# Patient Record
Sex: FEMALE | Race: WHITE | NOT HISPANIC OR LATINO | Employment: OTHER | URBAN - NONMETROPOLITAN AREA
[De-identification: names, ages, dates, MRNs, and addresses within clinical notes are randomized per-mention and may not be internally consistent; named-entity substitution may affect disease eponyms.]

---

## 2018-04-01 ENCOUNTER — HISTORICAL (OUTPATIENT)
Dept: ADMINISTRATIVE | Facility: HOSPITAL | Age: 58
End: 2018-04-01

## 2018-04-02 ENCOUNTER — HISTORICAL (OUTPATIENT)
Dept: ADMINISTRATIVE | Facility: HOSPITAL | Age: 58
End: 2018-04-02

## 2018-04-03 ENCOUNTER — HISTORICAL (OUTPATIENT)
Dept: ADMINISTRATIVE | Facility: HOSPITAL | Age: 58
End: 2018-04-03

## 2018-06-26 ENCOUNTER — HISTORICAL (OUTPATIENT)
Dept: ADMINISTRATIVE | Facility: HOSPITAL | Age: 58
End: 2018-06-26

## 2018-08-01 ENCOUNTER — HISTORICAL (OUTPATIENT)
Dept: ADMINISTRATIVE | Facility: HOSPITAL | Age: 58
End: 2018-08-01

## 2018-12-26 ENCOUNTER — HISTORICAL (OUTPATIENT)
Dept: ADMINISTRATIVE | Facility: HOSPITAL | Age: 58
End: 2018-12-26

## 2019-08-14 ENCOUNTER — HISTORICAL (OUTPATIENT)
Dept: ADMINISTRATIVE | Facility: HOSPITAL | Age: 59
End: 2019-08-14

## 2019-09-04 ENCOUNTER — HISTORICAL (OUTPATIENT)
Dept: ADMINISTRATIVE | Facility: HOSPITAL | Age: 59
End: 2019-09-04

## 2021-02-11 LAB
CHOLEST SERPL-MCNC: 191 MG/DL (ref 0–200)
DEPRECATED CALCIDIOL+CALCIFEROL SERPL-MC: 26.3 NG/ML (ref 30–100)
HDLC SERPL-MCNC: 98 MG/DL (ref 40–60)
LDLC SERPL CALC-MCNC: 63.3 MG/DL (ref 30–100)
TRIGL SERPL-MCNC: 83 MG/DL (ref 30–200)
TSH SERPL-ACNC: 2.48 UIU/ML (ref 0.36–3.74)

## 2021-07-11 ENCOUNTER — HISTORICAL (OUTPATIENT)
Dept: ADMINISTRATIVE | Facility: HOSPITAL | Age: 61
End: 2021-07-11

## 2021-07-29 LAB
FLUAV AG UPPER RESP QL IA.RAPID: NORMAL
FLUBV AG UPPER RESP QL IA.RAPID: NORMAL

## 2021-08-19 LAB
ALBUMIN SERPL-MCNC: 3.7 G/DL (ref 3.4–5)
ALBUMIN/GLOB SERPL: 1.3 {RATIO}
ALP SERPL-CCNC: 110 U/L (ref 50–144)
ALT SERPL-CCNC: 16 U/L (ref 1–45)
ANION GAP SERPL CALC-SCNC: 4 MMOL/L (ref 7–16)
AST SERPL-CCNC: 31 U/L (ref 14–36)
BASOPHILS # BLD AUTO: 0.03 X10(3)/MCL (ref 0.01–0.08)
BASOPHILS NFR BLD AUTO: 0.7 % (ref 0.1–1.2)
BILIRUB SERPL-MCNC: 0.64 MG/DL (ref 0.1–1)
BUN SERPL-MCNC: 12 MG/DL (ref 7–20)
CALCIUM SERPL-MCNC: 9.7 MG/DL (ref 8.4–10.2)
CHLORIDE SERPL-SCNC: 110 MMOL/L (ref 94–110)
CHOLEST SERPL-MCNC: 200 MG/DL (ref 0–200)
CO2 SERPL-SCNC: 25 MMOL/L (ref 21–32)
CREAT SERPL-MCNC: 0.7 MG/DL (ref 0.52–1.04)
CREAT/UREA NIT SERPL: 17.1 (ref 12–20)
DEPRECATED CALCIDIOL+CALCIFEROL SERPL-MC: 28.8 NG/ML (ref 30–100)
EOSINOPHIL # BLD AUTO: 0.2 X10(3)/MCL (ref 0.04–0.36)
EOSINOPHIL NFR BLD AUTO: 4.7 % (ref 0.7–7)
ERYTHROCYTE [DISTWIDTH] IN BLOOD BY AUTOMATED COUNT: 14.3 % (ref 11–14.5)
EST. AVERAGE GLUCOSE BLD GHB EST-MCNC: 93 MG/DL (ref 70–115)
GLOBULIN SER-MCNC: 2.8 G/DL (ref 2–3.9)
GLUCOSE SERPL-MCNC: 96 MGM./DL (ref 70–115)
HBA1C MFR BLD: 5.1 % (ref 4–6)
HCT VFR BLD AUTO: 39.5 % (ref 36–48)
HDLC SERPL-MCNC: 98 MG/DL (ref 40–60)
HGB BLD-MCNC: 12.6 G/DL (ref 11.8–16)
IMM GRANULOCYTES # BLD AUTO: 0.03 X10E3/UL (ref 0–0.03)
IMM GRANULOCYTES NFR BLD AUTO: 0.7 % (ref 0–0.5)
LDLC SERPL CALC-MCNC: 73.8 MG/DL (ref 30–100)
LYMPHOCYTES # BLD AUTO: 1.62 X10(3)/MCL (ref 1.16–3.74)
LYMPHOCYTES NFR BLD AUTO: 38.4 % (ref 20–55)
MCH RBC QN AUTO: 29.3 PG (ref 27–34)
MCHC RBC AUTO-ENTMCNC: 31.9 G/DL (ref 31–37)
MCV RBC AUTO: 91.9 FL (ref 79–99)
MONOCYTES # BLD AUTO: 0.31 X10(3)/MCL (ref 0.24–0.36)
MONOCYTES NFR BLD AUTO: 7.3 % (ref 4.7–12.5)
NEUTROPHILS # BLD AUTO: 2.03 X10(3)/MCL (ref 1.56–6.13)
NEUTROPHILS NFR BLD AUTO: 48.2 % (ref 37–73)
PLATELET # BLD AUTO: 165 X10(3)/MCL (ref 140–371)
PMV BLD AUTO: 10.8 FL (ref 9.4–12.4)
POTASSIUM SERPL-SCNC: 3.9 MMOL/L (ref 3.5–5.1)
PROT SERPL-MCNC: 6.5 G/DL (ref 6.3–8.2)
RBC # BLD AUTO: 4.3 X10(6)/MCL (ref 4–5.1)
SODIUM SERPL-SCNC: 139 MMOL/L (ref 135–145)
TRIGL SERPL-MCNC: 97 MG/DL (ref 30–200)
TSH SERPL-ACNC: 2.84 UIU/ML (ref 0.36–3.74)
WBC # SPEC AUTO: 4.2 X10(3)/MCL (ref 4–11.5)

## 2021-09-05 ENCOUNTER — HISTORICAL (OUTPATIENT)
Dept: ADMINISTRATIVE | Facility: HOSPITAL | Age: 61
End: 2021-09-05

## 2021-09-05 LAB
ALBUMIN SERPL-MCNC: 4.1 G/DL (ref 3.4–5)
ALBUMIN/GLOB SERPL: 1.3 {RATIO}
ALP SERPL-CCNC: 118 U/L (ref 50–144)
ALT SERPL-CCNC: 21 U/L (ref 1–45)
ANION GAP SERPL CALC-SCNC: 8 MMOL/L (ref 7–16)
AST SERPL-CCNC: 34 U/L (ref 14–36)
BASOPHILS # BLD AUTO: 0.03 X10(3)/MCL (ref 0.01–0.08)
BASOPHILS NFR BLD AUTO: 0.4 % (ref 0.1–1.2)
BILIRUB SERPL-MCNC: 0.96 MG/DL (ref 0.1–1)
BUN SERPL-MCNC: 12 MG/DL (ref 7–20)
CALCIUM SERPL-MCNC: 9.8 MG/DL (ref 8.4–10.2)
CHLORIDE SERPL-SCNC: 106 MMOL/L (ref 94–110)
CO2 SERPL-SCNC: 21 MMOL/L (ref 21–32)
CREAT SERPL-MCNC: 1 MG/DL (ref 0.52–1.04)
CREAT/UREA NIT SERPL: 12 (ref 12–20)
EOSINOPHIL # BLD AUTO: 0.08 X10(3)/MCL (ref 0.04–0.36)
EOSINOPHIL NFR BLD AUTO: 1.1 % (ref 0.7–7)
ERYTHROCYTE [DISTWIDTH] IN BLOOD BY AUTOMATED COUNT: 13.9 % (ref 11–14.5)
GLOBULIN SER-MCNC: 3.1 G/DL (ref 2–3.9)
GLUCOSE SERPL-MCNC: 113 MGM./DL (ref 70–115)
HCT VFR BLD AUTO: 39.8 % (ref 36–48)
HGB BLD-MCNC: 13.2 G/DL (ref 11.8–16)
IMM GRANULOCYTES # BLD AUTO: 0.03 X10E3/UL (ref 0–0.03)
IMM GRANULOCYTES NFR BLD AUTO: 0.4 % (ref 0–0.5)
LYMPHOCYTES # BLD AUTO: 1.12 X10(3)/MCL (ref 1.16–3.74)
LYMPHOCYTES NFR BLD AUTO: 15.5 % (ref 20–55)
MCH RBC QN AUTO: 29.5 PG (ref 27–34)
MCHC RBC AUTO-ENTMCNC: 33.2 G/DL (ref 31–37)
MCV RBC AUTO: 89 FL (ref 79–99)
MONOCYTES # BLD AUTO: 0.73 X10(3)/MCL (ref 0.24–0.36)
MONOCYTES NFR BLD AUTO: 10.1 % (ref 4.7–12.5)
NEUTROPHILS # BLD AUTO: 5.23 X10(3)/MCL (ref 1.56–6.13)
NEUTROPHILS NFR BLD AUTO: 72.5 % (ref 37–73)
PLATELET # BLD AUTO: 111 X10(3)/MCL (ref 140–371)
PMV BLD AUTO: 10.2 FL (ref 9.4–12.4)
POTASSIUM SERPL-SCNC: 3.1 MMOL/L (ref 3.5–5.1)
PROT SERPL-MCNC: 7.2 G/DL (ref 6.3–8.2)
RBC # BLD AUTO: 4.47 X10(6)/MCL (ref 4–5.1)
SODIUM SERPL-SCNC: 135 MMOL/L (ref 135–145)
WBC # SPEC AUTO: 7.2 X10(3)/MCL (ref 4–11.5)

## 2022-03-29 ENCOUNTER — HISTORICAL (OUTPATIENT)
Dept: ADMINISTRATIVE | Facility: HOSPITAL | Age: 62
End: 2022-03-29

## 2022-04-10 ENCOUNTER — HISTORICAL (OUTPATIENT)
Dept: ADMINISTRATIVE | Facility: HOSPITAL | Age: 62
End: 2022-04-10

## 2022-04-26 VITALS
WEIGHT: 203.06 LBS | SYSTOLIC BLOOD PRESSURE: 138 MMHG | HEIGHT: 61 IN | BODY MASS INDEX: 38.34 KG/M2 | OXYGEN SATURATION: 97 % | DIASTOLIC BLOOD PRESSURE: 70 MMHG

## 2022-05-02 ENCOUNTER — HISTORICAL (OUTPATIENT)
Dept: ADMINISTRATIVE | Facility: HOSPITAL | Age: 62
End: 2022-05-02

## 2022-11-29 ENCOUNTER — HISTORICAL (OUTPATIENT)
Dept: ADMINISTRATIVE | Facility: HOSPITAL | Age: 62
End: 2022-11-29

## 2023-01-12 ENCOUNTER — OFFICE VISIT (OUTPATIENT)
Dept: HEMATOLOGY/ONCOLOGY | Facility: CLINIC | Age: 63
End: 2023-01-12
Payer: MEDICARE

## 2023-01-12 VITALS
OXYGEN SATURATION: 97 % | TEMPERATURE: 98 F | RESPIRATION RATE: 16 BRPM | BODY MASS INDEX: 41.54 KG/M2 | HEART RATE: 56 BPM | DIASTOLIC BLOOD PRESSURE: 74 MMHG | SYSTOLIC BLOOD PRESSURE: 134 MMHG | HEIGHT: 61 IN | WEIGHT: 220 LBS

## 2023-01-12 DIAGNOSIS — D70.9 NEUTROPENIA, UNSPECIFIED TYPE: Primary | ICD-10-CM

## 2023-01-12 DIAGNOSIS — D70.8 OTHER NEUTROPENIA: ICD-10-CM

## 2023-01-12 DIAGNOSIS — F31.9 BIPOLAR AFFECTIVE DISORDER, REMISSION STATUS UNSPECIFIED: ICD-10-CM

## 2023-01-12 DIAGNOSIS — E66.01 MORBID OBESITY: ICD-10-CM

## 2023-01-12 PROCEDURE — 3075F SYST BP GE 130 - 139MM HG: CPT | Mod: CPTII,S$GLB,, | Performed by: INTERNAL MEDICINE

## 2023-01-12 PROCEDURE — 3075F PR MOST RECENT SYSTOLIC BLOOD PRESS GE 130-139MM HG: ICD-10-PCS | Mod: CPTII,S$GLB,, | Performed by: INTERNAL MEDICINE

## 2023-01-12 PROCEDURE — 3008F BODY MASS INDEX DOCD: CPT | Mod: CPTII,S$GLB,, | Performed by: INTERNAL MEDICINE

## 2023-01-12 PROCEDURE — 99205 OFFICE O/P NEW HI 60 MIN: CPT | Mod: S$GLB,,, | Performed by: INTERNAL MEDICINE

## 2023-01-12 PROCEDURE — 1159F PR MEDICATION LIST DOCUMENTED IN MEDICAL RECORD: ICD-10-PCS | Mod: CPTII,S$GLB,, | Performed by: INTERNAL MEDICINE

## 2023-01-12 PROCEDURE — 99205 PR OFFICE/OUTPT VISIT, NEW, LEVL V, 60-74 MIN: ICD-10-PCS | Mod: S$GLB,,, | Performed by: INTERNAL MEDICINE

## 2023-01-12 PROCEDURE — 3078F DIAST BP <80 MM HG: CPT | Mod: CPTII,S$GLB,, | Performed by: INTERNAL MEDICINE

## 2023-01-12 PROCEDURE — 3078F PR MOST RECENT DIASTOLIC BLOOD PRESSURE < 80 MM HG: ICD-10-PCS | Mod: CPTII,S$GLB,, | Performed by: INTERNAL MEDICINE

## 2023-01-12 PROCEDURE — 1159F MED LIST DOCD IN RCRD: CPT | Mod: CPTII,S$GLB,, | Performed by: INTERNAL MEDICINE

## 2023-01-12 PROCEDURE — 3008F PR BODY MASS INDEX (BMI) DOCUMENTED: ICD-10-PCS | Mod: CPTII,S$GLB,, | Performed by: INTERNAL MEDICINE

## 2023-01-12 RX ORDER — ROPINIROLE 0.5 MG/1
0.5 TABLET, FILM COATED ORAL NIGHTLY
COMMUNITY
Start: 2023-01-05 | End: 2023-02-07 | Stop reason: SDUPTHER

## 2023-01-12 RX ORDER — RIZATRIPTAN BENZOATE 10 MG/1
TABLET ORAL
COMMUNITY
Start: 2018-01-07 | End: 2023-08-08

## 2023-01-12 RX ORDER — PAROXETINE 30 MG/1
30 TABLET, FILM COATED ORAL
COMMUNITY
Start: 2023-01-09 | End: 2023-02-07 | Stop reason: SDUPTHER

## 2023-01-12 RX ORDER — ATORVASTATIN CALCIUM 40 MG/1
40 TABLET, FILM COATED ORAL
COMMUNITY
Start: 2022-10-05 | End: 2023-11-09 | Stop reason: SDUPTHER

## 2023-01-12 RX ORDER — NAPROXEN 500 MG/1
TABLET ORAL
COMMUNITY
Start: 2022-08-30

## 2023-01-12 RX ORDER — FLUTICASONE PROPIONATE 50 MCG
SPRAY, SUSPENSION (ML) NASAL
COMMUNITY
Start: 2013-01-07 | End: 2023-09-15 | Stop reason: SDUPTHER

## 2023-01-12 RX ORDER — CARBAMAZEPINE 200 MG/1
TABLET ORAL
COMMUNITY
Start: 2006-08-07 | End: 2023-03-24

## 2023-01-12 RX ORDER — TRAZODONE HYDROCHLORIDE 150 MG/1
TABLET ORAL
COMMUNITY
Start: 2022-12-27 | End: 2023-01-25 | Stop reason: SDUPTHER

## 2023-01-12 RX ORDER — ALBUTEROL SULFATE 90 UG/1
AEROSOL, METERED RESPIRATORY (INHALATION)
COMMUNITY
Start: 2022-08-30 | End: 2023-05-23 | Stop reason: SDUPTHER

## 2023-01-12 RX ORDER — TOPIRAMATE 25 MG/1
25 TABLET ORAL
COMMUNITY
Start: 2022-07-18 | End: 2023-05-23 | Stop reason: SDUPTHER

## 2023-01-12 RX ORDER — ERGOCALCIFEROL 1.25 MG/1
CAPSULE ORAL
COMMUNITY
Start: 2022-06-07 | End: 2023-05-23 | Stop reason: SDUPTHER

## 2023-01-12 NOTE — PROGRESS NOTES
HEMATOLOGY INITIAL CONSULTATION NOTE    Patient ID: Sophy Iyer is a 62 y.o. female.    Chief Complaint:  Leukopenia    HPI:  Patient is 62-year-old female with past medical history of migraines, hyperlipidemia, bipolar disorder, anxiety, restless leg syndrome, chronic sinusitis, gastroesophageal reflux disease, obesity, irritable bowel syndrome who is referred by her PCP for evaluation of leukopenia which per PCP is trending down over the last 1 year.  She has been on carbamazepine with a trial to wean her off.  She denies any weight loss, night sweats and presents to our clinic today for further evaluation.                  Past Medical History:   Diagnosis Date    Anemia     Depression     Mixed hyperlipidemia        No family history on file.    Social History     Socioeconomic History    Marital status: Single   Tobacco Use    Smoking status: Never    Smokeless tobacco: Never   Substance and Sexual Activity    Alcohol use: Never    Drug use: Never         Past Surgical History:   Procedure Laterality Date    CARPAL TUNNEL RELEASE      CHOLECYSTECTOMY      COLON BIOPSY      HYSTERECTOMY               Review of systems:  Review of Systems   Constitutional:  Positive for activity change and fatigue. Negative for appetite change, chills, diaphoresis and unexpected weight change.   HENT:  Negative for congestion, facial swelling, hearing loss, mouth sores, trouble swallowing and voice change.    Eyes:  Negative for photophobia, pain, discharge and itching.   Respiratory:  Negative for apnea, cough, choking, chest tightness and shortness of breath.    Cardiovascular:  Negative for chest pain, palpitations and leg swelling.   Gastrointestinal:  Negative for abdominal distention, abdominal pain, anal bleeding and blood in stool.   Endocrine: Negative for cold intolerance, heat intolerance, polydipsia and polyphagia.   Genitourinary:  Negative for difficulty urinating, dysuria, flank pain and hematuria.    Musculoskeletal:  Negative for arthralgias, back pain, joint swelling, myalgias, neck pain and neck stiffness.   Skin:  Negative for color change, pallor and wound.   Allergic/Immunologic: Negative for environmental allergies, food allergies and immunocompromised state.   Neurological:  Negative for dizziness, seizures, facial asymmetry, speech difficulty, light-headedness, numbness and headaches.   Hematological:  Negative for adenopathy. Does not bruise/bleed easily.   Psychiatric/Behavioral:  Negative for agitation, behavioral problems, confusion, decreased concentration and sleep disturbance.                                      Physical Exam  Vitals and nursing note reviewed.   Constitutional:       General: She is not in acute distress.     Appearance: Normal appearance. She is not ill-appearing.   HENT:      Head: Normocephalic and atraumatic.      Nose: No congestion or rhinorrhea.   Eyes:      General: No scleral icterus.     Extraocular Movements: Extraocular movements intact.      Pupils: Pupils are equal, round, and reactive to light.   Cardiovascular:      Rate and Rhythm: Normal rate and regular rhythm.      Pulses: Normal pulses.      Heart sounds: Normal heart sounds. No murmur heard.    No gallop.   Pulmonary:      Effort: Pulmonary effort is normal. No respiratory distress.      Breath sounds: Normal breath sounds. No stridor. No wheezing or rhonchi.   Abdominal:      General: Bowel sounds are normal. There is no distension.      Palpations: There is no mass.      Tenderness: There is no abdominal tenderness. There is no guarding.   Musculoskeletal:         General: No swelling, tenderness, deformity or signs of injury. Normal range of motion.      Cervical back: Normal range of motion and neck supple. No rigidity. No muscular tenderness.      Right lower leg: No edema.      Left lower leg: No edema.   Skin:     General: Skin is warm.      Coloration: Skin is not jaundiced or pale.      Findings:  No bruising or lesion.   Neurological:      General: No focal deficit present.      Mental Status: She is alert and oriented to person, place, and time.      Cranial Nerves: No cranial nerve deficit.      Sensory: No sensory deficit.      Motor: No weakness.      Gait: Gait normal.   Psychiatric:         Mood and Affect: Mood normal.         Behavior: Behavior normal.         Thought Content: Thought content normal.     Vitals:    01/12/23 1030   BP: 134/74   Pulse: (!) 56   Resp: 16   Temp: 97.8 °F (36.6 °C)   Body surface area is 2.07 meters squared.    Assessment/Plan:      Leukopenia:    == I do not have previous labs over the last 1 year for comparison but based on primary care provider notes her leukopenia has been worsening over the last 1 year.  == Recent labs done few months back reviewed, where she was noted to have mild leukopenia.  Normal differential.  Mildly decreased platelet count.  Normal hemoglobin hematocrit.  She was also noted to have mildly increased TSH which could be causing mild leukopenia.  == Peripheral blood smear revealed leukopenia with an absolute neutropenia along with mild thrombocytopenia but no other significant abnormality.  I discussed with her several etiologies of leukopenia including both primary versus secondary bone marrow etiologies.  == I believe in her case carbamazepine could be likely contributing, also she has mildly increased TSH levels.  Nonetheless for the sake of completion I will obtain flow cytometry and if any abnormalities noted on this will obtain a bone marrow biopsy.  If no abnormalities noted on flow then I would continue with observation only.    Plan:  Flow cytometry  TSH    Return to clinic in 5 weeks for MD visit with CBC prior    Pt is instructed to RTC with labs for continued monitoring of treatment as instructed.     Total time spent in counseling and discussion about further management options including relevant lab work, treatment,  prognosis,  medications and intended side effects was more than 60 minutes. More than 50 % of the time was spent in counseling and coordination of care.  This includes face to face time and non-face to face time preparing to see the patient (eg, review of tests), Obtaining and/or reviewing separately obtained history, Documenting clinical information in the electronic or other health record, Independently interpreting resultsand communicating results to the patient/family/caregiver, or Care coordination.

## 2023-01-13 PROBLEM — F31.9 BIPOLAR AFFECTIVE DISORDER, REMISSION STATUS UNSPECIFIED: Status: ACTIVE | Noted: 2023-01-13

## 2023-01-13 PROBLEM — D70.8 OTHER NEUTROPENIA: Status: ACTIVE | Noted: 2023-01-13

## 2023-01-13 PROBLEM — E66.01 MORBID OBESITY: Status: ACTIVE | Noted: 2023-01-13

## 2023-01-13 NOTE — PROGRESS NOTES
Patient, Sophy Iyer (MRN #[id number]), presented with a recent Platelet count less than 150 K/uL consistent with the definition of thrombocytopenia (ICD10 - D69.6).    Platelet   Date Value Ref Range Status   09/05/2021 111 (L) 140 - 371 x10(3)/mcL Final     The patient's thrombocytopenia was monitored, evaluated, addressed and/or treated. This addendum to the medical record is made on 01/13/2023.

## 2023-01-16 LAB — SPECIMEN TO PATHOLOGY: NORMAL

## 2023-01-25 DIAGNOSIS — G47.00 INSOMNIA, UNSPECIFIED TYPE: Primary | ICD-10-CM

## 2023-01-25 RX ORDER — TRAZODONE HYDROCHLORIDE 150 MG/1
TABLET ORAL
Qty: 30 TABLET | Refills: 1 | Status: SHIPPED | OUTPATIENT
Start: 2023-01-25 | End: 2023-02-16 | Stop reason: SDUPTHER

## 2023-02-07 DIAGNOSIS — G25.81 RESTLESS LEG: Primary | ICD-10-CM

## 2023-02-07 DIAGNOSIS — F32.9 MAJOR DEPRESSIVE DISORDER WITH CURRENT ACTIVE EPISODE, UNSPECIFIED DEPRESSION EPISODE SEVERITY, UNSPECIFIED WHETHER RECURRENT: ICD-10-CM

## 2023-02-07 DIAGNOSIS — D70.9 NEUTROPENIA, UNSPECIFIED TYPE: Primary | ICD-10-CM

## 2023-02-07 RX ORDER — ROPINIROLE 0.5 MG/1
0.5 TABLET, FILM COATED ORAL NIGHTLY
Qty: 30 TABLET | Refills: 0 | Status: SHIPPED | OUTPATIENT
Start: 2023-02-07 | End: 2023-02-16 | Stop reason: SDUPTHER

## 2023-02-07 RX ORDER — PAROXETINE 30 MG/1
30 TABLET, FILM COATED ORAL DAILY
Qty: 30 TABLET | Refills: 0 | Status: SHIPPED | OUTPATIENT
Start: 2023-02-07 | End: 2023-02-16 | Stop reason: SDUPTHER

## 2023-02-07 NOTE — TELEPHONE ENCOUNTER
Pt telephoned office to check on refill. Pt in need of refill by 4pm today for delivery.     Appt was rescheduled for next week when transportation is available.     Pt out of Paxil today.

## 2023-02-07 NOTE — TELEPHONE ENCOUNTER
----- Message from Renetta Justin sent at 2/6/2023 10:44 AM CST -----    Paxil 30 mg oral tablet    ropinirole 0.5 mg oral tablet    Completely out of both     [facility]

## 2023-02-09 ENCOUNTER — OFFICE VISIT (OUTPATIENT)
Dept: HEMATOLOGY/ONCOLOGY | Facility: CLINIC | Age: 63
End: 2023-02-09
Payer: MEDICARE

## 2023-02-09 VITALS
TEMPERATURE: 98 F | DIASTOLIC BLOOD PRESSURE: 72 MMHG | BODY MASS INDEX: 40.02 KG/M2 | HEART RATE: 63 BPM | HEIGHT: 61 IN | WEIGHT: 212 LBS | SYSTOLIC BLOOD PRESSURE: 119 MMHG | OXYGEN SATURATION: 97 % | RESPIRATION RATE: 16 BRPM

## 2023-02-09 DIAGNOSIS — D70.9 NEUTROPENIA, UNSPECIFIED TYPE: Primary | ICD-10-CM

## 2023-02-09 LAB
CELLS COUNTED: 100
EOSINOPHIL NFR BLD: 6 % (ref 1–3)
ERYTHROCYTE [DISTWIDTH] IN BLOOD BY AUTOMATED COUNT: 13.1 % (ref 12.5–18)
HCT VFR BLD AUTO: 39.8 % (ref 37–47)
HGB BLD-MCNC: 13.2 G/DL (ref 12–16)
LYMPHOCYTES NFR BLD: 24 % (ref 25–40)
MCH RBC QN AUTO: 31.7 PG (ref 27–31.2)
MCHC RBC AUTO-ENTMCNC: 33.2 G/DL (ref 31.8–35.4)
MCV RBC AUTO: 95.4 FL (ref 80–97)
MONOCYTES NFR BLD: 9 % (ref 1–15)
NEUTROPHILS # BLD AUTO: 1.8 10*3/UL (ref 1.8–7.7)
NEUTROPHILS NFR BLD: 61 % (ref 37–80)
NUCLEATED RED BLOOD CELLS: 0 %
PLATELETS: 101 10*3/UL (ref 142–424)
RBC # BLD AUTO: 4.17 10*6/UL (ref 4.2–5.4)
SMALL PLATELETS BLD QL SMEAR: ABNORMAL
WBC # BLD: 2.9 10*3/UL (ref 4.6–10.2)

## 2023-02-09 PROCEDURE — 3078F DIAST BP <80 MM HG: CPT | Mod: CPTII,S$GLB,, | Performed by: INTERNAL MEDICINE

## 2023-02-09 PROCEDURE — 3074F PR MOST RECENT SYSTOLIC BLOOD PRESSURE < 130 MM HG: ICD-10-PCS | Mod: CPTII,S$GLB,, | Performed by: INTERNAL MEDICINE

## 2023-02-09 PROCEDURE — 1159F MED LIST DOCD IN RCRD: CPT | Mod: CPTII,S$GLB,, | Performed by: INTERNAL MEDICINE

## 2023-02-09 PROCEDURE — 3074F SYST BP LT 130 MM HG: CPT | Mod: CPTII,S$GLB,, | Performed by: INTERNAL MEDICINE

## 2023-02-09 PROCEDURE — 3008F BODY MASS INDEX DOCD: CPT | Mod: CPTII,S$GLB,, | Performed by: INTERNAL MEDICINE

## 2023-02-09 PROCEDURE — 3078F PR MOST RECENT DIASTOLIC BLOOD PRESSURE < 80 MM HG: ICD-10-PCS | Mod: CPTII,S$GLB,, | Performed by: INTERNAL MEDICINE

## 2023-02-09 PROCEDURE — 99214 PR OFFICE/OUTPT VISIT, EST, LEVL IV, 30-39 MIN: ICD-10-PCS | Mod: S$GLB,,, | Performed by: INTERNAL MEDICINE

## 2023-02-09 PROCEDURE — 1159F PR MEDICATION LIST DOCUMENTED IN MEDICAL RECORD: ICD-10-PCS | Mod: CPTII,S$GLB,, | Performed by: INTERNAL MEDICINE

## 2023-02-09 PROCEDURE — 3008F PR BODY MASS INDEX (BMI) DOCUMENTED: ICD-10-PCS | Mod: CPTII,S$GLB,, | Performed by: INTERNAL MEDICINE

## 2023-02-09 PROCEDURE — 99214 OFFICE O/P EST MOD 30 MIN: CPT | Mod: S$GLB,,, | Performed by: INTERNAL MEDICINE

## 2023-02-09 NOTE — PROGRESS NOTES
HEMATOLOGY FOLLOW UP CONSULTATION NOTE    Patient ID: Sophy Iyer is a 62 y.o. female.    Chief Complaint:  Leukopenia    HPI:  Patient is 62-year-old female with past medical history of migraines, hyperlipidemia, bipolar disorder, anxiety, restless leg syndrome, chronic sinusitis, gastroesophageal reflux disease, obesity, irritable bowel syndrome who is referred by her PCP for evaluation of leukopenia which per PCP is trending down over the last 1 year.  She has been on carbamazepine with a trial to wean her off.  She denies any weight loss, night sweats and presents to our clinic today for further evaluation.      1. PERIPHERAL BLOOD, FLOW CYTOMETRY:      - NO IMMUNOPHENOTYPICALLY ABNORMAL CELL POPULATIONS IDENTIFIED.   Comment:   There is no evidence of B-cell light chain restriction nor of an   abnormal T-cell immunophenotype.  No discrete blast population is   identified. Correlation with clinical information, CBC indices, and other   laboratory data is recommended.     2.  PERIPHERAL SMEAR, PERIPHERAL SMEAR REVIEW:      - LEUKOPENIA WITH ABSOLUTE NEUTROPENIA (MINIMAL).      - RED BLOOD CELLS DEMONSTRATE NORMOCHROMIC, NORMOCYTIC ANEMIA.      - THROMBOCYTOPENIA, SEE COMMENT.   Comment:  There is a pancytopenia with very minimal neutropenia, minimal   normochromic, normocytic anemia and thrombocytopenia.  Platelets are   decreased in number with normal morphology.  No clumping or satellitosis   seen.  No circulating blasts or atypical lymphocytes are seen.  No   significant anisopoikilocytosis noted; however, rare teardrop and   elliptocytes seen.  Correlate clinically and with flow cytometry study.            Past Medical History:   Diagnosis Date    Anemia     Depression     Mixed hyperlipidemia        No family history on file.    Social History     Socioeconomic History    Marital status: Single   Tobacco Use    Smoking status: Never    Smokeless tobacco: Never   Substance and Sexual Activity    Alcohol  use: Never    Drug use: Never         Past Surgical History:   Procedure Laterality Date    CARPAL TUNNEL RELEASE      CHOLECYSTECTOMY      COLON BIOPSY      HYSTERECTOMY               Review of systems:  Review of Systems   Constitutional:  Positive for activity change and fatigue. Negative for appetite change, chills, diaphoresis and unexpected weight change.   HENT:  Negative for congestion, facial swelling, hearing loss, mouth sores, trouble swallowing and voice change.    Eyes:  Negative for photophobia, pain, discharge and itching.   Respiratory:  Negative for apnea, cough, choking, chest tightness and shortness of breath.    Cardiovascular:  Negative for chest pain, palpitations and leg swelling.   Gastrointestinal:  Negative for abdominal distention, abdominal pain, anal bleeding and blood in stool.   Endocrine: Negative for cold intolerance, heat intolerance, polydipsia and polyphagia.   Genitourinary:  Negative for difficulty urinating, dysuria, flank pain and hematuria.   Musculoskeletal:  Negative for arthralgias, back pain, joint swelling, myalgias, neck pain and neck stiffness.   Skin:  Negative for color change, pallor and wound.   Allergic/Immunologic: Negative for environmental allergies, food allergies and immunocompromised state.   Neurological:  Negative for dizziness, seizures, facial asymmetry, speech difficulty, light-headedness, numbness and headaches.   Hematological:  Negative for adenopathy. Does not bruise/bleed easily.   Psychiatric/Behavioral:  Negative for agitation, behavioral problems, confusion, decreased concentration and sleep disturbance.                                      Physical Exam  Vitals and nursing note reviewed.   Constitutional:       General: She is not in acute distress.     Appearance: Normal appearance. She is not ill-appearing.   HENT:      Head: Normocephalic and atraumatic.      Nose: No congestion or rhinorrhea.   Eyes:      General: No scleral icterus.      Extraocular Movements: Extraocular movements intact.      Pupils: Pupils are equal, round, and reactive to light.   Cardiovascular:      Rate and Rhythm: Normal rate and regular rhythm.      Pulses: Normal pulses.      Heart sounds: Normal heart sounds. No murmur heard.    No gallop.   Pulmonary:      Effort: Pulmonary effort is normal. No respiratory distress.      Breath sounds: Normal breath sounds. No stridor. No wheezing or rhonchi.   Abdominal:      General: Bowel sounds are normal. There is no distension.      Palpations: There is no mass.      Tenderness: There is no abdominal tenderness. There is no guarding.   Musculoskeletal:         General: No swelling, tenderness, deformity or signs of injury. Normal range of motion.      Cervical back: Normal range of motion and neck supple. No rigidity. No muscular tenderness.      Right lower leg: No edema.      Left lower leg: No edema.   Skin:     General: Skin is warm.      Coloration: Skin is not jaundiced or pale.      Findings: No bruising or lesion.   Neurological:      General: No focal deficit present.      Mental Status: She is alert and oriented to person, place, and time.      Cranial Nerves: No cranial nerve deficit.      Sensory: No sensory deficit.      Motor: No weakness.      Gait: Gait normal.   Psychiatric:         Mood and Affect: Mood normal.         Behavior: Behavior normal.         Thought Content: Thought content normal.     Vitals:    02/09/23 1326   BP: 119/72   Pulse: 63   Resp: 16   Temp: 97.6 °F (36.4 °C)     Body surface area is 2.03 meters squared.    Assessment/Plan:      Leukopenia:    == I do not have previous labs over the last 1 year for comparison but based on primary care provider notes her leukopenia has been worsening over the last 1 year.  == Recent labs done few months back reviewed, where she was noted to have mild leukopenia.  Normal differential.  Mildly decreased platelet count.  Normal hemoglobin hematocrit.  She was  also noted to have mildly increased TSH which could be causing mild leukopenia.  == Peripheral blood smear revealed leukopenia with an absolute neutropenia along with mild thrombocytopenia but no other significant abnormality.  I discussed with her several etiologies of leukopenia including both primary versus secondary bone marrow etiologies. I believe in her case carbamazepine could be likely contributing, also she has mildly increased TSH levels.  Nonetheless for the sake of completion I will obtain flow cytometry and if any abnormalities noted on this will obtain a bone marrow biopsy.  If no abnormalities noted on flow then I would continue with observation only.  == 2/9/23:  Peripheral blood flow cytometry revealed no immunophenotypically abnormal cell population.  Will hence continue with observation only at this time. Cause of leukopenia likely reactive    Plan:  Return to clinic in 4 months  for MD visit with CBC prior    Pt is instructed to RTC with labs for continued monitoring of treatment as instructed.     Total time spent in counseling and discussion about further management options including relevant lab work, treatment,  prognosis, medications and intended side effects was more than 25 minutes. More than 50 % of the time was spent in counseling and coordination of care.  This includes face to face time and non-face to face time preparing to see the patient (eg, review of tests), Obtaining and/or reviewing separately obtained history, Documenting clinical information in the electronic or other health record, Independently interpreting resultsand communicating results to the patient/family/caregiver, or Care coordination.

## 2023-02-16 ENCOUNTER — OFFICE VISIT (OUTPATIENT)
Dept: BEHAVIORAL HEALTH | Facility: CLINIC | Age: 63
End: 2023-02-16
Payer: MEDICARE

## 2023-02-16 VITALS
HEIGHT: 61 IN | BODY MASS INDEX: 39.65 KG/M2 | WEIGHT: 210 LBS | HEART RATE: 66 BPM | TEMPERATURE: 99 F | DIASTOLIC BLOOD PRESSURE: 74 MMHG | SYSTOLIC BLOOD PRESSURE: 120 MMHG | OXYGEN SATURATION: 98 %

## 2023-02-16 DIAGNOSIS — F31.9 BIPOLAR AFFECTIVE DISORDER, REMISSION STATUS UNSPECIFIED: Primary | ICD-10-CM

## 2023-02-16 DIAGNOSIS — F43.21 GRIEF: ICD-10-CM

## 2023-02-16 DIAGNOSIS — G25.81 RESTLESS LEG: ICD-10-CM

## 2023-02-16 DIAGNOSIS — G25.81 RESTLESS LEGS SYNDROME (RLS): ICD-10-CM

## 2023-02-16 DIAGNOSIS — F32.9 MAJOR DEPRESSIVE DISORDER WITH CURRENT ACTIVE EPISODE, UNSPECIFIED DEPRESSION EPISODE SEVERITY, UNSPECIFIED WHETHER RECURRENT: ICD-10-CM

## 2023-02-16 DIAGNOSIS — G47.00 INSOMNIA, UNSPECIFIED TYPE: ICD-10-CM

## 2023-02-16 DIAGNOSIS — F41.1 GENERALIZED ANXIETY DISORDER: ICD-10-CM

## 2023-02-16 PROCEDURE — 99214 PR OFFICE/OUTPT VISIT, EST, LEVL IV, 30-39 MIN: ICD-10-PCS | Mod: ,,, | Performed by: NURSE PRACTITIONER

## 2023-02-16 PROCEDURE — 1159F MED LIST DOCD IN RCRD: CPT | Mod: ,,, | Performed by: NURSE PRACTITIONER

## 2023-02-16 PROCEDURE — 3078F DIAST BP <80 MM HG: CPT | Mod: ,,, | Performed by: NURSE PRACTITIONER

## 2023-02-16 PROCEDURE — 3074F SYST BP LT 130 MM HG: CPT | Mod: ,,, | Performed by: NURSE PRACTITIONER

## 2023-02-16 PROCEDURE — 3074F PR MOST RECENT SYSTOLIC BLOOD PRESSURE < 130 MM HG: ICD-10-PCS | Mod: ,,, | Performed by: NURSE PRACTITIONER

## 2023-02-16 PROCEDURE — 3008F BODY MASS INDEX DOCD: CPT | Mod: ,,, | Performed by: NURSE PRACTITIONER

## 2023-02-16 PROCEDURE — 3078F PR MOST RECENT DIASTOLIC BLOOD PRESSURE < 80 MM HG: ICD-10-PCS | Mod: ,,, | Performed by: NURSE PRACTITIONER

## 2023-02-16 PROCEDURE — 1159F PR MEDICATION LIST DOCUMENTED IN MEDICAL RECORD: ICD-10-PCS | Mod: ,,, | Performed by: NURSE PRACTITIONER

## 2023-02-16 PROCEDURE — 3008F PR BODY MASS INDEX (BMI) DOCUMENTED: ICD-10-PCS | Mod: ,,, | Performed by: NURSE PRACTITIONER

## 2023-02-16 PROCEDURE — 1160F PR REVIEW ALL MEDS BY PRESCRIBER/CLIN PHARMACIST DOCUMENTED: ICD-10-PCS | Mod: ,,, | Performed by: NURSE PRACTITIONER

## 2023-02-16 PROCEDURE — 99214 OFFICE O/P EST MOD 30 MIN: CPT | Mod: ,,, | Performed by: NURSE PRACTITIONER

## 2023-02-16 PROCEDURE — 1160F RVW MEDS BY RX/DR IN RCRD: CPT | Mod: ,,, | Performed by: NURSE PRACTITIONER

## 2023-02-16 RX ORDER — PAROXETINE 30 MG/1
30 TABLET, FILM COATED ORAL DAILY
Qty: 30 TABLET | Refills: 1 | Status: SHIPPED | OUTPATIENT
Start: 2023-02-16 | End: 2023-05-05

## 2023-02-16 RX ORDER — TRAZODONE HYDROCHLORIDE 150 MG/1
TABLET ORAL
Qty: 30 TABLET | Refills: 1 | Status: SHIPPED | OUTPATIENT
Start: 2023-02-16 | End: 2023-05-05 | Stop reason: SDUPTHER

## 2023-02-16 RX ORDER — ROPINIROLE 0.5 MG/1
0.5 TABLET, FILM COATED ORAL NIGHTLY
Qty: 30 TABLET | Refills: 1 | Status: SHIPPED | OUTPATIENT
Start: 2023-02-16 | End: 2023-05-23 | Stop reason: DRUGHIGH

## 2023-02-16 NOTE — PROGRESS NOTES
"  PSYCHIATRIC FOLLOW-UP VISIT NOTE    Chief Complaint   Patient presents with    Depression     "I have been feeling down r/t pain."         History of Present Illness  62 y.o. year old White female with hx of MDD, LILLY, insomnia, and RLS seen today for follow-up appointment and medication management.  She reports some mild depression due to chronic pain issues.  She also had a fall last week and is still experiencing some muscle soreness.  Denies any loss of consciousness from that fall.  She is also been dealing with some family discord as 1 of her family members is trying to cause trouble with the rest of us.  States she endorsed 2 per family or along the same page and handling this disruption the best they can.  Reports a decrease in anxiety and feels medications are effective for symptom control of her LILLY.  Denies any symptoms of restless legs syndrome recently.  No side effects from current medication regimen.  Sleeps well most nights and feels rested in the morning.  Also denies any mood lability or irritability. Patient denies SI/HI. Denies hallucinations and does not appear to be responding to internal stimuli or be internally preoccupied. No manic symptoms noted.       Objective:     Vitals:  Vitals:    02/16/23 1006   BP: 120/74   BP Location: Left arm   Patient Position: Sitting   Pulse: 66   Temp: 98.6 °F (37 °C)   TempSrc: Temporal   SpO2: 98%   Weight: 95.3 kg (210 lb)   Height: 5' 1" (1.549 m)       Wt Readings from Last 3 Encounters:   02/16/23 1006 95.3 kg (210 lb)   02/09/23 1326 96.2 kg (212 lb)   01/12/23 1030 99.8 kg (220 lb)         Medication:    Current Outpatient Medications:     albuterol (PROVENTIL/VENTOLIN HFA) 90 mcg/actuation inhaler, INHALE 2 PUFFS EVERY 4 HOURS FOR SHORTNESS OF BREATH OR WHEEZING -- SHAKE WELL BEFORE USE, Disp: , Rfl:     atorvastatin (LIPITOR) 40 MG tablet, Take 40 mg by mouth., Disp: , Rfl:     carBAMazepine (TEGRETOL) 200 mg tablet, , Disp: , Rfl:     " ergocalciferol (ERGOCALCIFEROL) 50,000 unit Cap, , Disp: , Rfl:     fluticasone propionate (FLONASE) 50 mcg/actuation nasal spray, , Disp: , Rfl:     naproxen (NAPROSYN) 500 MG tablet, TAKE 1 TABLET TWICE DAILY WITH FOOD FOR PAIN / INFLAMMATION, Disp: , Rfl:     pumpkin seed extract/soy germ (AZO BLADDER CONTROL ORAL), , Disp: , Rfl:     rizatriptan (MAXALT) 10 MG tablet, , Disp: , Rfl:     topiramate (TOPAMAX) 25 MG tablet, Take 25 mg by mouth., Disp: , Rfl:     paroxetine (PAXIL) 30 MG tablet, Take 1 tablet (30 mg total) by mouth once daily., Disp: 30 tablet, Rfl: 1    rOPINIRole (REQUIP) 0.5 MG tablet, Take 1 tablet (0.5 mg total) by mouth every evening., Disp: 30 tablet, Rfl: 1    traZODone (DESYREL) 150 MG tablet, TAKE 1 TABLET AT BEDTIME AS NEEDED FOR INSOMNIA, Disp: 30 tablet, Rfl: 1       Significant Labs: - none at this time    Significant Imaging: - none at this time    Physical Exam  Vitals and nursing note reviewed.   Constitutional:       General: She is awake.      Appearance: Normal appearance.   Musculoskeletal:      Comments: Full ROM   Neurological:      Mental Status: She is alert.   Psychiatric:         Attention and Perception: Attention and perception normal. She does not perceive auditory or visual hallucinations.         Mood and Affect: Affect normal.         Speech: Speech normal.         Behavior: Behavior is cooperative.         Thought Content: Thought content does not include homicidal or suicidal ideation.         Cognition and Memory: Cognition and memory normal.        Review of Systems     Mental Status Exam:  Presentation:  - Appearance: 62 y.o. year old White female, appears stated age, appears Casually dressed and Well groomed  - Motility: Erect when standing, Steady gait, No EPS or Tremors, No psychomotor agitation or retardation appreciated  - Behavior: calm, cooperative, good eye contact  Speech:  - Character/Organization: spontaneous, fluent, normal volume, normal rate,  "normal rhythm  Emotional State:  - Mood: "pleasant"   - Affect: congruent and appropriate  Thought:  - Process: logical, linear, organized , goal-directed  - Preoccupations: no ruminations, rituals, or phobias appreciated  - Delusions: no persecutory, paranoid, or grandiose delusions appreciated  - Perception: denies AVH, not actively responding to internal stimuli  - SI/HI: denies/denies  Sensorium & Intellect:  - Sensorium: AAOx4  - Memory: intact to recent and remote events  - Attention/Concentration: good/good  - Insight/Judgement: good/good    Gait: normal swing and stance  MSK:no rigidity appreciated    All other systems without acute issues unless noted in HPI      Assessment/Plan      ICD-10-CM ICD-9-CM    1. Bipolar affective disorder, remission status unspecified  F31.9 296.80       2. Generalized anxiety disorder  F41.1 300.02       3. Grief  F43.21 309.0       4. Restless legs syndrome (RLS)  G25.81 333.94       5. Insomnia, unspecified type  G47.00 780.52 traZODone (DESYREL) 150 MG tablet      6. Restless leg  G25.81 333.94 rOPINIRole (REQUIP) 0.5 MG tablet      7. Major depressive disorder with current active episode, unspecified depression episode severity, unspecified whether recurrent  F32.9 296.30 paroxetine (PAXIL) 30 MG tablet         Continue current medications without change    Reviewed non-pharmacologic coping skills to decrease anxiety, such as deep breathing, journaling, exercise, recognizing triggers, meditation, healthy diet and exercise, routine sleep schedule, negative thought recognition, time for hobbies/interests, relaxation techniques, avoidance of substance abuse, limiting caffeine, benefits of counseling, and biofeedback. Patient verbalized understanding.    Potential side effects and risks vs benefits of current treatment plan reviewed with patient. Applicable black box warnings reviewed. Encouraged patient not to alter dosages or abruptly discontinue medications without " contacting prescriber first, due to risk of worsening symptoms and decompensation of mental status. Warned of risks associated with herbal remedies and supplements while taking psychotropic medications and of the need to consult prescriber prior to adding any of these to current regimen. Patient should abstain from abuse of alcohol, prescription medications, and illicit drugs. Reviewed when to contact clinic and/or seek emergent care, such as but not limited to, onset/worsening SI/HI, hallucinations, delusions, manic symptoms. Pt verbalized understanding and agreement of these warnings/recommendations and verbally consented to treatment plan.      Follow up in about 2 months (around 4/16/2023) for Medication Management.    Matthew Ferreira, MAYTEP

## 2023-02-22 ENCOUNTER — OFFICE VISIT (OUTPATIENT)
Dept: FAMILY MEDICINE | Facility: CLINIC | Age: 63
End: 2023-02-22
Payer: MEDICARE

## 2023-02-22 VITALS
HEART RATE: 60 BPM | SYSTOLIC BLOOD PRESSURE: 120 MMHG | TEMPERATURE: 98 F | OXYGEN SATURATION: 96 % | HEIGHT: 61 IN | WEIGHT: 210.56 LBS | BODY MASS INDEX: 39.75 KG/M2 | DIASTOLIC BLOOD PRESSURE: 80 MMHG

## 2023-02-22 DIAGNOSIS — J02.9 SORE THROAT: ICD-10-CM

## 2023-02-22 DIAGNOSIS — J10.1 INFLUENZA A: Primary | ICD-10-CM

## 2023-02-22 DIAGNOSIS — R05.1 ACUTE COUGH: ICD-10-CM

## 2023-02-22 DIAGNOSIS — R52 BODY ACHES: ICD-10-CM

## 2023-02-22 PROBLEM — F43.21 GRIEF: Status: RESOLVED | Noted: 2023-02-16 | Resolved: 2023-02-22

## 2023-02-22 PROBLEM — K21.9 GASTROESOPHAGEAL REFLUX DISEASE: Status: ACTIVE | Noted: 2023-02-22

## 2023-02-22 PROBLEM — E78.5 HYPERLIPIDEMIA: Status: ACTIVE | Noted: 2023-02-22

## 2023-02-22 PROBLEM — N20.0 CALCULUS OF KIDNEY: Status: ACTIVE | Noted: 2023-02-22

## 2023-02-22 PROBLEM — J32.9 SINUSITIS: Status: ACTIVE | Noted: 2023-02-22

## 2023-02-22 PROBLEM — E66.9 OBESITY: Status: ACTIVE | Noted: 2023-02-22

## 2023-02-22 PROBLEM — G43.909 MIGRAINE HEADACHE: Status: ACTIVE | Noted: 2023-02-22

## 2023-02-22 PROBLEM — E55.9 VITAMIN D DEFICIENCY: Status: ACTIVE | Noted: 2023-02-22

## 2023-02-22 PROBLEM — F41.9 ANXIETY DISORDER: Status: ACTIVE | Noted: 2023-02-22

## 2023-02-22 PROBLEM — E66.01 MORBID OBESITY: Status: RESOLVED | Noted: 2023-01-13 | Resolved: 2023-02-22

## 2023-02-22 PROBLEM — G25.81 RESTLESS LEGS SYNDROME: Status: ACTIVE | Noted: 2023-02-22

## 2023-02-22 PROBLEM — N20.0 CALCULUS OF KIDNEY: Status: RESOLVED | Noted: 2023-02-22 | Resolved: 2023-02-22

## 2023-02-22 LAB
CTP QC/QA: YES
CTP QC/QA: YES
FLUAV AG NPH QL: POSITIVE
FLUBV AG NPH QL: NEGATIVE
SARS-COV-2 AG RESP QL IA.RAPID: NEGATIVE

## 2023-02-22 PROCEDURE — 3074F SYST BP LT 130 MM HG: CPT | Mod: ,,, | Performed by: NURSE PRACTITIONER

## 2023-02-22 PROCEDURE — 3079F PR MOST RECENT DIASTOLIC BLOOD PRESSURE 80-89 MM HG: ICD-10-PCS | Mod: ,,, | Performed by: NURSE PRACTITIONER

## 2023-02-22 PROCEDURE — 1159F MED LIST DOCD IN RCRD: CPT | Mod: ,,, | Performed by: NURSE PRACTITIONER

## 2023-02-22 PROCEDURE — 3008F PR BODY MASS INDEX (BMI) DOCUMENTED: ICD-10-PCS | Mod: ,,, | Performed by: NURSE PRACTITIONER

## 2023-02-22 PROCEDURE — 1159F PR MEDICATION LIST DOCUMENTED IN MEDICAL RECORD: ICD-10-PCS | Mod: ,,, | Performed by: NURSE PRACTITIONER

## 2023-02-22 PROCEDURE — 3008F BODY MASS INDEX DOCD: CPT | Mod: ,,, | Performed by: NURSE PRACTITIONER

## 2023-02-22 PROCEDURE — 87400 INFLUENZA A/B EACH AG IA: CPT | Mod: QW,RHCUB | Performed by: NURSE PRACTITIONER

## 2023-02-22 PROCEDURE — 1160F RVW MEDS BY RX/DR IN RCRD: CPT | Mod: ,,, | Performed by: NURSE PRACTITIONER

## 2023-02-22 PROCEDURE — 99214 PR OFFICE/OUTPT VISIT, EST, LEVL IV, 30-39 MIN: ICD-10-PCS | Mod: ,,, | Performed by: NURSE PRACTITIONER

## 2023-02-22 PROCEDURE — 3079F DIAST BP 80-89 MM HG: CPT | Mod: ,,, | Performed by: NURSE PRACTITIONER

## 2023-02-22 PROCEDURE — 87811 SARS-COV-2 COVID19 W/OPTIC: CPT | Mod: QW,RHCUB | Performed by: NURSE PRACTITIONER

## 2023-02-22 PROCEDURE — 1160F PR REVIEW ALL MEDS BY PRESCRIBER/CLIN PHARMACIST DOCUMENTED: ICD-10-PCS | Mod: ,,, | Performed by: NURSE PRACTITIONER

## 2023-02-22 PROCEDURE — 3074F PR MOST RECENT SYSTOLIC BLOOD PRESSURE < 130 MM HG: ICD-10-PCS | Mod: ,,, | Performed by: NURSE PRACTITIONER

## 2023-02-22 PROCEDURE — 99214 OFFICE O/P EST MOD 30 MIN: CPT | Mod: ,,, | Performed by: NURSE PRACTITIONER

## 2023-02-22 RX ORDER — BENZONATATE 100 MG/1
100 CAPSULE ORAL 3 TIMES DAILY PRN
Qty: 30 CAPSULE | Refills: 0 | Status: SHIPPED | OUTPATIENT
Start: 2023-02-22 | End: 2023-03-04

## 2023-02-22 RX ORDER — OSELTAMIVIR PHOSPHATE 75 MG/1
75 CAPSULE ORAL 2 TIMES DAILY
Qty: 10 CAPSULE | Refills: 0 | Status: SHIPPED | OUTPATIENT
Start: 2023-02-22 | End: 2023-02-27

## 2023-02-22 NOTE — PROGRESS NOTES
Patient ID: [id number]     Chief Complaint: Cough, Generalized Body Aches, Sore Throat, and Otalgia      HPI:     Sophy Iyer is a 62 y.o. female here today for a follow up on low white blood cell count which has been worked up by Hematology.  Today however she does complain of a cough that started 2 days ago as well as a headache, body aches, sore throat.  She denies any fever.  She denies any wheezing or shortness of breath.  No N/V/D.       Past Medical History:  has a past medical history of Anemia, Anxiety, Bipolar disorder, Calculus of kidney, Chronic sinusitis, unspecified, Depression, Fatigue, GERD (gastroesophageal reflux disease), Grief, Kidney stone, Left shoulder pain, Migraine, Mixed hyperlipidemia, Obesity, unspecified, Restless legs syndrome (RLS), Trapezius muscle spasm, and Vitamin D deficiency.    Surgical History:  has a past surgical history that includes Cholecystectomy; Hysterectomy; Colon biopsy; Carpal tunnel release; and Colon surgery.    Family History: family history includes Bipolar disorder in her father and paternal grandmother; Coronary artery disease in her father; Depression in her father and paternal grandmother; Hypertension in her father and sister; Hypothyroidism in her father, mother, and sister; Parkinsonism in her mother.    Social History:  reports that she has never smoked. She has never used smokeless tobacco. She reports that she does not drink alcohol and does not use drugs.    Current Outpatient Medications   Medication Instructions    albuterol (PROVENTIL/VENTOLIN HFA) 90 mcg/actuation inhaler INHALE 2 PUFFS EVERY 4 HOURS FOR SHORTNESS OF BREATH OR WHEEZING -- SHAKE WELL BEFORE USE    atorvastatin (LIPITOR) 40 mg, Oral    benzonatate (TESSALON) 100 mg, Oral, 3 times daily PRN    carBAMazepine (TEGRETOL) 200 mg tablet No dose, route, or frequency recorded.    ergocalciferol (ERGOCALCIFEROL) 50,000 unit Cap No dose, route, or frequency recorded.    fluticasone  "propionate (FLONASE) 50 mcg/actuation nasal spray No dose, route, or frequency recorded.    naproxen (NAPROSYN) 500 MG tablet TAKE 1 TABLET TWICE DAILY WITH FOOD FOR PAIN / INFLAMMATION    oseltamivir (TAMIFLU) 75 mg, Oral, 2 times daily    paroxetine (PAXIL) 30 mg, Oral, Daily    pumpkin seed extract/soy germ (AZO BLADDER CONTROL ORAL) No dose, route, or frequency recorded.    rizatriptan (MAXALT) 10 MG tablet No dose, route, or frequency recorded.    rOPINIRole (REQUIP) 0.5 mg, Oral, Nightly    topiramate (TOPAMAX) 25 mg, Oral    traZODone (DESYREL) 150 MG tablet TAKE 1 TABLET AT BEDTIME AS NEEDED FOR INSOMNIA       Patient is allergic to penicillin and codeine.     Patient Care Team:  SOPHIE Cadena as PCP - General (Family Medicine)  JULIO Gutierrez as Nurse Practitioner (Psychiatry)  Han Ramírez MD as Consulting Physician (Hematology and Oncology)       Subjective:     Review of Systems    See HPI     Objective:     Visit Vitals  /80 (BP Location: Left arm)   Pulse 60   Temp 97.9 °F (36.6 °C) (Temporal)   Ht 5' 1" (1.549 m)   Wt 95.5 kg (210 lb 8.6 oz)   SpO2 96%   BMI 39.78 kg/m²       Physical Exam    General: overweight in no acute distress  Eye: PERRLA, EOMI, clear conjunctiva, eyelids normal  HENT: TMs/ear canals clear, oropharynx without erythema/exudate, maxillary/frontal sinus tenderness to palpation  Neck: full range of motion, no thyromegaly or lymphadenopathy  Respiratory: clear to auscultation bilaterally  Cardiovascular: regular rate and rhythm without murmurs  Gastrointestinal: soft, non-tender, non-distended with normal bowel sounds, without masses to palpation  Musculoskeletal: BOURGEOIS  Integumentary: no rashes or skin lesions present  Neurologic: motor/sensory function intact    Assessment:       ICD-10-CM ICD-9-CM   1. Influenza A  J10.1 487.1   2. Body aches  R52 780.96   3. Acute cough  R05.1 786.2   4. Sore throat  J02.9 462        Plan:     Start Tamiflu, " Lauren Oneal   RTC/notify clinic if no improvement in 1 week or for any worsening of symptoms.    Encouraged to use over-the-counter medications for symptoms.    Medications Ordered This Encounter   Medications    benzonatate (TESSALON) 100 MG capsule     Sig: Take 1 capsule (100 mg total) by mouth 3 (three) times daily as needed for Cough.     Dispense:  30 capsule     Refill:  0    oseltamivir (TAMIFLU) 75 MG capsule     Sig: Take 1 capsule (75 mg total) by mouth 2 (two) times daily. for 5 days     Dispense:  10 capsule     Refill:  0        Follow up in about 6 months (around 8/22/2023). In addition to their scheduled follow up, the patient has also been instructed to follow up on as needed basis.     Future Appointments   Date Time Provider Department Center   5/9/2023  2:30 PM JULIO Gutierrez [facility]   5/16/2023  8:55 AM LAB, [facility] LABORATORY DRAW STATION [facility]   5/23/2023  8:30 AM SOPHIE Cadena [facility]   6/14/2023 10:40 AM Alie Patel NP [facility] [place]

## 2023-02-23 ENCOUNTER — PATIENT MESSAGE (OUTPATIENT)
Dept: ADMINISTRATIVE | Facility: HOSPITAL | Age: 63
End: 2023-02-23
Payer: MEDICARE

## 2023-03-23 DIAGNOSIS — F31.9 BIPOLAR AFFECTIVE DISORDER, REMISSION STATUS UNSPECIFIED: Primary | ICD-10-CM

## 2023-03-24 RX ORDER — CARBAMAZEPINE 200 MG/1
TABLET ORAL
Qty: 60 TABLET | Refills: 2 | Status: SHIPPED | OUTPATIENT
Start: 2023-03-24 | End: 2023-05-24 | Stop reason: SDUPTHER

## 2023-05-01 ENCOUNTER — TELEPHONE (OUTPATIENT)
Dept: FAMILY MEDICINE | Facility: CLINIC | Age: 63
End: 2023-05-01
Payer: MEDICARE

## 2023-05-01 DIAGNOSIS — G25.81 RESTLESS LEGS SYNDROME: Primary | ICD-10-CM

## 2023-05-01 RX ORDER — ROPINIROLE 1 MG/1
1 TABLET, FILM COATED ORAL NIGHTLY
Qty: 30 TABLET | Refills: 3 | Status: SHIPPED | OUTPATIENT
Start: 2023-05-01 | End: 2023-08-16 | Stop reason: SDUPTHER

## 2023-05-01 RX ORDER — ROPINIROLE 1 MG/1
1 TABLET, FILM COATED ORAL NIGHTLY
COMMUNITY
End: 2023-05-01 | Stop reason: SDUPTHER

## 2023-05-01 NOTE — TELEPHONE ENCOUNTER
----- Message from SOPHIE Cadena sent at 5/1/2023 12:13 PM CDT -----  Regarding: RE: call back  Ok to increase to 1 mg at bedtime.    ----- Message -----  From: Gabriela Moulton LPN  Sent: 5/1/2023  10:26 AM CDT  To: SOPHIE Cadena  Subject: FW: call back                                    Can her requip be increased. Please advise.  ----- Message -----  From: Lynn Todd  Sent: 5/1/2023  10:23 AM CDT  To: David LIRA Staff  Subject: call back                                        rOPINIRole (REQUIP) 0.5 MG tablet    Pt called wanting to know if the dosage on this med can be increased, she said she isn't finding to be working anymore and if so she will need a refill on it.       [facility]        [phone]

## 2023-05-05 DIAGNOSIS — G47.00 INSOMNIA, UNSPECIFIED TYPE: ICD-10-CM

## 2023-05-05 DIAGNOSIS — F32.9 MAJOR DEPRESSIVE DISORDER WITH CURRENT ACTIVE EPISODE, UNSPECIFIED DEPRESSION EPISODE SEVERITY, UNSPECIFIED WHETHER RECURRENT: ICD-10-CM

## 2023-05-05 RX ORDER — PAROXETINE 30 MG/1
TABLET, FILM COATED ORAL
Qty: 30 TABLET | Refills: 1 | Status: SHIPPED | OUTPATIENT
Start: 2023-05-05 | End: 2023-05-24 | Stop reason: SDUPTHER

## 2023-05-05 RX ORDER — TRAZODONE HYDROCHLORIDE 150 MG/1
TABLET ORAL
Qty: 30 TABLET | Refills: 1 | Status: SHIPPED | OUTPATIENT
Start: 2023-05-05 | End: 2023-05-24 | Stop reason: SDUPTHER

## 2023-05-05 NOTE — TELEPHONE ENCOUNTER
----- Message from Lynn Todd sent at 5/4/2023  8:13 AM CDT -----  Regarding: refills   traZODone (DESYREL) 150 MG tablet    paroxetine (PAXIL) 30 MG tablet        [facility]       [phone]

## 2023-05-05 NOTE — TELEPHONE ENCOUNTER
Telephoned pt and she informed she had to move her appt from the 9th as she is having dental surgery.

## 2023-05-16 PROCEDURE — 85027 COMPLETE CBC AUTOMATED: CPT | Performed by: NURSE PRACTITIONER

## 2023-05-16 PROCEDURE — 82306 VITAMIN D 25 HYDROXY: CPT | Performed by: NURSE PRACTITIONER

## 2023-05-16 PROCEDURE — 80061 LIPID PANEL: CPT | Performed by: NURSE PRACTITIONER

## 2023-05-16 PROCEDURE — 80053 COMPREHEN METABOLIC PANEL: CPT | Performed by: NURSE PRACTITIONER

## 2023-05-23 ENCOUNTER — APPOINTMENT (OUTPATIENT)
Dept: RADIOLOGY | Facility: CLINIC | Age: 63
End: 2023-05-23
Attending: NURSE PRACTITIONER
Payer: MEDICARE

## 2023-05-23 ENCOUNTER — OFFICE VISIT (OUTPATIENT)
Dept: FAMILY MEDICINE | Facility: CLINIC | Age: 63
End: 2023-05-23
Payer: MEDICARE

## 2023-05-23 VITALS
BODY MASS INDEX: 43.19 KG/M2 | HEART RATE: 68 BPM | HEIGHT: 60 IN | TEMPERATURE: 98 F | SYSTOLIC BLOOD PRESSURE: 124 MMHG | WEIGHT: 220 LBS | DIASTOLIC BLOOD PRESSURE: 72 MMHG | OXYGEN SATURATION: 98 %

## 2023-05-23 DIAGNOSIS — L82.1 SK (SEBORRHEIC KERATOSIS): ICD-10-CM

## 2023-05-23 DIAGNOSIS — R20.2 NUMBNESS AND TINGLING IN BOTH HANDS: ICD-10-CM

## 2023-05-23 DIAGNOSIS — R20.0 NUMBNESS AND TINGLING IN BOTH HANDS: ICD-10-CM

## 2023-05-23 DIAGNOSIS — M54.2 NECK PAIN: ICD-10-CM

## 2023-05-23 DIAGNOSIS — Z12.31 SCREENING MAMMOGRAM FOR BREAST CANCER: Primary | ICD-10-CM

## 2023-05-23 PROCEDURE — 1159F PR MEDICATION LIST DOCUMENTED IN MEDICAL RECORD: ICD-10-PCS | Mod: ,,, | Performed by: NURSE PRACTITIONER

## 2023-05-23 PROCEDURE — 3074F SYST BP LT 130 MM HG: CPT | Mod: ,,, | Performed by: NURSE PRACTITIONER

## 2023-05-23 PROCEDURE — 3008F PR BODY MASS INDEX (BMI) DOCUMENTED: ICD-10-PCS | Mod: ,,, | Performed by: NURSE PRACTITIONER

## 2023-05-23 PROCEDURE — 3008F BODY MASS INDEX DOCD: CPT | Mod: ,,, | Performed by: NURSE PRACTITIONER

## 2023-05-23 PROCEDURE — 1160F RVW MEDS BY RX/DR IN RCRD: CPT | Mod: ,,, | Performed by: NURSE PRACTITIONER

## 2023-05-23 PROCEDURE — 1159F MED LIST DOCD IN RCRD: CPT | Mod: ,,, | Performed by: NURSE PRACTITIONER

## 2023-05-23 PROCEDURE — 1160F PR REVIEW ALL MEDS BY PRESCRIBER/CLIN PHARMACIST DOCUMENTED: ICD-10-PCS | Mod: ,,, | Performed by: NURSE PRACTITIONER

## 2023-05-23 PROCEDURE — 99214 PR OFFICE/OUTPT VISIT, EST, LEVL IV, 30-39 MIN: ICD-10-PCS | Mod: ,,, | Performed by: NURSE PRACTITIONER

## 2023-05-23 PROCEDURE — 3074F PR MOST RECENT SYSTOLIC BLOOD PRESSURE < 130 MM HG: ICD-10-PCS | Mod: ,,, | Performed by: NURSE PRACTITIONER

## 2023-05-23 PROCEDURE — 3078F DIAST BP <80 MM HG: CPT | Mod: ,,, | Performed by: NURSE PRACTITIONER

## 2023-05-23 PROCEDURE — 72040 X-RAY EXAM NECK SPINE 2-3 VW: CPT | Mod: TC,RHCUB | Performed by: NURSE PRACTITIONER

## 2023-05-23 PROCEDURE — 99214 OFFICE O/P EST MOD 30 MIN: CPT | Mod: ,,, | Performed by: NURSE PRACTITIONER

## 2023-05-23 PROCEDURE — 3078F PR MOST RECENT DIASTOLIC BLOOD PRESSURE < 80 MM HG: ICD-10-PCS | Mod: ,,, | Performed by: NURSE PRACTITIONER

## 2023-05-23 RX ORDER — ALBUTEROL SULFATE 90 UG/1
AEROSOL, METERED RESPIRATORY (INHALATION)
Qty: 18 G | Refills: 2 | Status: SHIPPED | OUTPATIENT
Start: 2023-05-23

## 2023-05-23 RX ORDER — ERGOCALCIFEROL 1.25 MG/1
50000 CAPSULE ORAL
Qty: 12 CAPSULE | Refills: 3 | Status: SHIPPED | OUTPATIENT
Start: 2023-05-23

## 2023-05-23 RX ORDER — VIBEGRON 75 MG/1
1 TABLET, FILM COATED ORAL DAILY
COMMUNITY
Start: 2023-05-18

## 2023-05-23 RX ORDER — TOPIRAMATE 25 MG/1
25 TABLET ORAL 2 TIMES DAILY
Qty: 180 TABLET | Refills: 3 | Status: SHIPPED | OUTPATIENT
Start: 2023-05-23

## 2023-05-23 NOTE — PROGRESS NOTES
Patient ID: [id number]     Chief Complaint: Follow-up (3 month with labs)      HPI:     Sophy Iyer is a 62 y.o. female here today for routine visit.      Migraines increase over the last several montsh, off topamax for unknown reasons for about that same amount of time.     She was having increased RLS symptoms and call the office less than a month ago.  Increased requip and all of her symptoms have improved.    C/o neck and bilateral shoulder pain.  She's tried PT in the alst 4-5 years, prescribed by Dr. Esqueda.  She's questioning a breast reduction to see if that would help.  She was told she had arthritis in her neck.  No recent xrays.  Wakes up with arm numbness, mostly left but sometimes right too. Has been diagnosed with CTS bilateral 2007, right only repaired.      She needs referral to derm.  Has numerous skin lesions that she states were frozen off by previous PCP.  They have returned and have become larger than before.  She also has some large moles on her back which she states are growing.    Her labs were reviewed with her today.  She is been off vitamin-D for unknown amount of time.    She reports having a hematology follow up soon.       Past Medical History:  has a past medical history of Anemia, Anxiety, Bipolar disorder, Calculus of kidney, Chronic sinusitis, unspecified, Depression, Fatigue, GERD (gastroesophageal reflux disease), Grief, Kidney stone, Left shoulder pain, Migraine, Mixed hyperlipidemia, Obesity, unspecified, Restless legs syndrome (RLS), Trapezius muscle spasm, and Vitamin D deficiency.    Surgical History:  has a past surgical history that includes Cholecystectomy; Hysterectomy; Colon biopsy; Carpal tunnel release; and Colon surgery.    Family History: family history includes Bipolar disorder in her father and paternal grandmother; Coronary artery disease in her father; Depression in her father and paternal grandmother; Hypertension in her father and sister; Hypothyroidism in  "her father, mother, and sister; Parkinsonism in her mother.    Social History:  reports that she has never smoked. She has never used smokeless tobacco. She reports that she does not drink alcohol and does not use drugs.    Current Outpatient Medications   Medication Instructions    albuterol (PROVENTIL/VENTOLIN HFA) 90 mcg/actuation inhaler INHALE 2 PUFFS EVERY 4 HOURS FOR SHORTNESS OF BREATH OR WHEEZING -- SHAKE WELL BEFORE USE    atorvastatin (LIPITOR) 40 mg, Oral    carBAMazepine (TEGRETOL) 200 mg tablet TAKE 1 TABLET TWICE DAILY    ergocalciferol (ERGOCALCIFEROL) 50,000 Units, Oral, Every 7 days    fluticasone propionate (FLONASE) 50 mcg/actuation nasal spray No dose, route, or frequency recorded.    GEMTESA 75 mg Tab 1 tablet, Oral, Daily    naproxen (NAPROSYN) 500 MG tablet TAKE 1 TABLET TWICE DAILY WITH FOOD FOR PAIN / INFLAMMATION    paroxetine (PAXIL) 30 MG tablet TAKE 1 TABLET DAILY FOR DEPRESSION    rizatriptan (MAXALT) 10 MG tablet No dose, route, or frequency recorded.    rOPINIRole (REQUIP) 1 mg, Oral, Nightly    topiramate (TOPAMAX) 25 mg, Oral, 2 times daily    traZODone (DESYREL) 150 MG tablet TAKE 1 TABLET AT BEDTIME AS NEEDED FOR INSOMNIA       Patient is allergic to penicillin and codeine.     Patient Care Team:  SOPHIE Cadena as PCP - General (Family Medicine)  JULIO Gutierrez as Nurse Practitioner (Psychiatry)  Han Ramírez MD as Consulting Physician (Hematology and Oncology)       Subjective:     Review of Systems    See HPI     Objective:     Visit Vitals  /72 (BP Location: Left arm, Patient Position: Sitting, BP Method: Large (Manual))   Pulse 68   Temp 98.4 °F (36.9 °C) (Oral)   Ht 4' 11.84" (1.52 m)   Wt 99.8 kg (220 lb 0.3 oz)   SpO2 98%   BMI 43.20 kg/m²       Physical Exam  Vitals reviewed.   Constitutional:       Appearance: Normal appearance.   Eyes:      Pupils: Pupils are equal, round, and reactive to light.   Cardiovascular:      Rate and Rhythm: " Normal rate and regular rhythm.      Heart sounds: Normal heart sounds.   Pulmonary:      Effort: Pulmonary effort is normal.      Breath sounds: Normal breath sounds.   Musculoskeletal:      Right lower leg: No edema.      Left lower leg: No edema.   Skin:     General: Skin is warm and dry.      Findings: Lesion present.   Neurological:      Mental Status: She is alert and oriented to person, place, and time.   Psychiatric:         Mood and Affect: Mood normal.         Behavior: Behavior normal.         Thought Content: Thought content normal.         Judgment: Judgment normal.     Labs Reviewed:     Chemistry:  Lab Results   Component Value Date     05/16/2023    K 4.5 05/16/2023    CHLORIDE 110 05/16/2023    BUN 15.0 05/16/2023    CREATININE 0.68 05/16/2023    EGFRNORACEVR >90 05/16/2023    GLUCOSE 110 05/16/2023    CALCIUM 9.3 05/16/2023    ALKPHOS 95 05/16/2023    LABPROT 6.4 05/16/2023    ALBUMIN 3.9 05/16/2023    BILIDIR 0.30 11/25/2018    IBILI 0.30 11/25/2018    AST 37 (H) 05/16/2023    ALT 24 05/16/2023    PMYHUSJB03IL <20.0 (L) 05/16/2023    TSH 2.84 08/19/2021        Lab Results   Component Value Date    HGBA1C 5.1 08/19/2021        Hematology:  Lab Results   Component Value Date    WBC 3.35 (L) 05/16/2023    RBC 3.97 (L) 05/16/2023    HGB 12.3 05/16/2023    HCT 36.6 05/16/2023    MCV 92.2 05/16/2023    MCH 31.0 05/16/2023    MCHC 33.6 05/16/2023    RDW 13.4 05/16/2023     (L) 05/16/2023    MPV 10.6 05/16/2023       Lipid Panel:  Lab Results   Component Value Date    CHOL 212 (H) 05/16/2023     (H) 05/16/2023    DLDL 76.6 05/16/2023    TRIG 54 05/16/2023      Assessment:       ICD-10-CM ICD-9-CM   1. Screening mammogram for breast cancer  Z12.31 V76.12   2. Neck pain  M54.2 723.1   3. Numbness and tingling in both hands  R20.0 782.0    R20.2    4. SK (seborrheic keratosis)  L82.1 702.19        Plan:       Restart Topamax   Restart vitamin-D   Mammogram ordered today  Encouraged  over-the-counter MVI   X-ray cervical spine today, consider referral for breast reduction  Check EMG BUE  Encouraged to keep all psychiatry appointments      Follow up in about 6 months (around 11/23/2023) for Wellness, Labs Prior. In addition to their scheduled follow up, the patient has also been instructed to follow up on as needed basis.     Future Appointments   Date Time Provider Department Center   5/23/2023  9:50 AM [facility] XR1 [facility] XRAY [facility]   5/24/2023 11:00 AM Matthew Ferreira, PMHNP [facility]   6/14/2023 10:40 AM Alie Patel NP [facility] [place]   11/15/2023  9:00 AM LAB, [facility] LABORATORY DRAW STATION [facility]   11/22/2023 10:00 AM [facility]        SOPHIE [place]

## 2023-05-24 ENCOUNTER — OFFICE VISIT (OUTPATIENT)
Dept: BEHAVIORAL HEALTH | Facility: CLINIC | Age: 63
End: 2023-05-24
Payer: MEDICARE

## 2023-05-24 VITALS
HEIGHT: 59 IN | DIASTOLIC BLOOD PRESSURE: 70 MMHG | WEIGHT: 220 LBS | SYSTOLIC BLOOD PRESSURE: 112 MMHG | BODY MASS INDEX: 44.35 KG/M2 | OXYGEN SATURATION: 98 % | TEMPERATURE: 98 F | HEART RATE: 70 BPM

## 2023-05-24 DIAGNOSIS — G47.00 INSOMNIA, UNSPECIFIED TYPE: ICD-10-CM

## 2023-05-24 DIAGNOSIS — F41.1 GENERALIZED ANXIETY DISORDER: ICD-10-CM

## 2023-05-24 DIAGNOSIS — G25.81 RESTLESS LEGS SYNDROME: ICD-10-CM

## 2023-05-24 DIAGNOSIS — F32.9 MAJOR DEPRESSIVE DISORDER WITH CURRENT ACTIVE EPISODE, UNSPECIFIED DEPRESSION EPISODE SEVERITY, UNSPECIFIED WHETHER RECURRENT: ICD-10-CM

## 2023-05-24 DIAGNOSIS — F31.9 BIPOLAR AFFECTIVE DISORDER, REMISSION STATUS UNSPECIFIED: Primary | ICD-10-CM

## 2023-05-24 DIAGNOSIS — F43.21 GRIEF: ICD-10-CM

## 2023-05-24 PROCEDURE — 3078F PR MOST RECENT DIASTOLIC BLOOD PRESSURE < 80 MM HG: ICD-10-PCS | Mod: ,,, | Performed by: NURSE PRACTITIONER

## 2023-05-24 PROCEDURE — 3008F BODY MASS INDEX DOCD: CPT | Mod: ,,, | Performed by: NURSE PRACTITIONER

## 2023-05-24 PROCEDURE — 3074F SYST BP LT 130 MM HG: CPT | Mod: ,,, | Performed by: NURSE PRACTITIONER

## 2023-05-24 PROCEDURE — 3008F PR BODY MASS INDEX (BMI) DOCUMENTED: ICD-10-PCS | Mod: ,,, | Performed by: NURSE PRACTITIONER

## 2023-05-24 PROCEDURE — 3074F PR MOST RECENT SYSTOLIC BLOOD PRESSURE < 130 MM HG: ICD-10-PCS | Mod: ,,, | Performed by: NURSE PRACTITIONER

## 2023-05-24 PROCEDURE — 99214 PR OFFICE/OUTPT VISIT, EST, LEVL IV, 30-39 MIN: ICD-10-PCS | Mod: ,,, | Performed by: NURSE PRACTITIONER

## 2023-05-24 PROCEDURE — 99214 OFFICE O/P EST MOD 30 MIN: CPT | Mod: ,,, | Performed by: NURSE PRACTITIONER

## 2023-05-24 PROCEDURE — 3078F DIAST BP <80 MM HG: CPT | Mod: ,,, | Performed by: NURSE PRACTITIONER

## 2023-05-24 PROCEDURE — 1160F RVW MEDS BY RX/DR IN RCRD: CPT | Mod: ,,, | Performed by: NURSE PRACTITIONER

## 2023-05-24 PROCEDURE — 1159F PR MEDICATION LIST DOCUMENTED IN MEDICAL RECORD: ICD-10-PCS | Mod: ,,, | Performed by: NURSE PRACTITIONER

## 2023-05-24 PROCEDURE — 1160F PR REVIEW ALL MEDS BY PRESCRIBER/CLIN PHARMACIST DOCUMENTED: ICD-10-PCS | Mod: ,,, | Performed by: NURSE PRACTITIONER

## 2023-05-24 PROCEDURE — 1159F MED LIST DOCD IN RCRD: CPT | Mod: ,,, | Performed by: NURSE PRACTITIONER

## 2023-05-24 RX ORDER — CARBAMAZEPINE 200 MG/1
200 TABLET ORAL 2 TIMES DAILY
Qty: 60 TABLET | Refills: 2 | Status: SHIPPED | OUTPATIENT
Start: 2023-05-24 | End: 2023-08-16 | Stop reason: SDUPTHER

## 2023-05-24 RX ORDER — TRAZODONE HYDROCHLORIDE 150 MG/1
TABLET ORAL
Qty: 30 TABLET | Refills: 2 | Status: SHIPPED | OUTPATIENT
Start: 2023-05-24 | End: 2023-08-16 | Stop reason: SDUPTHER

## 2023-05-24 RX ORDER — PAROXETINE 30 MG/1
30 TABLET, FILM COATED ORAL DAILY
Qty: 30 TABLET | Refills: 2 | Status: SHIPPED | OUTPATIENT
Start: 2023-05-24 | End: 2023-08-16 | Stop reason: SDUPTHER

## 2023-05-24 NOTE — PROGRESS NOTES
"PSYCHIATRIC FOLLOW-UP VISIT NOTE    Chief Complaint   Patient presents with    Medication Refill     "I have no complaints, need refill."  Tegretol         History of Present Illness  62 y.o. year old White female with hx of bipolar, LILLY, grief, insomnia, and restless legs syndrome seen today for follow-up appointment and medication management.  Reports she is been doing very well on current medications.  Denies any recent depression.  Describes mood as happy and good most days of the week.  No appetite changes, anhedonia, isolative behaviors, feelings of guilt or shame, feelings of hopelessness and helplessness, irritability, mood lability, manic signs or symptoms, racing thoughts, or increased impulsivity reported.  Feels her anxiety has also been well-controlled.  No moderate to severe symptoms reported.  When minor symptoms do arise she is able to cope effectively and proceed with her daily life.  Has been having some health issues including having some teeth removed and complications from degenerative disc disease.  She is awaiting an appointment with an orthopedist and neurologist.  Reports he is not had a significant effect on her mood or ability to function daily from a psychiatric point of view but they do come with some pain issues.  She is been exercising most days of the week but the warmer weather has led to some decrease in his.  She denies any side effects from current medications.  Tolerating current medications without side effects. Has labs scheduled for November and we will obtain a carbamazepine level at that time. Patient denies SI/HI. Denies hallucinations and does not appear to be responding to internal stimuli or be internally preoccupied. No manic symptoms noted.       Objective:     Vitals:  Vitals:    05/24/23 1102   BP: 112/70   BP Location: Left arm   Patient Position: Sitting   Pulse: 70   Temp: 97.9 °F (36.6 °C)   TempSrc: Temporal   SpO2: 98%   Weight: 99.8 kg (220 lb)   Height: 4' " "11" (1.499 m)       Wt Readings from Last 3 Encounters:   05/24/23 1102 99.8 kg (220 lb)   05/23/23 0831 99.8 kg (220 lb 0.3 oz)   02/22/23 1119 95.5 kg (210 lb 8.6 oz)         Medication:    Current Outpatient Medications:     albuterol (PROVENTIL/VENTOLIN HFA) 90 mcg/actuation inhaler, INHALE 2 PUFFS EVERY 4 HOURS FOR SHORTNESS OF BREATH OR WHEEZING -- SHAKE WELL BEFORE USE, Disp: 18 g, Rfl: 2    atorvastatin (LIPITOR) 40 MG tablet, Take 40 mg by mouth., Disp: , Rfl:     ergocalciferol (ERGOCALCIFEROL) 50,000 unit Cap, Take 1 capsule (50,000 Units total) by mouth every 7 days., Disp: 12 capsule, Rfl: 3    fluticasone propionate (FLONASE) 50 mcg/actuation nasal spray, , Disp: , Rfl:     GEMTESA 75 mg Tab, Take 1 tablet by mouth Daily., Disp: , Rfl:     naproxen (NAPROSYN) 500 MG tablet, TAKE 1 TABLET TWICE DAILY WITH FOOD FOR PAIN / INFLAMMATION, Disp: , Rfl:     rizatriptan (MAXALT) 10 MG tablet, , Disp: , Rfl:     rOPINIRole (REQUIP) 1 MG tablet, Take 1 tablet (1 mg total) by mouth every evening., Disp: 30 tablet, Rfl: 3    topiramate (TOPAMAX) 25 MG tablet, Take 1 tablet (25 mg total) by mouth 2 (two) times daily., Disp: 180 tablet, Rfl: 3    carBAMazepine (TEGRETOL) 200 mg tablet, Take 1 tablet (200 mg total) by mouth 2 (two) times daily., Disp: 60 tablet, Rfl: 2    paroxetine (PAXIL) 30 MG tablet, Take 1 tablet (30 mg total) by mouth once daily., Disp: 30 tablet, Rfl: 2    traZODone (DESYREL) 150 MG tablet, TAKE 1 TABLET AT BEDTIME AS NEEDED FOR INSOMNIA, Disp: 30 tablet, Rfl: 2       Significant Labs: - reviewed labs from May 2023    Significant Imaging: - none at this time    Physical Exam  Vitals and nursing note reviewed.   Constitutional:       General: She is awake.      Appearance: Normal appearance.   Musculoskeletal:      Comments: Full ROM   Neurological:      Mental Status: She is alert.   Psychiatric:         Attention and Perception: Attention and perception normal. She does not perceive auditory " "or visual hallucinations.         Mood and Affect: Affect normal.         Speech: Speech normal.         Behavior: Behavior is cooperative.         Thought Content: Thought content does not include homicidal or suicidal ideation.         Cognition and Memory: Cognition and memory normal.        Review of Systems     Mental Status Exam:  Presentation:  - Appearance: 62 y.o. year old White female, appears stated age, appears Casually dressed and Well groomed  - Motility: Erect when standing, Steady gait, No EPS or Tremors, No psychomotor agitation or retardation appreciated  - Behavior: calm, cooperative, good eye contact  Speech:  - Character/Organization: spontaneous, fluent, normal volume, normal rate, normal rhythm  Emotional State:  - Mood: "happy"   - Affect: congruent and appropriate  Thought:  - Process: logical, linear, organized , goal-directed  - Preoccupations: no ruminations, rituals, or phobias appreciated  - Delusions: no persecutory, paranoid, or grandiose delusions appreciated  - Perception: denies AVH, not actively responding to internal stimuli  - SI/HI: denies/denies  Sensorium & Intellect:  - Sensorium: AAOx4  - Memory: intact to recent and remote events  - Attention/Concentration: good/good  - Insight/Judgement: good/good    Gait: normal swing and stance  MSK:no rigidity appreciated    All other systems without acute issues unless noted in HPI      Assessment/Plan      ICD-10-CM ICD-9-CM    1. Bipolar affective disorder, remission status unspecified  F31.9 296.80 paroxetine (PAXIL) 30 MG tablet      carBAMazepine (TEGRETOL) 200 mg tablet      2. Generalized anxiety disorder  F41.1 300.02 paroxetine (PAXIL) 30 MG tablet      3. Grief  F43.21 309.0       4. Restless legs syndrome  G25.81 333.94       5. Insomnia, unspecified type  G47.00 780.52 traZODone (DESYREL) 150 MG tablet      6. Major depressive disorder with current active episode, unspecified depression episode severity, unspecified " whether recurrent  F32.9 296.30          Continue current medications without change    Obtain carbamazepine level in November when she completes labs for her PCP    Potential side effects and risks vs benefits of current treatment plan reviewed with patient. Applicable black box warnings reviewed. Encouraged patient not to alter dosages or abruptly discontinue medications without contacting prescriber first, due to risk of worsening symptoms and decompensation of mental status. Warned of risks associated with herbal remedies and supplements while taking psychotropic medications and of the need to consult prescriber prior to adding any of these to current regimen. Patient should abstain from abuse of alcohol, prescription medications, and illicit drugs. Reviewed when to contact clinic and/or seek emergent care, such as but not limited to, onset/worsening SI/HI, hallucinations, delusions, manic symptoms. Pt verbalized understanding and agreement of these warnings/recommendations and verbally consented to treatment plan.      Follow up in about 3 months (around 8/24/2023) for Medication Management.    Matthew Ferreira, MAYTEP

## 2023-07-26 ENCOUNTER — PATIENT MESSAGE (OUTPATIENT)
Dept: FAMILY MEDICINE | Facility: CLINIC | Age: 63
End: 2023-07-26
Payer: MEDICARE

## 2023-08-03 ENCOUNTER — TELEPHONE (OUTPATIENT)
Dept: FAMILY MEDICINE | Facility: CLINIC | Age: 63
End: 2023-08-03
Payer: MEDICARE

## 2023-08-03 DIAGNOSIS — Z12.11 SCREENING FOR COLON CANCER: Primary | ICD-10-CM

## 2023-08-08 DIAGNOSIS — G43.919 INTRACTABLE MIGRAINE WITHOUT STATUS MIGRAINOSUS, UNSPECIFIED MIGRAINE TYPE: Primary | ICD-10-CM

## 2023-08-08 RX ORDER — RIZATRIPTAN BENZOATE 10 MG/1
TABLET ORAL
Qty: 30 TABLET | Refills: 5 | Status: SHIPPED | OUTPATIENT
Start: 2023-08-08

## 2023-08-14 ENCOUNTER — OFFICE VISIT (OUTPATIENT)
Dept: FAMILY MEDICINE | Facility: CLINIC | Age: 63
End: 2023-08-14
Payer: MEDICARE

## 2023-08-14 VITALS
HEIGHT: 59 IN | BODY MASS INDEX: 43.88 KG/M2 | DIASTOLIC BLOOD PRESSURE: 74 MMHG | SYSTOLIC BLOOD PRESSURE: 130 MMHG | TEMPERATURE: 97 F | OXYGEN SATURATION: 96 % | HEART RATE: 63 BPM | WEIGHT: 217.63 LBS

## 2023-08-14 DIAGNOSIS — R06.2 WHEEZING: ICD-10-CM

## 2023-08-14 DIAGNOSIS — J32.0 MAXILLARY SINUSITIS, UNSPECIFIED CHRONICITY: Primary | ICD-10-CM

## 2023-08-14 PROCEDURE — 3075F SYST BP GE 130 - 139MM HG: CPT | Mod: ,,, | Performed by: NURSE PRACTITIONER

## 2023-08-14 PROCEDURE — 3008F PR BODY MASS INDEX (BMI) DOCUMENTED: ICD-10-PCS | Mod: ,,, | Performed by: NURSE PRACTITIONER

## 2023-08-14 PROCEDURE — 3078F PR MOST RECENT DIASTOLIC BLOOD PRESSURE < 80 MM HG: ICD-10-PCS | Mod: ,,, | Performed by: NURSE PRACTITIONER

## 2023-08-14 PROCEDURE — 99213 OFFICE O/P EST LOW 20 MIN: CPT | Mod: 25,,, | Performed by: NURSE PRACTITIONER

## 2023-08-14 PROCEDURE — 3075F PR MOST RECENT SYSTOLIC BLOOD PRESS GE 130-139MM HG: ICD-10-PCS | Mod: ,,, | Performed by: NURSE PRACTITIONER

## 2023-08-14 PROCEDURE — 3008F BODY MASS INDEX DOCD: CPT | Mod: ,,, | Performed by: NURSE PRACTITIONER

## 2023-08-14 PROCEDURE — 96372 PR INJECTION,THERAP/PROPH/DIAG2ST, IM OR SUBCUT: ICD-10-PCS | Mod: ,,, | Performed by: NURSE PRACTITIONER

## 2023-08-14 PROCEDURE — 1159F PR MEDICATION LIST DOCUMENTED IN MEDICAL RECORD: ICD-10-PCS | Mod: ,,, | Performed by: NURSE PRACTITIONER

## 2023-08-14 PROCEDURE — 96372 THER/PROPH/DIAG INJ SC/IM: CPT | Mod: ,,, | Performed by: NURSE PRACTITIONER

## 2023-08-14 PROCEDURE — 99213 PR OFFICE/OUTPT VISIT, EST, LEVL III, 20-29 MIN: ICD-10-PCS | Mod: 25,,, | Performed by: NURSE PRACTITIONER

## 2023-08-14 PROCEDURE — 3078F DIAST BP <80 MM HG: CPT | Mod: ,,, | Performed by: NURSE PRACTITIONER

## 2023-08-14 PROCEDURE — 1159F MED LIST DOCD IN RCRD: CPT | Mod: ,,, | Performed by: NURSE PRACTITIONER

## 2023-08-14 RX ORDER — SULFAMETHOXAZOLE AND TRIMETHOPRIM 800; 160 MG/1; MG/1
1 TABLET ORAL 2 TIMES DAILY
Qty: 20 TABLET | Refills: 0 | Status: SHIPPED | OUTPATIENT
Start: 2023-08-14 | End: 2023-08-24

## 2023-08-14 RX ORDER — DEXTROMETHORPHAN POLISTIREX 30 MG/5ML
60 SUSPENSION ORAL 2 TIMES DAILY
Qty: 148 ML | Refills: 1 | Status: SHIPPED | OUTPATIENT
Start: 2023-08-14 | End: 2023-08-24

## 2023-08-14 RX ORDER — DEXAMETHASONE SODIUM PHOSPHATE 100 MG/10ML
10 INJECTION INTRAMUSCULAR; INTRAVENOUS
Status: COMPLETED | OUTPATIENT
Start: 2023-08-14 | End: 2023-08-14

## 2023-08-14 RX ADMIN — DEXAMETHASONE SODIUM PHOSPHATE 10 MG: 100 INJECTION INTRAMUSCULAR; INTRAVENOUS at 09:08

## 2023-08-14 NOTE — PROGRESS NOTES
SUBJECTIVE:  Sophy Iyer is a 62 y.o. female here for Cough (Productive cough x 1 week) and Sore Throat      HPI  ***  Sophy's allergies, medications, history, and problem list were updated as appropriate.    Review of Systems   A comprehensive review of symptoms was completed and negative except as noted above.    No results found for this or any previous visit (from the past 504 hour(s)).    OBJECTIVE:  Vital signs  There were no vitals filed for this visit.     Physical Exam     ASSESSMENT/PLAN:  {There are no diagnoses linked to this encounter. (Refresh or delete this SmartLink)}    Follow Up:  No follow-ups on file.    {Optional documentation below for documenting time spent for a visit to justify LOS. (This text will automatically delete.) :58247}{Time Based Documentation (Optional):65954}

## 2023-08-16 ENCOUNTER — OFFICE VISIT (OUTPATIENT)
Dept: BEHAVIORAL HEALTH | Facility: CLINIC | Age: 63
End: 2023-08-16
Payer: MEDICARE

## 2023-08-16 DIAGNOSIS — F31.9 BIPOLAR AFFECTIVE DISORDER, REMISSION STATUS UNSPECIFIED: Primary | ICD-10-CM

## 2023-08-16 DIAGNOSIS — F41.1 GENERALIZED ANXIETY DISORDER: ICD-10-CM

## 2023-08-16 DIAGNOSIS — G47.00 INSOMNIA, UNSPECIFIED TYPE: ICD-10-CM

## 2023-08-16 DIAGNOSIS — G25.81 RESTLESS LEGS SYNDROME: ICD-10-CM

## 2023-08-16 DIAGNOSIS — Z79.899 LONG TERM USE OF DRUG: ICD-10-CM

## 2023-08-16 PROCEDURE — 1160F RVW MEDS BY RX/DR IN RCRD: CPT | Mod: 95,,, | Performed by: NURSE PRACTITIONER

## 2023-08-16 PROCEDURE — 99214 PR OFFICE/OUTPT VISIT, EST, LEVL IV, 30-39 MIN: ICD-10-PCS | Mod: 95,,, | Performed by: NURSE PRACTITIONER

## 2023-08-16 PROCEDURE — 1160F PR REVIEW ALL MEDS BY PRESCRIBER/CLIN PHARMACIST DOCUMENTED: ICD-10-PCS | Mod: 95,,, | Performed by: NURSE PRACTITIONER

## 2023-08-16 PROCEDURE — 1159F PR MEDICATION LIST DOCUMENTED IN MEDICAL RECORD: ICD-10-PCS | Mod: 95,,, | Performed by: NURSE PRACTITIONER

## 2023-08-16 PROCEDURE — 1159F MED LIST DOCD IN RCRD: CPT | Mod: 95,,, | Performed by: NURSE PRACTITIONER

## 2023-08-16 PROCEDURE — 99214 OFFICE O/P EST MOD 30 MIN: CPT | Mod: 95,,, | Performed by: NURSE PRACTITIONER

## 2023-08-16 RX ORDER — CARBAMAZEPINE 200 MG/1
200 TABLET ORAL 2 TIMES DAILY
Qty: 60 TABLET | Refills: 2 | Status: SHIPPED | OUTPATIENT
Start: 2023-08-16 | End: 2023-11-27 | Stop reason: SDUPTHER

## 2023-08-16 RX ORDER — ROPINIROLE 1 MG/1
1 TABLET, FILM COATED ORAL NIGHTLY
Qty: 30 TABLET | Refills: 0 | Status: SHIPPED | OUTPATIENT
Start: 2023-08-16 | End: 2023-09-25

## 2023-08-16 RX ORDER — PAROXETINE 30 MG/1
30 TABLET, FILM COATED ORAL DAILY
Qty: 30 TABLET | Refills: 2 | Status: SHIPPED | OUTPATIENT
Start: 2023-08-16 | End: 2023-11-27 | Stop reason: SDUPTHER

## 2023-08-16 RX ORDER — TRAZODONE HYDROCHLORIDE 150 MG/1
TABLET ORAL
Qty: 30 TABLET | Refills: 2 | Status: SHIPPED | OUTPATIENT
Start: 2023-08-16 | End: 2023-10-20 | Stop reason: SDUPTHER

## 2023-08-16 NOTE — PROGRESS NOTES
"PSYCHIATRIC FOLLOW-UP VISIT NOTE    Chief Complaint   Patient presents with    3 month follow up     "I have no complaints"         History of Present Illness  62 y.o. year old White female with hx of bipolar disorder and LILLY seen today for follow-up appointment and medication management.  Reports she is been doing quite well since her last follow-up visit.  Currently guilt sinus infection which is why she requested a telehealth visit today.  Denies any recent depression.  Mood has been stable and she denies any irritability or mood lability.  Anxiety well-controlled with current medications.  She has had some worried due to a niece she is close to cutting off contact with her.  Patient was unsure why this has occurred but suspects it has to do with the niece's .  Reports having some sadness whenever this occurred but she is now coping effectively and is considering writing her niece a letter to try and reestablish contact.  She is sleeping well at night and feels rested in the morning.  Denies any side effects from current medication regimen. Patient denies SI/HI. Denies hallucinations and does not appear to be responding to internal stimuli or be internally preoccupied. No manic symptoms noted.       Objective:     Vitals:  There were no vitals filed for this visit.    Wt Readings from Last 3 Encounters:   08/14/23 0929 98.7 kg (217 lb 9.6 oz)   05/24/23 1102 99.8 kg (220 lb)   05/23/23 0831 99.8 kg (220 lb 0.3 oz)         Medication:    Current Outpatient Medications:     albuterol (PROVENTIL/VENTOLIN HFA) 90 mcg/actuation inhaler, INHALE 2 PUFFS EVERY 4 HOURS FOR SHORTNESS OF BREATH OR WHEEZING -- SHAKE WELL BEFORE USE, Disp: 18 g, Rfl: 2    atorvastatin (LIPITOR) 40 MG tablet, Take 40 mg by mouth., Disp: , Rfl:     dextromethorphan (DELSYM 12 HOUR) 30 mg/5 mL liquid, Take 10 mLs (60 mg total) by mouth 2 (two) times daily. for 10 days, Disp: 148 mL, Rfl: 1    ergocalciferol (ERGOCALCIFEROL) 50,000 unit " Cap, Take 1 capsule (50,000 Units total) by mouth every 7 days., Disp: 12 capsule, Rfl: 3    fluticasone propionate (FLONASE) 50 mcg/actuation nasal spray, , Disp: , Rfl:     GEMTESA 75 mg Tab, Take 1 tablet by mouth Daily., Disp: , Rfl:     naproxen (NAPROSYN) 500 MG tablet, TAKE 1 TABLET TWICE DAILY WITH FOOD FOR PAIN / INFLAMMATION, Disp: , Rfl:     rizatriptan (MAXALT) 10 MG tablet, TAKE 1 TABLET DAILY AS NEEDED FOR HEADACHE, Disp: 30 tablet, Rfl: 5    sulfamethoxazole-trimethoprim 800-160mg (BACTRIM DS) 800-160 mg Tab, Take 1 tablet by mouth 2 (two) times daily. for 10 days, Disp: 20 tablet, Rfl: 0    topiramate (TOPAMAX) 25 MG tablet, Take 1 tablet (25 mg total) by mouth 2 (two) times daily., Disp: 180 tablet, Rfl: 3    carBAMazepine (TEGRETOL) 200 mg tablet, Take 1 tablet (200 mg total) by mouth 2 (two) times daily., Disp: 60 tablet, Rfl: 2    paroxetine (PAXIL) 30 MG tablet, Take 1 tablet (30 mg total) by mouth once daily., Disp: 30 tablet, Rfl: 2    rOPINIRole (REQUIP) 1 MG tablet, Take 1 tablet (1 mg total) by mouth every evening., Disp: 30 tablet, Rfl: 0    traZODone (DESYREL) 150 MG tablet, TAKE 1 TABLET AT BEDTIME AS NEEDED FOR INSOMNIA, Disp: 30 tablet, Rfl: 2       Significant Labs: - none at this time    Significant Imaging: - none at this time    Physical Exam  Vitals and nursing note reviewed.   Constitutional:       General: She is awake.      Appearance: Normal appearance.   Musculoskeletal:      Comments: Deferred d/t telehealth   Neurological:      Mental Status: She is alert.   Psychiatric:         Attention and Perception: Attention and perception normal. She does not perceive auditory or visual hallucinations.         Mood and Affect: Affect normal.         Speech: Speech normal.         Behavior: Behavior is cooperative.         Thought Content: Thought content does not include homicidal or suicidal ideation.         Cognition and Memory: Cognition and memory normal.          Review of  "Systems     Mental Status Exam:  Presentation:  - Appearance: 62 y.o. year old White female, appears stated age, appears Casually dressed and Well groomed  - Motility: No EPS or TD s/s noted or reported  - Behavior: calm, cooperative, good eye contact  Speech:  - Character/Organization: spontaneous, fluent, normal volume, normal rate, normal rhythm  Emotional State:  - Mood: "pleasant"   - Affect: congruent and appropriate  Thought:  - Process: logical, linear, organized , goal-directed  - Preoccupations: no ruminations, rituals, or phobias appreciated  - Delusions: no persecutory, paranoid, or grandiose delusions appreciated  - Perception: denies AVH, not actively responding to internal stimuli  - SI/HI: denies/denies  Sensorium & Intellect:  - Sensorium: AAOx4  - Memory: intact to recent and remote events  - Attention/Concentration: good/good  - Insight/Judgement: good/good    Gait: deferred   MSK:deferred     All other systems without acute issues unless noted in HPI      Assessment/Plan      ICD-10-CM ICD-9-CM    1. Bipolar affective disorder, remission status unspecified  F31.9 296.80 paroxetine (PAXIL) 30 MG tablet      carBAMazepine (TEGRETOL) 200 mg tablet      2. Generalized anxiety disorder  F41.1 300.02 paroxetine (PAXIL) 30 MG tablet      3. Insomnia, unspecified type  G47.00 780.52 traZODone (DESYREL) 150 MG tablet      4. Restless legs syndrome  G25.81 333.94 rOPINIRole (REQUIP) 1 MG tablet         Continue current medications without change    Obtain carbamazepine level in November when she completes labs for PCP    Potential side effects and risks vs benefits of current treatment plan reviewed with patient. Applicable black box warnings reviewed. Encouraged patient not to alter dosages or abruptly discontinue medications without contacting prescriber first, due to risk of worsening symptoms and decompensation of mental status. Warned of risks associated with herbal remedies and supplements while " taking psychotropic medications and of the need to consult prescriber prior to adding any of these to current regimen. Patient should abstain from abuse of alcohol, prescription medications, and illicit drugs. Reviewed when to contact clinic and/or seek emergent care, such as but not limited to, onset/worsening SI/HI, hallucinations, delusions, manic symptoms. Pt verbalized understanding and agreement of these warnings/recommendations and verbally consented to treatment plan.    The patient was informed of the restrictions of face-to-face healthcare visits. The patient verbally consented to proceed with a telemedicine visit, following discussion of the options of face-to-face or telemedicine visits. The telemedicine visit was completed using Epic  Video call without complication. The visit was conducted with limited physical exam and was medically appropriate to meet the patient's needs. Duration of call: 8 minutes      Follow up in about 3 months (around 11/16/2023) for Medication Management.    Matthew Ferreira, MAYTEP

## 2023-08-22 ENCOUNTER — APPOINTMENT (OUTPATIENT)
Dept: RADIOLOGY | Facility: CLINIC | Age: 63
End: 2023-08-22
Attending: NURSE PRACTITIONER
Payer: MEDICARE

## 2023-08-22 ENCOUNTER — OFFICE VISIT (OUTPATIENT)
Dept: FAMILY MEDICINE | Facility: CLINIC | Age: 63
End: 2023-08-22
Payer: MEDICARE

## 2023-08-22 VITALS
DIASTOLIC BLOOD PRESSURE: 78 MMHG | HEART RATE: 72 BPM | HEIGHT: 59 IN | WEIGHT: 215.19 LBS | TEMPERATURE: 98 F | OXYGEN SATURATION: 94 % | BODY MASS INDEX: 43.38 KG/M2 | SYSTOLIC BLOOD PRESSURE: 130 MMHG

## 2023-08-22 DIAGNOSIS — R05.9 COUGH, UNSPECIFIED TYPE: ICD-10-CM

## 2023-08-22 DIAGNOSIS — J45.41 MODERATE PERSISTENT REACTIVE AIRWAY DISEASE WITH ACUTE EXACERBATION: Primary | ICD-10-CM

## 2023-08-22 DIAGNOSIS — R06.2 WHEEZING: ICD-10-CM

## 2023-08-22 LAB
ANION GAP SERPL CALC-SCNC: 6 MEQ/L (ref 2–13)
BASOPHILS # BLD AUTO: 0.04 X10(3)/MCL (ref 0.01–0.08)
BASOPHILS NFR BLD AUTO: 1 % (ref 0.1–1.2)
BUN SERPL-MCNC: 12 MG/DL (ref 7–20)
CALCIUM SERPL-MCNC: 9.1 MG/DL (ref 8.4–10.2)
CHLORIDE SERPL-SCNC: 108 MMOL/L (ref 98–110)
CO2 SERPL-SCNC: 24 MMOL/L (ref 21–32)
CREAT SERPL-MCNC: 0.85 MG/DL (ref 0.66–1.25)
CREAT/UREA NIT SERPL: 14 (ref 12–20)
EOSINOPHIL # BLD AUTO: 0.4 X10(3)/MCL (ref 0.04–0.36)
EOSINOPHIL NFR BLD AUTO: 9.7 % (ref 0.7–7)
ERYTHROCYTE [DISTWIDTH] IN BLOOD BY AUTOMATED COUNT: 14.6 % (ref 11–14.5)
GFR SERPLBLD CREATININE-BSD FMLA CKD-EPI: 77 MLS/MIN/1.73/M2
GLUCOSE SERPL-MCNC: 88 MG/DL (ref 70–115)
HCT VFR BLD AUTO: 38.7 % (ref 36–48)
HGB BLD-MCNC: 12.6 G/DL (ref 11.8–16)
IMM GRANULOCYTES # BLD AUTO: 0.01 X10(3)/MCL (ref 0–0.03)
IMM GRANULOCYTES NFR BLD AUTO: 0.2 % (ref 0–0.5)
LYMPHOCYTES # BLD AUTO: 1.13 X10(3)/MCL (ref 1.16–3.74)
LYMPHOCYTES NFR BLD AUTO: 27.4 % (ref 20–55)
MCH RBC QN AUTO: 30.2 PG (ref 27–34)
MCHC RBC AUTO-ENTMCNC: 32.6 G/DL (ref 31–37)
MCV RBC AUTO: 92.8 FL (ref 79–99)
MONOCYTES # BLD AUTO: 0.33 X10(3)/MCL (ref 0.24–0.36)
MONOCYTES NFR BLD AUTO: 8 % (ref 4.7–12.5)
NEUTROPHILS # BLD AUTO: 2.22 X10(3)/MCL (ref 1.56–6.13)
NEUTROPHILS NFR BLD AUTO: 53.7 % (ref 37–73)
NONINV COLON CA DNA+OCC BLD SCRN STL QL: NEGATIVE
NRBC BLD AUTO-RTO: 0 %
PLATELET # BLD AUTO: 133 X10(3)/MCL (ref 140–371)
PMV BLD AUTO: 10.7 FL (ref 9.4–12.4)
POTASSIUM SERPL-SCNC: 3.9 MMOL/L (ref 3.5–5.1)
RBC # BLD AUTO: 4.17 X10(6)/MCL (ref 4–5.1)
SODIUM SERPL-SCNC: 138 MMOL/L (ref 135–145)
WBC # SPEC AUTO: 4.13 X10(3)/MCL (ref 4–11.5)

## 2023-08-22 PROCEDURE — 1159F PR MEDICATION LIST DOCUMENTED IN MEDICAL RECORD: ICD-10-PCS | Mod: ,,, | Performed by: NURSE PRACTITIONER

## 2023-08-22 PROCEDURE — 3075F SYST BP GE 130 - 139MM HG: CPT | Mod: ,,, | Performed by: NURSE PRACTITIONER

## 2023-08-22 PROCEDURE — 3075F PR MOST RECENT SYSTOLIC BLOOD PRESS GE 130-139MM HG: ICD-10-PCS | Mod: ,,, | Performed by: NURSE PRACTITIONER

## 2023-08-22 PROCEDURE — 99214 PR OFFICE/OUTPT VISIT, EST, LEVL IV, 30-39 MIN: ICD-10-PCS | Mod: ,,, | Performed by: NURSE PRACTITIONER

## 2023-08-22 PROCEDURE — 3008F BODY MASS INDEX DOCD: CPT | Mod: ,,, | Performed by: NURSE PRACTITIONER

## 2023-08-22 PROCEDURE — 3078F DIAST BP <80 MM HG: CPT | Mod: ,,, | Performed by: NURSE PRACTITIONER

## 2023-08-22 PROCEDURE — 1160F RVW MEDS BY RX/DR IN RCRD: CPT | Mod: ,,, | Performed by: NURSE PRACTITIONER

## 2023-08-22 PROCEDURE — 71046 X-RAY EXAM CHEST 2 VIEWS: CPT | Mod: TC,RHCUB | Performed by: NURSE PRACTITIONER

## 2023-08-22 PROCEDURE — 3078F PR MOST RECENT DIASTOLIC BLOOD PRESSURE < 80 MM HG: ICD-10-PCS | Mod: ,,, | Performed by: NURSE PRACTITIONER

## 2023-08-22 PROCEDURE — 1160F PR REVIEW ALL MEDS BY PRESCRIBER/CLIN PHARMACIST DOCUMENTED: ICD-10-PCS | Mod: ,,, | Performed by: NURSE PRACTITIONER

## 2023-08-22 PROCEDURE — 3008F PR BODY MASS INDEX (BMI) DOCUMENTED: ICD-10-PCS | Mod: ,,, | Performed by: NURSE PRACTITIONER

## 2023-08-22 PROCEDURE — 99214 OFFICE O/P EST MOD 30 MIN: CPT | Mod: ,,, | Performed by: NURSE PRACTITIONER

## 2023-08-22 PROCEDURE — 1159F MED LIST DOCD IN RCRD: CPT | Mod: ,,, | Performed by: NURSE PRACTITIONER

## 2023-08-22 RX ORDER — BUDESONIDE AND FORMOTEROL FUMARATE DIHYDRATE 80; 4.5 UG/1; UG/1
2 AEROSOL RESPIRATORY (INHALATION) 2 TIMES DAILY
Qty: 10.2 G | Refills: 1 | Status: SHIPPED | OUTPATIENT
Start: 2023-08-22 | End: 2023-09-08 | Stop reason: SDUPTHER

## 2023-08-22 RX ORDER — ALBUTEROL SULFATE 0.83 MG/ML
2.5 SOLUTION RESPIRATORY (INHALATION) EVERY 6 HOURS PRN
Qty: 90 EACH | Refills: 0 | Status: SHIPPED | OUTPATIENT
Start: 2023-08-22 | End: 2024-01-04

## 2023-08-22 RX ORDER — PREDNISONE 20 MG/1
TABLET ORAL
Qty: 14 TABLET | Refills: 0 | Status: SHIPPED | OUTPATIENT
Start: 2023-08-22 | End: 2023-09-03

## 2023-08-22 NOTE — PROGRESS NOTES
"   Patient ID: [id number]     Chief Complaint: Nasal Congestion, Cough, Extremity Weakness (Symptoms started around 8/4; saw Flora Maxwell last week- abx given, "didn't help"), and concern for mold exposure      HPI:     Sophy Iyer is a 62 y.o. female in the office with c/o cough for over three weeks.  She states it started with   Sneezing, ST then cough.   Cough productive, clear or light yellow.  Has mild SOB after coughing spells.  She's had intermittent HA. No N/V/D. She reports living near a field of ragweed.  She also notes that she's had mold in her apartment twice that's been cleaned but she noticed it again 2 weeks ago.  Has not felt well enough to call and have maintenance come again and clean it.  On Bactrim, started on 8/14/23 and not yet complete but no improvement in symptoms.    Past Medical History:  has a past medical history of Anemia, Anxiety, Bipolar disorder, Calculus of kidney, Chronic sinusitis, unspecified, Depression, Fatigue, GERD (gastroesophageal reflux disease), Grief, Kidney stone, Left shoulder pain, Migraine, Mixed hyperlipidemia, Obesity, unspecified, Restless legs syndrome (RLS), Trapezius muscle spasm, and Vitamin D deficiency.    Social History:  reports that she has never smoked. She has never used smokeless tobacco. She reports that she does not drink alcohol and does not use drugs.    Current Outpatient Medications   Medication Instructions    albuterol (PROVENTIL) 2.5 mg, Nebulization, Every 6 hours PRN, Rescue    albuterol (PROVENTIL/VENTOLIN HFA) 90 mcg/actuation inhaler INHALE 2 PUFFS EVERY 4 HOURS FOR SHORTNESS OF BREATH OR WHEEZING -- SHAKE WELL BEFORE USE    atorvastatin (LIPITOR) 40 mg, Oral    budesonide-formoterol 80-4.5 mcg (SYMBICORT) 80-4.5 mcg/actuation HFAA 2 puffs, Inhalation, 2 times daily, Controller    carBAMazepine (TEGRETOL) 200 mg, Oral, 2 times daily    ergocalciferol (ERGOCALCIFEROL) 50,000 Units, Oral, Every 7 days    fluticasone propionate (FLONASE) " "50 mcg/actuation nasal spray No dose, route, or frequency recorded.    GEMTESA 75 mg Tab 1 tablet, Oral, Daily    naproxen (NAPROSYN) 500 MG tablet TAKE 1 TABLET TWICE DAILY WITH FOOD FOR PAIN / INFLAMMATION    paroxetine (PAXIL) 30 mg, Oral, Daily    predniSONE (DELTASONE) 20 MG tablet Take 2 tablets (40 mg total) by mouth once daily for 4 days, THEN 1 tablet (20 mg total) once daily for 4 days, THEN 0.5 tablets (10 mg total) once daily for 4 days.    rizatriptan (MAXALT) 10 MG tablet TAKE 1 TABLET DAILY AS NEEDED FOR HEADACHE    rOPINIRole (REQUIP) 1 mg, Oral, Nightly    topiramate (TOPAMAX) 25 mg, Oral, 2 times daily    traZODone (DESYREL) 150 MG tablet TAKE 1 TABLET AT BEDTIME AS NEEDED FOR INSOMNIA       Patient is allergic to penicillin and codeine.     Patient Care Team:  Brittany Hernandez FNP-C as PCP - General (Family Medicine)  Matthew Ferreira PMHNP as Nurse Practitioner (Psychiatry)  Han Ramírez MD as Consulting Physician (Hematology and Oncology)       Subjective     Review of Systems    See HPI     Objective     Visit Vitals  /78 (BP Location: Right arm, Patient Position: Sitting)   Pulse 72   Temp 98.2 °F (36.8 °C) (Temporal)   Ht 4' 11.02" (1.499 m)   Wt 97.6 kg (215 lb 3.2 oz)   SpO2 (!) 94%   BMI 43.44 kg/m²       Physical Exam  Vitals reviewed.   Constitutional:       Appearance: Normal appearance.   HENT:      Right Ear: Tympanic membrane normal.      Left Ear: Tympanic membrane normal.      Nose: Congestion present.      Mouth/Throat:      Mouth: Mucous membranes are moist.      Pharynx: Oropharyngeal exudate present.   Cardiovascular:      Rate and Rhythm: Normal rate and regular rhythm.   Pulmonary:      Effort: Pulmonary effort is normal.      Breath sounds: Wheezing present.   Lymphadenopathy:      Cervical: No cervical adenopathy.   Skin:     General: Skin is warm and dry.   Neurological:      Mental Status: She is alert and oriented to person, place, and time. "   Psychiatric:         Mood and Affect: Mood normal.         Behavior: Behavior normal.         Thought Content: Thought content normal.         Judgment: Judgment normal.       Assessment:       ICD-10-CM ICD-9-CM   1. Moderate persistent reactive airway disease with acute exacerbation  J45.41 493.92   2. Cough, unspecified type  R05.9 786.2   3. Wheezing  R06.2 786.07        Plan:     Chest x-ray today to rule out pneumonia   CBC, BMP   Start Symbicort and long steroid taper.   Albuterol nebulizer 3 times a day   Complete Bactrim   Notify office for any worsening of symptoms.    Follow up for Follow Up 2-3 weeks for cough. In addition to their next scheduled appointment, the patient has also been instructed to follow up on as needed basis.     Future Appointments   Date Time Provider Department Center   9/12/2023 11:30 AM Brittany Hernandez FNP-C [facility]   11/15/2023  9:00 AM LAB, [facility] LABORATORY DRAW STATION [facility] VIKTORIA [facility]   11/22/2023 10:00 AM Brittany Hernandez FNP-C [facility]   11/27/2023 10:00 AM Matthew Ferreira, PMJOSUEP [facility]

## 2023-08-23 ENCOUNTER — TELEPHONE (OUTPATIENT)
Dept: FAMILY MEDICINE | Facility: CLINIC | Age: 63
End: 2023-08-23
Payer: MEDICARE

## 2023-08-23 NOTE — TELEPHONE ENCOUNTER
----- Message from CHARLENE Cadena-AVRIL sent at 8/23/2023  2:26 PM CDT -----  Negative Cologuard. Repeat in 3 years.

## 2023-09-08 ENCOUNTER — OFFICE VISIT (OUTPATIENT)
Dept: FAMILY MEDICINE | Facility: CLINIC | Age: 63
End: 2023-09-08
Payer: MEDICARE

## 2023-09-08 VITALS
OXYGEN SATURATION: 96 % | TEMPERATURE: 98 F | WEIGHT: 219.38 LBS | HEART RATE: 62 BPM | DIASTOLIC BLOOD PRESSURE: 70 MMHG | HEIGHT: 59 IN | SYSTOLIC BLOOD PRESSURE: 122 MMHG | BODY MASS INDEX: 44.23 KG/M2

## 2023-09-08 DIAGNOSIS — R06.2 WHEEZING: ICD-10-CM

## 2023-09-08 DIAGNOSIS — J45.41 MODERATE PERSISTENT REACTIVE AIRWAY DISEASE WITH ACUTE EXACERBATION: ICD-10-CM

## 2023-09-08 DIAGNOSIS — R05.9 COUGH, UNSPECIFIED TYPE: ICD-10-CM

## 2023-09-08 PROCEDURE — 3008F BODY MASS INDEX DOCD: CPT | Mod: ,,, | Performed by: NURSE PRACTITIONER

## 2023-09-08 PROCEDURE — 3074F SYST BP LT 130 MM HG: CPT | Mod: ,,, | Performed by: NURSE PRACTITIONER

## 2023-09-08 PROCEDURE — 99214 OFFICE O/P EST MOD 30 MIN: CPT | Mod: ,,, | Performed by: NURSE PRACTITIONER

## 2023-09-08 PROCEDURE — 3078F DIAST BP <80 MM HG: CPT | Mod: ,,, | Performed by: NURSE PRACTITIONER

## 2023-09-08 RX ORDER — BUDESONIDE AND FORMOTEROL FUMARATE DIHYDRATE 80; 4.5 UG/1; UG/1
2 AEROSOL RESPIRATORY (INHALATION) 2 TIMES DAILY
Qty: 10.2 G | Refills: 1 | Status: SHIPPED | OUTPATIENT
Start: 2023-09-08 | End: 2023-10-20 | Stop reason: SDUPTHER

## 2023-09-08 NOTE — PROGRESS NOTES
Patient ID: [id number]     Chief Complaint: Cough, Back Pain, and Fatigue      HPI:     Sophy Iyer is a 63 y.o. female in the office following up on cough.  She was not able to tolerate the prednisone, made her too anxious and made her feel like she was going to have a manic episode so she stopped after just a couple days.  Albuterol made her feel the same way so she stopped.  Only taking the Symbicort.      Past Medical History:  has a past medical history of Anemia, Anxiety, Bipolar disorder, Calculus of kidney, Chronic sinusitis, unspecified, Depression, Fatigue, GERD (gastroesophageal reflux disease), Grief, Kidney stone, Left shoulder pain, Migraine, Mixed hyperlipidemia, Obesity, unspecified, Restless legs syndrome (RLS), Trapezius muscle spasm, and Vitamin D deficiency.    Social History:  reports that she has never smoked. She has never been exposed to tobacco smoke. She has never used smokeless tobacco. She reports that she does not drink alcohol and does not use drugs.    Current Outpatient Medications   Medication Instructions    albuterol (PROVENTIL) 2.5 mg, Nebulization, Every 6 hours PRN, Rescue    albuterol (PROVENTIL/VENTOLIN HFA) 90 mcg/actuation inhaler INHALE 2 PUFFS EVERY 4 HOURS FOR SHORTNESS OF BREATH OR WHEEZING -- SHAKE WELL BEFORE USE    atorvastatin (LIPITOR) 40 mg, Oral, Nightly    budesonide-formoterol 80-4.5 mcg (SYMBICORT) 80-4.5 mcg/actuation HFAA 2 puffs, Inhalation, 2 times daily, Controller    carBAMazepine (TEGRETOL) 200 mg, Oral, 2 times daily    ergocalciferol (ERGOCALCIFEROL) 50,000 Units, Oral, Every 7 days    fluticasone propionate (FLONASE) 50 mcg, Each Nostril, Daily    GEMTESA 75 mg Tab 1 tablet, Oral, Daily    naproxen (NAPROSYN) 500 MG tablet TAKE 1 TABLET TWICE DAILY WITH FOOD FOR PAIN / INFLAMMATION    paroxetine (PAXIL) 30 mg, Oral, Daily    rizatriptan (MAXALT) 10 MG tablet TAKE 1 TABLET DAILY AS NEEDED FOR HEADACHE    rOPINIRole (REQUIP) 1 mg, Oral, Nightly     "topiramate (TOPAMAX) 25 mg, Oral, 2 times daily    traZODone (DESYREL) 150 MG tablet TAKE 1 TABLET AT BEDTIME AS NEEDED FOR INSOMNIA       Patient is allergic to codeine and prednisone.     Patient Care Team:  Brittany Hernandez FNP-C as PCP - General (Family Medicine)  Matthew Ferreira PMHNP as Nurse Practitioner (Psychiatry)  Han Ramírez MD as Consulting Physician (Hematology and Oncology)  Justin Beltrán MD (Dermatology)       Subjective     Review of Systems    See HPI     Objective     Visit Vitals  /70 (BP Location: Right arm, Patient Position: Sitting, BP Method: Medium (Manual))   Pulse 62   Temp 98.2 °F (36.8 °C) (Temporal)   Ht 4' 11.02" (1.499 m)   Wt 99.5 kg (219 lb 5.7 oz)   SpO2 96%   BMI 44.27 kg/m²       Physical Exam  Vitals reviewed.   Constitutional:       Appearance: Normal appearance.   Cardiovascular:      Rate and Rhythm: Normal rate and regular rhythm.      Heart sounds: Normal heart sounds.   Pulmonary:      Effort: Pulmonary effort is normal.      Breath sounds: Wheezing present.   Skin:     General: Skin is warm and dry.      Capillary Refill: Capillary refill takes less than 2 seconds.   Neurological:      Mental Status: She is alert and oriented to person, place, and time.   Psychiatric:         Mood and Affect: Mood normal.         Assessment:       ICD-10-CM ICD-9-CM   1. Cough, unspecified type  R05.9 786.2   2. Wheezing  R06.2 786.07   3. Moderate persistent reactive airway disease with acute exacerbation  J45.41 493.92        Plan:     Start symbicort.  Continue albuterol prn  Educated on length of time it could take for cough to resolve after uri.    Follow up in about 1 month (around 10/8/2023) for Follow Up. In addition to their next scheduled appointment, the patient has also been instructed to follow up on as needed basis.     Future Appointments   Date Time Provider Department Center   1/19/2024  1:30 PM Matthew Ferreira PMHNP [facility] "   4/23/2024  1:00 PM Brittany Hernandez FNP-C [facility]   8/21/2024  8:20 AM LAB, [facility] LABORATORY DRAW STATION [facility] VIKTORIA [facility]   8/27/2024  1:30 PM Brittany Hernandez FNP-C [facility] I spent a total of 30 minutes on the day of the visit.This includes face to face time and non-face to face time preparing to see the patient (eg, review of tests), obtaining and/or reviewing separately obtained history, documenting clinical information in the electronic or other health record, independently interpreting results and communicating results to the patient/family/caregiver, or care coordinator.

## 2023-09-15 DIAGNOSIS — J32.0 MAXILLARY SINUSITIS, UNSPECIFIED CHRONICITY: Primary | ICD-10-CM

## 2023-09-15 RX ORDER — FLUTICASONE PROPIONATE 50 MCG
1 SPRAY, SUSPENSION (ML) NASAL DAILY
Qty: 16 G | Refills: 3 | Status: SHIPPED | OUTPATIENT
Start: 2023-09-15 | End: 2023-10-15

## 2023-09-25 DIAGNOSIS — G25.81 RESTLESS LEGS SYNDROME: ICD-10-CM

## 2023-09-25 RX ORDER — ROPINIROLE 1 MG/1
1 TABLET, FILM COATED ORAL NIGHTLY
Qty: 30 TABLET | Refills: 0 | Status: SHIPPED | OUTPATIENT
Start: 2023-09-25 | End: 2023-10-20 | Stop reason: SDUPTHER

## 2023-10-20 ENCOUNTER — OFFICE VISIT (OUTPATIENT)
Dept: FAMILY MEDICINE | Facility: CLINIC | Age: 63
End: 2023-10-20
Payer: MEDICARE

## 2023-10-20 ENCOUNTER — TELEPHONE (OUTPATIENT)
Dept: FAMILY MEDICINE | Facility: CLINIC | Age: 63
End: 2023-10-20

## 2023-10-20 VITALS
OXYGEN SATURATION: 97 % | HEIGHT: 59 IN | SYSTOLIC BLOOD PRESSURE: 118 MMHG | BODY MASS INDEX: 42.33 KG/M2 | HEART RATE: 64 BPM | TEMPERATURE: 97 F | WEIGHT: 210 LBS | DIASTOLIC BLOOD PRESSURE: 72 MMHG

## 2023-10-20 DIAGNOSIS — J45.41 MODERATE PERSISTENT REACTIVE AIRWAY DISEASE WITH ACUTE EXACERBATION: Primary | ICD-10-CM

## 2023-10-20 DIAGNOSIS — G25.81 RESTLESS LEGS SYNDROME: ICD-10-CM

## 2023-10-20 DIAGNOSIS — G47.00 INSOMNIA, UNSPECIFIED TYPE: ICD-10-CM

## 2023-10-20 DIAGNOSIS — R06.2 WHEEZING: ICD-10-CM

## 2023-10-20 DIAGNOSIS — R05.9 COUGH, UNSPECIFIED TYPE: Primary | ICD-10-CM

## 2023-10-20 PROCEDURE — 3078F DIAST BP <80 MM HG: CPT | Mod: ,,, | Performed by: NURSE PRACTITIONER

## 2023-10-20 PROCEDURE — 99213 OFFICE O/P EST LOW 20 MIN: CPT | Mod: ,,, | Performed by: NURSE PRACTITIONER

## 2023-10-20 PROCEDURE — 3008F BODY MASS INDEX DOCD: CPT | Mod: ,,, | Performed by: NURSE PRACTITIONER

## 2023-10-20 PROCEDURE — 3074F SYST BP LT 130 MM HG: CPT | Mod: ,,, | Performed by: NURSE PRACTITIONER

## 2023-10-20 RX ORDER — TRAZODONE HYDROCHLORIDE 150 MG/1
TABLET ORAL
Qty: 90 TABLET | Refills: 0 | Status: SHIPPED | OUTPATIENT
Start: 2023-10-20 | End: 2023-11-09 | Stop reason: SDUPTHER

## 2023-10-20 RX ORDER — ROPINIROLE 1 MG/1
1 TABLET, FILM COATED ORAL NIGHTLY
Qty: 90 TABLET | Refills: 3 | Status: SHIPPED | OUTPATIENT
Start: 2023-10-20 | End: 2023-11-27 | Stop reason: SDUPTHER

## 2023-10-20 RX ORDER — BUDESONIDE AND FORMOTEROL FUMARATE DIHYDRATE 80; 4.5 UG/1; UG/1
2 AEROSOL RESPIRATORY (INHALATION) 2 TIMES DAILY
Qty: 10.2 G | Refills: 1 | Status: SHIPPED | OUTPATIENT
Start: 2023-10-20 | End: 2024-02-01

## 2023-10-20 NOTE — PROGRESS NOTES
Patient ID: [id number]     Chief Complaint: Cough      HPI:     Sophy Iyer is a 63 y.o. female in the office following up on cough.  She's been having a cough for almost 3 months.  She's taken antibiotics and steroids. Feels SOB, wheezing at times.  No chills or fever.  No body aches.     Past Medical History:  has a past medical history of Anemia, Anxiety, Bipolar disorder, Calculus of kidney, Chronic sinusitis, unspecified, Depression, Fatigue, GERD (gastroesophageal reflux disease), Grief, Kidney stone, Left shoulder pain, Migraine, Mixed hyperlipidemia, Obesity, unspecified, Restless legs syndrome (RLS), Trapezius muscle spasm, and Vitamin D deficiency.    Social History:  reports that she has never smoked. She has never been exposed to tobacco smoke. She has never used smokeless tobacco. She reports that she does not drink alcohol and does not use drugs.    Current Outpatient Medications   Medication Instructions    albuterol (PROVENTIL) 2.5 mg, Nebulization, Every 6 hours PRN, Rescue    albuterol (PROVENTIL/VENTOLIN HFA) 90 mcg/actuation inhaler INHALE 2 PUFFS EVERY 4 HOURS FOR SHORTNESS OF BREATH OR WHEEZING -- SHAKE WELL BEFORE USE    atorvastatin (LIPITOR) 40 mg, Oral, Nightly    budesonide-formoterol 80-4.5 mcg (SYMBICORT) 80-4.5 mcg/actuation HFAA 2 puffs, Inhalation, 2 times daily, Controller    carBAMazepine (TEGRETOL) 200 mg, Oral, 2 times daily    ergocalciferol (ERGOCALCIFEROL) 50,000 Units, Oral, Every 7 days    fluticasone propionate (FLONASE) 50 mcg, Each Nostril, Daily    GEMTESA 75 mg Tab 1 tablet, Oral, Daily    naproxen (NAPROSYN) 500 MG tablet TAKE 1 TABLET TWICE DAILY WITH FOOD FOR PAIN / INFLAMMATION    paroxetine (PAXIL) 30 mg, Oral, Daily    rizatriptan (MAXALT) 10 MG tablet TAKE 1 TABLET DAILY AS NEEDED FOR HEADACHE    rOPINIRole (REQUIP) 1 mg, Oral, Nightly    topiramate (TOPAMAX) 25 mg, Oral, 2 times daily    traZODone (DESYREL) 150 MG tablet TAKE 1 TABLET AT BEDTIME AS NEEDED FOR  "INSOMNIA       Patient is allergic to codeine and prednisone.     Patient Care Team:  Brittany Hernandez FNP-C as PCP - General (Family Medicine)  Matthew Ferreira PMHNP as Nurse Practitioner (Psychiatry)  Han Ramírez MD as Consulting Physician (Hematology and Oncology)  Justin Beltrán MD (Dermatology)       Subjective     Review of Systems    See HPI     Objective     Visit Vitals  /72 (BP Location: Left arm)   Pulse 64   Temp 97.3 °F (36.3 °C) (Temporal)   Ht 4' 11.02" (1.499 m)   Wt 95.3 kg (210 lb)   SpO2 97%   BMI 42.39 kg/m²       Physical Exam  Constitutional:       Appearance: She is obese.   HENT:      Head: Normocephalic and atraumatic.      Nose: Nose normal.      Mouth/Throat:      Mouth: Mucous membranes are moist.      Pharynx: Oropharynx is clear.   Cardiovascular:      Rate and Rhythm: Normal rate and regular rhythm.   Pulmonary:      Effort: Pulmonary effort is normal.      Breath sounds: Wheezing present.   Skin:     General: Skin is warm and dry.      Capillary Refill: Capillary refill takes less than 2 seconds.   Neurological:      Mental Status: She is alert.   Psychiatric:         Mood and Affect: Mood normal.         Behavior: Behavior normal.           Assessment:       ICD-10-CM ICD-9-CM   1. Cough, unspecified type  R05.9 786.2   2. Restless legs syndrome  G25.81 333.94   3. Insomnia, unspecified type  G47.00 780.52   4. Wheezing  R06.2 786.07        Plan:     Needs PFT, CT chest   Start symbicort. Continue albuterol prn    Follow up in about 2 weeks (around 11/3/2023) for Results. In addition to their next scheduled appointment, the patient has also been instructed to follow up on as needed basis.     Future Appointments   Date Time Provider Department Center   1/19/2024  1:30 PM Matthew Ferreira PMHNP [facility]   4/23/2024  1:00 PM Brittany Hernandez FNP-C [facility] ADAM [facility]ar[facility]   8/21/2024  8:20 AM LAB, [facility] LABORATORY DRAW STATION [facility] VIKTORIA " [facility]   8/27/2024  1:30 PM [facility]

## 2023-10-23 ENCOUNTER — TELEPHONE (OUTPATIENT)
Dept: FAMILY MEDICINE | Facility: CLINIC | Age: 63
End: 2023-10-23
Payer: MEDICARE

## 2023-10-23 DIAGNOSIS — J45.41 MODERATE PERSISTENT REACTIVE AIRWAY DISEASE WITH ACUTE EXACERBATION: Primary | ICD-10-CM

## 2023-10-23 PROBLEM — R06.02 SHORTNESS OF BREATH: Status: ACTIVE | Noted: 2023-10-23

## 2023-10-23 NOTE — TELEPHONE ENCOUNTER
Pt stated her PFT was canceled via her tha.  Informed pt that it was indeed still scheduled for 10/25.  Pt verbalized understanding

## 2023-11-09 ENCOUNTER — OFFICE VISIT (OUTPATIENT)
Dept: FAMILY MEDICINE | Facility: CLINIC | Age: 63
End: 2023-11-09
Payer: MEDICARE

## 2023-11-09 ENCOUNTER — APPOINTMENT (OUTPATIENT)
Dept: RADIOLOGY | Facility: CLINIC | Age: 63
End: 2023-11-09
Attending: NURSE PRACTITIONER
Payer: MEDICARE

## 2023-11-09 VITALS
BODY MASS INDEX: 42.01 KG/M2 | HEIGHT: 59 IN | DIASTOLIC BLOOD PRESSURE: 68 MMHG | TEMPERATURE: 99 F | WEIGHT: 208.38 LBS | HEART RATE: 61 BPM | SYSTOLIC BLOOD PRESSURE: 122 MMHG | OXYGEN SATURATION: 98 %

## 2023-11-09 DIAGNOSIS — E78.5 HYPERLIPIDEMIA, UNSPECIFIED HYPERLIPIDEMIA TYPE: ICD-10-CM

## 2023-11-09 DIAGNOSIS — M25.511 ACUTE PAIN OF RIGHT SHOULDER: Primary | ICD-10-CM

## 2023-11-09 DIAGNOSIS — J32.0 MAXILLARY SINUSITIS, UNSPECIFIED CHRONICITY: ICD-10-CM

## 2023-11-09 DIAGNOSIS — R06.02 SHORTNESS OF BREATH: ICD-10-CM

## 2023-11-09 DIAGNOSIS — M62.838 MUSCLE SPASM: ICD-10-CM

## 2023-11-09 DIAGNOSIS — J45.41 MODERATE PERSISTENT REACTIVE AIRWAY DISEASE WITH ACUTE EXACERBATION: ICD-10-CM

## 2023-11-09 DIAGNOSIS — G47.00 INSOMNIA, UNSPECIFIED TYPE: ICD-10-CM

## 2023-11-09 DIAGNOSIS — M25.511 ACUTE PAIN OF RIGHT SHOULDER: ICD-10-CM

## 2023-11-09 PROCEDURE — 99214 OFFICE O/P EST MOD 30 MIN: CPT | Mod: 25,,, | Performed by: NURSE PRACTITIONER

## 2023-11-09 PROCEDURE — 3074F PR MOST RECENT SYSTOLIC BLOOD PRESSURE < 130 MM HG: ICD-10-PCS | Mod: ,,, | Performed by: NURSE PRACTITIONER

## 2023-11-09 PROCEDURE — 3078F PR MOST RECENT DIASTOLIC BLOOD PRESSURE < 80 MM HG: ICD-10-PCS | Mod: ,,, | Performed by: NURSE PRACTITIONER

## 2023-11-09 PROCEDURE — 3074F SYST BP LT 130 MM HG: CPT | Mod: ,,, | Performed by: NURSE PRACTITIONER

## 2023-11-09 PROCEDURE — 3078F DIAST BP <80 MM HG: CPT | Mod: ,,, | Performed by: NURSE PRACTITIONER

## 2023-11-09 PROCEDURE — 96372 PR INJECTION,THERAP/PROPH/DIAG2ST, IM OR SUBCUT: ICD-10-PCS | Mod: ,,, | Performed by: NURSE PRACTITIONER

## 2023-11-09 PROCEDURE — 3008F PR BODY MASS INDEX (BMI) DOCUMENTED: ICD-10-PCS | Mod: ,,, | Performed by: NURSE PRACTITIONER

## 2023-11-09 PROCEDURE — 99214 PR OFFICE/OUTPT VISIT, EST, LEVL IV, 30-39 MIN: ICD-10-PCS | Mod: 25,,, | Performed by: NURSE PRACTITIONER

## 2023-11-09 PROCEDURE — 3044F HG A1C LEVEL LT 7.0%: CPT | Mod: ,,, | Performed by: NURSE PRACTITIONER

## 2023-11-09 PROCEDURE — 3008F BODY MASS INDEX DOCD: CPT | Mod: ,,, | Performed by: NURSE PRACTITIONER

## 2023-11-09 PROCEDURE — 96372 THER/PROPH/DIAG INJ SC/IM: CPT | Mod: ,,, | Performed by: NURSE PRACTITIONER

## 2023-11-09 PROCEDURE — 3044F PR MOST RECENT HEMOGLOBIN A1C LEVEL <7.0%: ICD-10-PCS | Mod: ,,, | Performed by: NURSE PRACTITIONER

## 2023-11-09 PROCEDURE — 73030 X-RAY EXAM OF SHOULDER: CPT | Mod: TC,RHCUB,RT | Performed by: NURSE PRACTITIONER

## 2023-11-09 RX ORDER — TRAZODONE HYDROCHLORIDE 150 MG/1
TABLET ORAL
Qty: 90 TABLET | Refills: 3 | Status: SHIPPED | OUTPATIENT
Start: 2023-11-09 | End: 2023-11-27 | Stop reason: SDUPTHER

## 2023-11-09 RX ORDER — KETOROLAC TROMETHAMINE 30 MG/ML
60 INJECTION, SOLUTION INTRAMUSCULAR; INTRAVENOUS
Status: COMPLETED | OUTPATIENT
Start: 2023-11-09 | End: 2023-11-09

## 2023-11-09 RX ORDER — ATORVASTATIN CALCIUM 40 MG/1
40 TABLET, FILM COATED ORAL NIGHTLY
Qty: 90 TABLET | Refills: 3 | Status: SHIPPED | OUTPATIENT
Start: 2023-11-09

## 2023-11-09 RX ORDER — FLUTICASONE PROPIONATE 50 MCG
1 SPRAY, SUSPENSION (ML) NASAL DAILY
Qty: 16 G | Refills: 3 | Status: SHIPPED | OUTPATIENT
Start: 2023-11-09

## 2023-11-09 RX ADMIN — KETOROLAC TROMETHAMINE 60 MG: 30 INJECTION, SOLUTION INTRAMUSCULAR; INTRAVENOUS at 09:11

## 2023-11-09 NOTE — PROGRESS NOTES
Patient ID: [id number]     Chief Complaint: Follow-up (results) and Shoulder Pain (right)      HPI:     Sophy Iyer is a 63 y.o. female in the office for Follow-up (results) and Shoulder Pain (right)    Last week she moved her recliner and woke up two days later with right shoulder pain. Now has limited ROM.  Can't tolerate oral NSAIDs.      Cough resolved.  Breathing difficulty likely related to deconditioning and obesity.     Requesting refills on medications.     Past Medical History:  has a past medical history of Anemia, Anxiety, Bipolar disorder, Calculus of kidney, Chronic sinusitis, unspecified, Depression, Fatigue, GERD (gastroesophageal reflux disease), Grief, Kidney stone, Left shoulder pain, Migraine, Mixed hyperlipidemia, Obesity, unspecified, Restless legs syndrome (RLS), Trapezius muscle spasm, and Vitamin D deficiency.    Social History:  reports that she has never smoked. She has never been exposed to tobacco smoke. She has never used smokeless tobacco. She reports that she does not drink alcohol and does not use drugs.    Current Outpatient Medications   Medication Instructions    albuterol (PROVENTIL) 2.5 mg, Nebulization, Every 6 hours PRN, Rescue    albuterol (PROVENTIL/VENTOLIN HFA) 90 mcg/actuation inhaler INHALE 2 PUFFS EVERY 4 HOURS FOR SHORTNESS OF BREATH OR WHEEZING -- SHAKE WELL BEFORE USE    atorvastatin (LIPITOR) 40 mg, Oral, Nightly    budesonide-formoterol 80-4.5 mcg (SYMBICORT) 80-4.5 mcg/actuation HFAA 2 puffs, Inhalation, 2 times daily, Controller    carBAMazepine (TEGRETOL) 200 mg, Oral, 2 times daily    ergocalciferol (ERGOCALCIFEROL) 50,000 Units, Oral, Every 7 days    fluticasone propionate (FLONASE) 50 mcg, Each Nostril, Daily    GEMTESA 75 mg Tab 1 tablet, Oral, Daily    naproxen (NAPROSYN) 500 MG tablet TAKE 1 TABLET TWICE DAILY WITH FOOD FOR PAIN / INFLAMMATION    paroxetine (PAXIL) 30 mg, Oral, Daily    rizatriptan (MAXALT) 10 MG tablet TAKE 1 TABLET DAILY AS NEEDED  "FOR HEADACHE    rOPINIRole (REQUIP) 1 mg, Oral, Nightly    topiramate (TOPAMAX) 25 mg, Oral, 2 times daily    traZODone (DESYREL) 150 MG tablet TAKE 1 TABLET AT BEDTIME AS NEEDED FOR INSOMNIA       Patient is allergic to codeine and prednisone.     Patient Care Team:  Brittany Hernandez FNP-C as PCP - General (Family Medicine)  Matthew Ferreira PMHNP as Nurse Practitioner (Psychiatry)  Han Ramírez MD as Consulting Physician (Hematology and Oncology)  Justin Beltrán MD (Dermatology)       Subjective     Review of Systems    See HPI     Objective     Visit Vitals  /68 (BP Location: Left arm, Patient Position: Sitting, BP Method: Medium (Manual))   Pulse 61   Temp 98.7 °F (37.1 °C) (Oral)   Ht 4' 11.02" (1.499 m)   Wt 94.5 kg (208 lb 6.4 oz)   SpO2 98%   BMI 42.07 kg/m²       Physical Exam  Vitals reviewed.   Constitutional:       Appearance: Normal appearance.   Cardiovascular:      Rate and Rhythm: Normal rate and regular rhythm.      Heart sounds: Normal heart sounds.   Pulmonary:      Effort: Pulmonary effort is normal.      Breath sounds: Normal breath sounds.   Musculoskeletal:      Comments: + empty can, + painful arc at 90*   Skin:     General: Skin is warm and dry.   Neurological:      Mental Status: She is alert and oriented to person, place, and time.   Psychiatric:         Mood and Affect: Mood normal.       Assessment:       ICD-10-CM ICD-9-CM   1. Acute pain of right shoulder  M25.511 719.41   2. Muscle spasm  M62.838 728.85   3. Insomnia, unspecified type  G47.00 780.52   4. Shortness of breath  R06.02 786.05   5. Hyperlipidemia, unspecified hyperlipidemia type  E78.5 272.4   6. Moderate persistent reactive airway disease with acute exacerbation  J45.41 493.92   7. Maxillary sinusitis, unspecified chronicity  J32.0 473.0        Plan:     XR right shoulder, cervical spine. Will call with results   RICE, Toradol injection  Start PT if no improvement in 2 weeks.  Refills sent on "       Follow up in about 2 weeks (around 11/23/2023). In addition to their next scheduled appointment, the patient has also been instructed to follow up on as needed basis.     Future Appointments   Date Time Provider Department Center   1/19/2024  1:30 PM Matthew Ferreira [facility]OSUE[facility]   4/23/2024  1:00 PM Brittany Hernandez FNP-C [facility] AD[facility]   8/21/2024  8:20 AM LAB, [facility] LABORATORY DRAW STATION [facility] VIKTORIA [facility]   8/27/2024  1:30 PM Brittany Hernandez FNP-C [facility]

## 2023-11-27 ENCOUNTER — TELEPHONE (OUTPATIENT)
Dept: FAMILY MEDICINE | Facility: CLINIC | Age: 63
End: 2023-11-27
Payer: MEDICARE

## 2023-11-27 ENCOUNTER — OFFICE VISIT (OUTPATIENT)
Dept: BEHAVIORAL HEALTH | Facility: CLINIC | Age: 63
End: 2023-11-27
Payer: MEDICARE

## 2023-11-27 VITALS — HEIGHT: 59 IN | BODY MASS INDEX: 42 KG/M2 | WEIGHT: 208.31 LBS

## 2023-11-27 DIAGNOSIS — F31.9 BIPOLAR AFFECTIVE DISORDER, REMISSION STATUS UNSPECIFIED: Primary | ICD-10-CM

## 2023-11-27 DIAGNOSIS — G25.81 RESTLESS LEGS SYNDROME: ICD-10-CM

## 2023-11-27 DIAGNOSIS — F41.1 GENERALIZED ANXIETY DISORDER: ICD-10-CM

## 2023-11-27 DIAGNOSIS — G47.00 INSOMNIA, UNSPECIFIED TYPE: ICD-10-CM

## 2023-11-27 PROCEDURE — 3008F PR BODY MASS INDEX (BMI) DOCUMENTED: ICD-10-PCS | Mod: 95,,, | Performed by: NURSE PRACTITIONER

## 2023-11-27 PROCEDURE — 1159F PR MEDICATION LIST DOCUMENTED IN MEDICAL RECORD: ICD-10-PCS | Mod: 95,,, | Performed by: NURSE PRACTITIONER

## 2023-11-27 PROCEDURE — 3008F BODY MASS INDEX DOCD: CPT | Mod: 95,,, | Performed by: NURSE PRACTITIONER

## 2023-11-27 PROCEDURE — 1160F PR REVIEW ALL MEDS BY PRESCRIBER/CLIN PHARMACIST DOCUMENTED: ICD-10-PCS | Mod: 95,,, | Performed by: NURSE PRACTITIONER

## 2023-11-27 PROCEDURE — 1159F MED LIST DOCD IN RCRD: CPT | Mod: 95,,, | Performed by: NURSE PRACTITIONER

## 2023-11-27 PROCEDURE — 1160F RVW MEDS BY RX/DR IN RCRD: CPT | Mod: 95,,, | Performed by: NURSE PRACTITIONER

## 2023-11-27 PROCEDURE — 99214 PR OFFICE/OUTPT VISIT, EST, LEVL IV, 30-39 MIN: ICD-10-PCS | Mod: 95,,, | Performed by: NURSE PRACTITIONER

## 2023-11-27 PROCEDURE — 99214 OFFICE O/P EST MOD 30 MIN: CPT | Mod: 95,,, | Performed by: NURSE PRACTITIONER

## 2023-11-27 RX ORDER — PAROXETINE 30 MG/1
30 TABLET, FILM COATED ORAL DAILY
Qty: 30 TABLET | Refills: 2 | Status: SHIPPED | OUTPATIENT
Start: 2023-11-27 | End: 2024-01-19 | Stop reason: SDUPTHER

## 2023-11-27 RX ORDER — CARBAMAZEPINE 200 MG/1
200 TABLET ORAL 2 TIMES DAILY
Qty: 60 TABLET | Refills: 2 | Status: SHIPPED | OUTPATIENT
Start: 2023-11-27 | End: 2024-01-19 | Stop reason: SDUPTHER

## 2023-11-27 RX ORDER — TRAZODONE HYDROCHLORIDE 150 MG/1
TABLET ORAL
Qty: 90 TABLET | Refills: 3 | Status: SHIPPED | OUTPATIENT
Start: 2023-11-27 | End: 2024-01-19 | Stop reason: SDUPTHER

## 2023-11-27 RX ORDER — ROPINIROLE 1 MG/1
1 TABLET, FILM COATED ORAL NIGHTLY
Qty: 90 TABLET | Refills: 3 | Status: SHIPPED | OUTPATIENT
Start: 2023-11-27 | End: 2024-01-19 | Stop reason: SDUPTHER

## 2023-11-27 NOTE — TELEPHONE ENCOUNTER
----- Message from SOPHIE Cadena sent at 11/24/2023  8:45 AM CST -----  Please inform patient of results:    Chronic changes in the cervical spine.  Proceed with PT.  Follow after completion to eval need for MRI.

## 2023-11-27 NOTE — PROGRESS NOTES
"PSYCHIATRIC FOLLOW-UP VISIT NOTE    Chief Complaint   Patient presents with    Medication Refill     3 month F/U medication         History of Present Illness  63 y.o. year old White female with hx of bipolar, LILLY, insomnia, and RLS seen today for follow-up appointment and medication management.  Patient reports increase in depression and anxiety with the past 2 months related to the anniversary of her mother's death in October and other difficult anniversaries in the month of November.  Does feel that her mood has been improving in the past 2 weeks.  Still with some anxiety and daytime fatigue.  Reports sleep disturbance following recent time change.  She is averaging about 7 hours per night but falls asleep earlier than she would like and awakens early in the morning.  This leads to some fatigue later in the day.  Primary stressor at this time is that her niece is still not speaking to her and she was unsure why.  Has spoke with her sister but has not found any resolution.  She did have a good Thanksgiving with other family members and states her brother and sister will be visiting her for [place].  We agreed to focus on sleep hygiene and allow more time for her to just recent time change.  We will re-evaluate in 4 weeks and determine if further medication adjustments are necessary. Patient denies SI/HI. Denies hallucinations and does not appear to be responding to internal stimuli or be internally preoccupied. No manic symptoms noted. Tolerating SGA therapy without serious side effects. No NMS s/s. No EPS or TD symptoms noted. AIMS=0.        Objective:     Vitals:  Vitals:    11/27/23 0954   Weight: 94.5 kg (208 lb 5.4 oz)   Height: 4' 11.02" (1.499 m)       Wt Readings from Last 3 Encounters:   11/27/23 0954 94.5 kg (208 lb 5.4 oz)   11/09/23 0741 94.5 kg (208 lb 6.4 oz)   10/20/23 0845 95.3 kg (210 lb)         Medication:    Current Outpatient Medications:     albuterol (PROVENTIL/VENTOLIN HFA) 90 " mcg/actuation inhaler, INHALE 2 PUFFS EVERY 4 HOURS FOR SHORTNESS OF BREATH OR WHEEZING -- SHAKE WELL BEFORE USE, Disp: 18 g, Rfl: 2    atorvastatin (LIPITOR) 40 MG tablet, Take 1 tablet (40 mg total) by mouth every evening., Disp: 90 tablet, Rfl: 3    budesonide-formoterol 80-4.5 mcg (SYMBICORT) 80-4.5 mcg/actuation HFAA, Inhale 2 puffs into the lungs 2 (two) times a day. Controller, Disp: 10.2 g, Rfl: 1    carBAMazepine (TEGRETOL) 200 mg tablet, Take 1 tablet (200 mg total) by mouth 2 (two) times daily., Disp: 60 tablet, Rfl: 2    ergocalciferol (ERGOCALCIFEROL) 50,000 unit Cap, Take 1 capsule (50,000 Units total) by mouth every 7 days., Disp: 12 capsule, Rfl: 3    fluticasone propionate (FLONASE) 50 mcg/actuation nasal spray, 1 spray (50 mcg total) by Each Nostril route once daily., Disp: 16 g, Rfl: 3    GEMTESA 75 mg Tab, Take 1 tablet by mouth Daily., Disp: , Rfl:     naproxen (NAPROSYN) 500 MG tablet, TAKE 1 TABLET TWICE DAILY WITH FOOD FOR PAIN / INFLAMMATION, Disp: , Rfl:     paroxetine (PAXIL) 30 MG tablet, Take 1 tablet (30 mg total) by mouth once daily., Disp: 30 tablet, Rfl: 2    rizatriptan (MAXALT) 10 MG tablet, TAKE 1 TABLET DAILY AS NEEDED FOR HEADACHE, Disp: 30 tablet, Rfl: 5    rOPINIRole (REQUIP) 1 MG tablet, Take 1 tablet (1 mg total) by mouth every evening., Disp: 90 tablet, Rfl: 3    topiramate (TOPAMAX) 25 MG tablet, Take 1 tablet (25 mg total) by mouth 2 (two) times daily., Disp: 180 tablet, Rfl: 3    traZODone (DESYREL) 150 MG tablet, TAKE 1 TABLET AT BEDTIME AS NEEDED FOR INSOMNIA, Disp: 90 tablet, Rfl: 3    albuterol (PROVENTIL) 2.5 mg /3 mL (0.083 %) nebulizer solution, Take 3 mLs (2.5 mg total) by nebulization every 6 (six) hours as needed for Wheezing. Rescue (Patient not taking: Reported on 11/27/2023), Disp: 90 each, Rfl: 0       Significant Labs: - none at this time    Significant Imaging: - none at this time    Physical Exam  Vitals and nursing note reviewed.   Constitutional:        "General: She is awake.      Appearance: Normal appearance.   Musculoskeletal:      Comments: deferred   Neurological:      Mental Status: She is alert.   Psychiatric:         Attention and Perception: Attention and perception normal. She does not perceive auditory or visual hallucinations.         Mood and Affect: Affect normal. Mood is anxious and depressed.         Speech: Speech normal.         Behavior: Behavior is cooperative.         Thought Content: Thought content does not include homicidal or suicidal ideation.         Cognition and Memory: Cognition and memory normal.         Judgment: Judgment normal.          Review of Systems     Mental Status Exam:  Presentation:  - Appearance: 63 y.o. year old White female, appears stated age, appears Casually dressed and Well groomed  - Motility: No EPS or Tremors, No psychomotor agitation or retardation appreciated  - Behavior: calm, cooperative, good eye contact  Speech:  - Character/Organization: spontaneous, fluent, normal volume, normal rate, normal rhythm  Emotional State:  - Mood: "anxious"   - Affect: congruent and anxious  Thought:  - Process: logical, linear, organized , goal-directed  - Preoccupations: family  - Delusions: no persecutory, paranoid, or grandiose delusions appreciated  - Perception: denies AVH, not actively responding to internal stimuli  - SI/HI: denies/denies  Sensorium & Intellect:  - Sensorium: AAOx4  - Memory: intact to recent and remote events  - Attention/Concentration: good/good  - Insight/Judgement: good/good    Gait: deferred   MSK:deferred     All other systems without acute issues unless noted in HPI      Assessment/Plan      ICD-10-CM ICD-9-CM    1. Bipolar affective disorder, remission status unspecified  F31.9 296.80       2. Generalized anxiety disorder  F41.1 300.02       3. Insomnia, unspecified type  G47.00 780.52       4. Restless legs syndrome  G25.81 333.94          Continue current medications without " change    Potential side effects and risks vs benefits of current treatment plan reviewed with patient. Applicable black box warnings reviewed. Encouraged patient not to alter dosages or abruptly discontinue medications without contacting prescriber first, due to risk of worsening symptoms and decompensation of mental status. Warned of risks associated with herbal remedies and supplements while taking psychotropic medications and of the need to consult prescriber prior to adding any of these to current regimen. Patient should abstain from abuse of alcohol, prescription medications, and illicit drugs. Reviewed when to contact clinic and/or seek emergent care, such as but not limited to, onset/worsening SI/HI, hallucinations, delusions, manic symptoms. Pt verbalized understanding and agreement of these warnings/recommendations and verbally consented to treatment plan.    The patient was informed of the restrictions of face-to-face healthcare visits. The patient verbally consented to proceed with a telemedicine visit, following discussion of the options of face-to-face or telemedicine visits. The telemedicine visit was completed using EPIC Video call without complication. The visit was conducted with limited physical exam and was medically appropriate to meet the patient's needs. Duration of call: 11 minutes    Sleep hygiene should include: Regular sleep schedule, optimal sleep environment (dark room, lights out, no TV or Radio), relaxing pre-sleep ritual, avoid day time naps, no caffeine after noon, no excessive alcohol intake, no tobacco before bed time, and regular daily exercise.        Follow up in about 4 weeks (around 12/25/2023) for Medication Management.    Matthew Ferreira, JULIO

## 2023-11-29 ENCOUNTER — TELEPHONE (OUTPATIENT)
Dept: FAMILY MEDICINE | Facility: CLINIC | Age: 63
End: 2023-11-29
Payer: MEDICARE

## 2023-12-04 NOTE — TELEPHONE ENCOUNTER
Gave pt Marcia recommendation.  Pt states she understands, but will try chiropractor dt physical therapy always making pain worse.

## 2023-12-05 PROCEDURE — 85025 COMPLETE CBC W/AUTO DIFF WBC: CPT | Performed by: NURSE PRACTITIONER

## 2023-12-05 PROCEDURE — 83036 HEMOGLOBIN GLYCOSYLATED A1C: CPT | Performed by: NURSE PRACTITIONER

## 2023-12-05 PROCEDURE — 80061 LIPID PANEL: CPT | Performed by: NURSE PRACTITIONER

## 2023-12-05 PROCEDURE — 80053 COMPREHEN METABOLIC PANEL: CPT | Performed by: NURSE PRACTITIONER

## 2023-12-05 PROCEDURE — 84443 ASSAY THYROID STIM HORMONE: CPT | Performed by: NURSE PRACTITIONER

## 2023-12-20 ENCOUNTER — OFFICE VISIT (OUTPATIENT)
Dept: FAMILY MEDICINE | Facility: CLINIC | Age: 63
End: 2023-12-20
Payer: MEDICARE

## 2023-12-20 VITALS
TEMPERATURE: 97 F | BODY MASS INDEX: 41.66 KG/M2 | HEART RATE: 68 BPM | WEIGHT: 206.63 LBS | DIASTOLIC BLOOD PRESSURE: 72 MMHG | HEIGHT: 59 IN | SYSTOLIC BLOOD PRESSURE: 118 MMHG | OXYGEN SATURATION: 96 %

## 2023-12-20 DIAGNOSIS — J45.20 MILD INTERMITTENT ASTHMA WITHOUT COMPLICATION: ICD-10-CM

## 2023-12-20 DIAGNOSIS — E55.9 VITAMIN D DEFICIENCY: ICD-10-CM

## 2023-12-20 DIAGNOSIS — Z23 NEED FOR IMMUNIZATION AGAINST INFLUENZA: ICD-10-CM

## 2023-12-20 DIAGNOSIS — D69.6 THROMBOCYTOPENIA, UNSPECIFIED: Primary | ICD-10-CM

## 2023-12-20 DIAGNOSIS — E78.2 MIXED HYPERLIPIDEMIA: ICD-10-CM

## 2023-12-20 PROBLEM — J45.909 ASTHMA: Status: ACTIVE | Noted: 2023-12-20

## 2023-12-20 PROCEDURE — G0008 ADMIN INFLUENZA VIRUS VAC: HCPCS | Mod: ,,, | Performed by: NURSE PRACTITIONER

## 2023-12-20 PROCEDURE — 3078F DIAST BP <80 MM HG: CPT | Mod: ,,, | Performed by: NURSE PRACTITIONER

## 2023-12-20 PROCEDURE — 3074F SYST BP LT 130 MM HG: CPT | Mod: ,,, | Performed by: NURSE PRACTITIONER

## 2023-12-20 PROCEDURE — 3008F BODY MASS INDEX DOCD: CPT | Mod: ,,, | Performed by: NURSE PRACTITIONER

## 2023-12-20 PROCEDURE — 99213 OFFICE O/P EST LOW 20 MIN: CPT | Mod: ,,, | Performed by: NURSE PRACTITIONER

## 2023-12-20 PROCEDURE — 90686 IIV4 VACC NO PRSV 0.5 ML IM: CPT | Mod: ,,, | Performed by: NURSE PRACTITIONER

## 2023-12-20 NOTE — PROGRESS NOTES
Patient ID: [id number]     Chief Complaint: Follow-up and Results (Lab results)      HPI:     Sophy Iyer is a 63 y.o. female in the office for Follow-up and Results (Lab results)    Right shoulder pain improved w/chiropractor.  She is taking less naproxen.    Labs reviewed and discussed.      She denies any SOB, no cough.  Not taking symbicort, using albuterol.      Past Medical History:  has a past medical history of Anemia, Anxiety, Bipolar disorder, Calculus of kidney, Chronic sinusitis, unspecified, Depression, Fatigue, GERD (gastroesophageal reflux disease), Grief, Kidney stone, Left shoulder pain, Migraine, Mixed hyperlipidemia, Obesity, unspecified, Restless legs syndrome (RLS), Trapezius muscle spasm, and Vitamin D deficiency.    Social History:  reports that she has never smoked. She has never been exposed to tobacco smoke. She has never used smokeless tobacco. She reports that she does not drink alcohol and does not use drugs.    Current Outpatient Medications   Medication Instructions    albuterol (PROVENTIL) 2.5 mg /3 mL (0.083 %) nebulizer solution USE ONE AMPULE BY ORAL NEBULIZATION EVERY 6 HOURS AS NEEDED FOR wheezing    albuterol (PROVENTIL/VENTOLIN HFA) 90 mcg/actuation inhaler INHALE 2 PUFFS EVERY 4 HOURS FOR SHORTNESS OF BREATH OR WHEEZING -- SHAKE WELL BEFORE USE    atorvastatin (LIPITOR) 40 mg, Oral, Nightly    budesonide-formoterol 80-4.5 mcg (SYMBICORT) 80-4.5 mcg/actuation HFAA 2 puffs, Inhalation, 2 times daily, Controller    carBAMazepine (TEGRETOL) 200 mg, Oral, 2 times daily    ergocalciferol (ERGOCALCIFEROL) 50,000 Units, Oral, Every 7 days    fluticasone propionate (FLONASE) 50 mcg, Each Nostril, Daily    GEMTESA 75 mg Tab 1 tablet, Oral, Daily    naproxen (NAPROSYN) 500 MG tablet TAKE 1 TABLET TWICE DAILY WITH FOOD FOR PAIN / INFLAMMATION    paroxetine (PAXIL) 30 mg, Oral, Daily    rizatriptan (MAXALT) 10 MG tablet TAKE 1 TABLET DAILY AS NEEDED FOR HEADACHE    rOPINIRole (REQUIP) 1  "mg, Oral, Nightly    topiramate (TOPAMAX) 25 mg, Oral, 2 times daily    traZODone (DESYREL) 150 MG tablet TAKE 1 TABLET AT BEDTIME AS NEEDED FOR INSOMNIA       Patient is allergic to penicillins, codeine, and prednisone.     Patient Care Team:  Brittany Hernandez FNP-C as PCP - General (Family Medicine)  Matthew Ferreira PMHNP as Nurse Practitioner (Psychiatry)  Han Ramírez MD as Consulting Physician (Hematology and Oncology)  Justin Beltrán MD (Dermatology)     Subjective     Review of Systems    See HPI     Objective     Visit Vitals  /72 (BP Location: Right arm, Patient Position: Sitting, BP Method: Medium (Manual))   Pulse 68   Temp 96.8 °F (36 °C) (Temporal)   Ht 4' 11.02" (1.499 m)   Wt 93.7 kg (206 lb 9.6 oz)   SpO2 96%   BMI 41.71 kg/m²       Physical Exam  Vitals reviewed.   Constitutional:       Appearance: Normal appearance. She is obese.   Cardiovascular:      Rate and Rhythm: Normal rate and regular rhythm.   Pulmonary:      Effort: Pulmonary effort is normal.      Breath sounds: Normal breath sounds.   Lymphadenopathy:      Cervical: No cervical adenopathy.   Skin:     General: Skin is warm and dry.   Neurological:      Mental Status: She is alert and oriented to person, place, and time.   Psychiatric:         Mood and Affect: Mood normal.         Behavior: Behavior normal.             Assessment & Plan:     1. Thrombocytopenia, unspecified  Overview:  Worked up by heme/onc.  She notes that it is being monitored.     Assessment & Plan:  Plts 133, now 103.        2. Mixed hyperlipidemia  Overview:  On atorvastatin 40 mg    Assessment & Plan:  Continue atorvastatin 40mg       3. Vitamin D deficiency  Overview:  On vitamin D weekly    Assessment & Plan:  Level increased since last check, remains low.  Continue vit D weekly.      4. Mild intermittent asthma without complication  Overview:  Using albuterol about twice a week. Has Symbicort but not using.    Assessment & Plan:  Restart " Symbicort BID, continue albuterol hfa      5. Need for immunization against influenza  -     Influenza - Quadrivalent *Preferred* (6 months+) (PF)       Follow up for 1), 4 mo f/u, Routine, 2) 8 months wellness w/labs prior. In addition to their next scheduled appointment, the patient has also been instructed to follow up on as needed basis.     Future Appointments   Date Time Provider Department Center   4/19/2024  9:00 AM Matthew Ferreira PMHNP [facility]   4/23/2024  1:00 PM Brittany Hernandez FNP-C [facility]   8/21/2024  8:20 AM LAB [facility] LABORATORY DRAW STATION [facility] VIKTORIA [facility]   8/27/2024  1:30 PM Brittany Hernandez FNP-C [facility] AD[facility]

## 2024-01-04 DIAGNOSIS — R06.2 WHEEZING: ICD-10-CM

## 2024-01-04 DIAGNOSIS — J45.41 MODERATE PERSISTENT REACTIVE AIRWAY DISEASE WITH ACUTE EXACERBATION: ICD-10-CM

## 2024-01-04 DIAGNOSIS — R05.9 COUGH, UNSPECIFIED TYPE: ICD-10-CM

## 2024-01-04 RX ORDER — ALBUTEROL SULFATE 0.83 MG/ML
SOLUTION RESPIRATORY (INHALATION)
Qty: 3 ML | Refills: 3 | Status: SHIPPED | OUTPATIENT
Start: 2024-01-04

## 2024-01-18 ENCOUNTER — HOSPITAL ENCOUNTER (EMERGENCY)
Facility: HOSPITAL | Age: 64
Discharge: HOME | End: 2024-01-19
Attending: FAMILY MEDICINE
Payer: MEDICARE

## 2024-01-18 DIAGNOSIS — R05.9 COUGH: Primary | ICD-10-CM

## 2024-01-18 LAB
ALBUMIN SERPL-MCNC: 3.8 G/DL (ref 3.4–5)
ALBUMIN/GLOB SERPL: 1.4 RATIO
ALP SERPL-CCNC: 98 UNIT/L (ref 50–144)
ALT SERPL-CCNC: 33 UNIT/L (ref 1–45)
ANION GAP SERPL CALC-SCNC: 4 MEQ/L (ref 2–13)
AST SERPL-CCNC: 59 UNIT/L (ref 14–36)
BASOPHILS # BLD AUTO: 0.03 X10(3)/MCL (ref 0.01–0.08)
BASOPHILS NFR BLD AUTO: 0.9 % (ref 0.1–1.2)
BILIRUB SERPL-MCNC: 0.7 MG/DL (ref 0–1)
BUN SERPL-MCNC: 13 MG/DL (ref 7–20)
CALCIUM SERPL-MCNC: 9.1 MG/DL (ref 8.4–10.2)
CHLORIDE SERPL-SCNC: 109 MMOL/L (ref 98–110)
CO2 SERPL-SCNC: 27 MMOL/L (ref 21–32)
CREAT SERPL-MCNC: 0.65 MG/DL (ref 0.66–1.25)
CREAT/UREA NIT SERPL: 20 (ref 12–20)
EOSINOPHIL # BLD AUTO: 0.22 X10(3)/MCL (ref 0.04–0.36)
EOSINOPHIL NFR BLD AUTO: 6.3 % (ref 0.7–7)
ERYTHROCYTE [DISTWIDTH] IN BLOOD BY AUTOMATED COUNT: 13.8 % (ref 11–14.5)
GFR SERPLBLD CREATININE-BSD FMLA CKD-EPI: >90 MLS/MIN/1.73/M2
GLOBULIN SER-MCNC: 2.7 GM/DL (ref 2–3.9)
GLUCOSE SERPL-MCNC: 98 MG/DL (ref 70–115)
HCT VFR BLD AUTO: 36 % (ref 36–48)
HGB BLD-MCNC: 11.9 G/DL (ref 11.8–16)
IMM GRANULOCYTES # BLD AUTO: 0 X10(3)/MCL (ref 0–0.03)
IMM GRANULOCYTES NFR BLD AUTO: 0 % (ref 0–0.5)
LYMPHOCYTES # BLD AUTO: 1.35 X10(3)/MCL (ref 1.16–3.74)
LYMPHOCYTES NFR BLD AUTO: 38.4 % (ref 20–55)
MAGNESIUM SERPL-MCNC: 2 MG/DL (ref 1.8–2.4)
MCH RBC QN AUTO: 30.6 PG (ref 27–34)
MCHC RBC AUTO-ENTMCNC: 33.1 G/DL (ref 31–37)
MCV RBC AUTO: 92.5 FL (ref 79–99)
MONOCYTES # BLD AUTO: 0.31 X10(3)/MCL (ref 0.24–0.36)
MONOCYTES NFR BLD AUTO: 8.8 % (ref 4.7–12.5)
NEUTROPHILS # BLD AUTO: 1.61 X10(3)/MCL (ref 1.56–6.13)
NEUTROPHILS NFR BLD AUTO: 45.6 % (ref 37–73)
NRBC BLD AUTO-RTO: 0 %
PLATELET # BLD AUTO: 97 X10(3)/MCL (ref 140–371)
PMV BLD AUTO: 10.1 FL (ref 9.4–12.4)
POTASSIUM SERPL-SCNC: 3.6 MMOL/L (ref 3.5–5.1)
PROT SERPL-MCNC: 6.5 GM/DL (ref 6.3–8.2)
RBC # BLD AUTO: 3.89 X10(6)/MCL (ref 4–5.1)
SODIUM SERPL-SCNC: 140 MMOL/L (ref 135–145)
TROPONIN I SERPL-MCNC: <0.012 NG/ML (ref 0–0.03)
WBC # SPEC AUTO: 3.52 X10(3)/MCL (ref 4–11.5)

## 2024-01-18 PROCEDURE — 84484 ASSAY OF TROPONIN QUANT: CPT | Performed by: FAMILY MEDICINE

## 2024-01-18 PROCEDURE — 85025 COMPLETE CBC W/AUTO DIFF WBC: CPT | Performed by: FAMILY MEDICINE

## 2024-01-18 PROCEDURE — 80053 COMPREHEN METABOLIC PANEL: CPT | Performed by: FAMILY MEDICINE

## 2024-01-18 PROCEDURE — 99285 EMERGENCY DEPT VISIT HI MDM: CPT | Mod: 25

## 2024-01-18 PROCEDURE — 83735 ASSAY OF MAGNESIUM: CPT | Performed by: FAMILY MEDICINE

## 2024-01-18 PROCEDURE — 96360 HYDRATION IV INFUSION INIT: CPT

## 2024-01-18 PROCEDURE — 25000003 PHARM REV CODE 250: Performed by: FAMILY MEDICINE

## 2024-01-18 RX ADMIN — SODIUM CHLORIDE 1000 ML: 9 INJECTION, SOLUTION INTRAVENOUS at 11:01

## 2024-01-19 ENCOUNTER — OFFICE VISIT (OUTPATIENT)
Dept: BEHAVIORAL HEALTH | Facility: CLINIC | Age: 64
End: 2024-01-19
Payer: MEDICARE

## 2024-01-19 ENCOUNTER — TELEPHONE (OUTPATIENT)
Dept: FAMILY MEDICINE | Facility: CLINIC | Age: 64
End: 2024-01-19
Payer: MEDICARE

## 2024-01-19 VITALS — WEIGHT: 202.81 LBS | HEIGHT: 59 IN | BODY MASS INDEX: 40.88 KG/M2

## 2024-01-19 VITALS
TEMPERATURE: 99 F | HEIGHT: 59 IN | HEART RATE: 66 BPM | RESPIRATION RATE: 16 BRPM | OXYGEN SATURATION: 98 % | SYSTOLIC BLOOD PRESSURE: 165 MMHG | BODY MASS INDEX: 40.88 KG/M2 | WEIGHT: 202.81 LBS | DIASTOLIC BLOOD PRESSURE: 83 MMHG

## 2024-01-19 DIAGNOSIS — F31.9 BIPOLAR AFFECTIVE DISORDER, REMISSION STATUS UNSPECIFIED: Primary | ICD-10-CM

## 2024-01-19 DIAGNOSIS — G25.81 RESTLESS LEGS SYNDROME: ICD-10-CM

## 2024-01-19 DIAGNOSIS — F41.1 GENERALIZED ANXIETY DISORDER: ICD-10-CM

## 2024-01-19 DIAGNOSIS — G47.00 INSOMNIA, UNSPECIFIED TYPE: ICD-10-CM

## 2024-01-19 PROCEDURE — 90833 PSYTX W PT W E/M 30 MIN: CPT | Mod: 95,,, | Performed by: NURSE PRACTITIONER

## 2024-01-19 PROCEDURE — 1159F MED LIST DOCD IN RCRD: CPT | Mod: 95,,, | Performed by: NURSE PRACTITIONER

## 2024-01-19 PROCEDURE — 3008F BODY MASS INDEX DOCD: CPT | Mod: 95,,, | Performed by: NURSE PRACTITIONER

## 2024-01-19 PROCEDURE — 1160F RVW MEDS BY RX/DR IN RCRD: CPT | Mod: 95,,, | Performed by: NURSE PRACTITIONER

## 2024-01-19 PROCEDURE — 99214 OFFICE O/P EST MOD 30 MIN: CPT | Mod: 95,,, | Performed by: NURSE PRACTITIONER

## 2024-01-19 RX ORDER — CARBAMAZEPINE 200 MG/1
200 TABLET ORAL 2 TIMES DAILY
Qty: 60 TABLET | Refills: 2 | Status: SHIPPED | OUTPATIENT
Start: 2024-01-19 | End: 2024-03-07 | Stop reason: SDUPTHER

## 2024-01-19 RX ORDER — ROPINIROLE 1 MG/1
1 TABLET, FILM COATED ORAL NIGHTLY
Qty: 90 TABLET | Refills: 2 | Status: SHIPPED | OUTPATIENT
Start: 2024-01-19 | End: 2024-03-07 | Stop reason: SDUPTHER

## 2024-01-19 RX ORDER — TRAZODONE HYDROCHLORIDE 150 MG/1
TABLET ORAL
Qty: 90 TABLET | Refills: 2 | Status: SHIPPED | OUTPATIENT
Start: 2024-01-19 | End: 2024-03-07 | Stop reason: SDUPTHER

## 2024-01-19 RX ORDER — PAROXETINE 30 MG/1
30 TABLET, FILM COATED ORAL DAILY
Qty: 30 TABLET | Refills: 2 | Status: SHIPPED | OUTPATIENT
Start: 2024-01-19 | End: 2024-03-07 | Stop reason: SDUPTHER
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
## 2024-01-25 ENCOUNTER — TELEPHONE (OUTPATIENT)
Dept: FAMILY MEDICINE | Facility: CLINIC | Age: 64
End: 2024-01-25
Payer: MEDICARE

## 2024-02-01 ENCOUNTER — PATIENT MESSAGE (OUTPATIENT)
Dept: FAMILY MEDICINE | Facility: CLINIC | Age: 64
End: 2024-02-01

## 2024-02-01 ENCOUNTER — OFFICE VISIT (OUTPATIENT)
Dept: FAMILY MEDICINE | Facility: CLINIC | Age: 64
End: 2024-02-01
Payer: MEDICARE

## 2024-02-01 VITALS — HEIGHT: 59 IN | BODY MASS INDEX: 40.72 KG/M2 | WEIGHT: 202 LBS

## 2024-02-01 DIAGNOSIS — R53.83 FATIGUE, UNSPECIFIED TYPE: ICD-10-CM

## 2024-02-01 DIAGNOSIS — B35.4 TINEA CORPORIS: ICD-10-CM

## 2024-02-01 DIAGNOSIS — D69.6 THROMBOCYTOPENIA, UNSPECIFIED: Primary | ICD-10-CM

## 2024-02-01 DIAGNOSIS — R55 NEAR SYNCOPE: ICD-10-CM

## 2024-02-01 DIAGNOSIS — R03.0 ELEVATED BLOOD PRESSURE READING: ICD-10-CM

## 2024-02-01 PROCEDURE — 1159F MED LIST DOCD IN RCRD: CPT | Mod: 95,,, | Performed by: NURSE PRACTITIONER

## 2024-02-01 PROCEDURE — 3008F BODY MASS INDEX DOCD: CPT | Mod: 95,,, | Performed by: NURSE PRACTITIONER

## 2024-02-01 PROCEDURE — 1160F RVW MEDS BY RX/DR IN RCRD: CPT | Mod: 95,,, | Performed by: NURSE PRACTITIONER

## 2024-02-01 PROCEDURE — 99214 OFFICE O/P EST MOD 30 MIN: CPT | Mod: 95,,, | Performed by: NURSE PRACTITIONER

## 2024-02-01 RX ORDER — KETOCONAZOLE 20 MG/G
CREAM TOPICAL DAILY
Qty: 60 G | Refills: 3 | Status: SHIPPED | OUTPATIENT
Start: 2024-02-01
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
## 2024-02-01 NOTE — ASSESSMENT & PLAN NOTE
Instructed to check blood pressure everyday at the same time and keep a log for 1 week.  Submit the log for review via [employer]

## 2024-02-01 NOTE — ASSESSMENT & PLAN NOTE
Referring to new cardiologist for workup since previous cardiologist no longer accepts her insurance.

## 2024-02-01 NOTE — ASSESSMENT & PLAN NOTE
Worsening thrombocytopenia, instructed her to call and schedule an appointment with Hematology since she is already established.

## 2024-02-14 DIAGNOSIS — D70.9 NEUTROPENIA, UNSPECIFIED TYPE: Primary | ICD-10-CM

## 2024-02-15 ENCOUNTER — PATIENT MESSAGE (OUTPATIENT)
Dept: FAMILY MEDICINE | Facility: CLINIC | Age: 64
End: 2024-02-15
Payer: MEDICARE

## 2024-02-15 DIAGNOSIS — Z12.31 SCREENING MAMMOGRAM FOR BREAST CANCER: Primary | ICD-10-CM

## 2024-02-16 ENCOUNTER — PATIENT MESSAGE (OUTPATIENT)
Dept: FAMILY MEDICINE | Facility: CLINIC | Age: 64
End: 2024-02-16
Payer: MEDICARE

## 2024-02-16 ENCOUNTER — TELEPHONE (OUTPATIENT)
Dept: HEMATOLOGY/ONCOLOGY | Facility: CLINIC | Age: 64
End: 2024-02-16

## 2024-02-16 NOTE — TELEPHONE ENCOUNTER
Patient called and said she can come in a hour early to do labs due to transportation issues.      ----- Message from Digna Celis sent at 2/16/2024 10:37 AM CST -----  Contact: pt  Pt calling about appt to verify time.  Would like to know if she can come about an hour before appt because someone is bringing her.  Pt can be reached at [phone]    Thanks,

## 2024-02-19 ENCOUNTER — OFFICE VISIT (OUTPATIENT)
Dept: HEMATOLOGY/ONCOLOGY | Facility: CLINIC | Age: 64
End: 2024-02-19
Payer: MEDICARE

## 2024-02-19 VITALS
BODY MASS INDEX: 38.36 KG/M2 | WEIGHT: 190.31 LBS | HEART RATE: 62 BPM | DIASTOLIC BLOOD PRESSURE: 81 MMHG | SYSTOLIC BLOOD PRESSURE: 131 MMHG | RESPIRATION RATE: 18 BRPM | HEIGHT: 59 IN | OXYGEN SATURATION: 98 %

## 2024-02-19 DIAGNOSIS — E66.01 CLASS 3 OBESITY: ICD-10-CM

## 2024-02-19 DIAGNOSIS — D69.6 THROMBOCYTOPENIA, UNSPECIFIED: ICD-10-CM

## 2024-02-19 DIAGNOSIS — D70.9 NEUTROPENIA, UNSPECIFIED TYPE: Primary | ICD-10-CM

## 2024-02-19 LAB
BANDS: 2 % (ref 0–5)
CELLS COUNTED: 100
EOSINOPHIL NFR BLD: 4 % (ref 1–3)
ERYTHROCYTE [DISTWIDTH] IN BLOOD BY AUTOMATED COUNT: 13.5 % (ref 12.5–18)
HCT VFR BLD AUTO: 38.3 % (ref 37–47)
HGB BLD-MCNC: 12.3 G/DL (ref 12–16)
LYMPHOCYTES NFR BLD: 30 % (ref 25–40)
MCH RBC QN AUTO: 30.3 PG (ref 27–31.2)
MCHC RBC AUTO-ENTMCNC: 32.1 G/DL (ref 31.8–35.4)
MCV RBC AUTO: 94.3 FL (ref 80–97)
MONOCYTES NFR BLD: 6 % (ref 1–15)
NEUTROPHILS # BLD AUTO: 1.9 10*3/UL (ref 1.8–7.7)
NEUTROPHILS NFR BLD: 58 % (ref 37–80)
NUCLEATED RED BLOOD CELLS: 0 %
PLATELETS: 101 10*3/UL (ref 142–424)
RBC # BLD AUTO: 4.06 10*6/UL (ref 4.2–5.4)
RBC MORPH BLD: ABNORMAL
SMALL PLATELETS BLD QL SMEAR: ADEQUATE
WBC # BLD: 3.1 10*3/UL (ref 4.6–10.2)

## 2024-02-19 PROCEDURE — 99214 OFFICE O/P EST MOD 30 MIN: CPT | Mod: S$GLB,,, | Performed by: INTERNAL MEDICINE

## 2024-02-19 PROCEDURE — 1159F MED LIST DOCD IN RCRD: CPT | Mod: CPTII,S$GLB,, | Performed by: INTERNAL MEDICINE

## 2024-02-19 NOTE — PROGRESS NOTES
HEMATOLOGY FOLLOW UP CONSULTATION NOTE    Patient ID: Sophy Iyer is a 63 y.o. female.    Chief Complaint:  Leukopenia, chronic thrombocytopenia    HPI:  Patient is 62-year-old female with past medical history of migraines, hyperlipidemia, bipolar disorder, anxiety, restless leg syndrome, chronic sinusitis, gastroesophageal reflux disease, obesity, irritable bowel syndrome who is referred by her PCP for evaluation of leukopenia which per PCP is trending down over the last 1 year.  She has been on carbamazepine with a trial to wean her off.  She denies any weight loss, night sweats and presents to our clinic today for further evaluation.      1. PERIPHERAL BLOOD, FLOW CYTOMETRY:      - NO IMMUNOPHENOTYPICALLY ABNORMAL CELL POPULATIONS IDENTIFIED.   Comment:   There is no evidence of B-cell light chain restriction nor of an   abnormal T-cell immunophenotype.  No discrete blast population is   identified. Correlation with clinical information, CBC indices, and other   laboratory data is recommended.     2.  PERIPHERAL SMEAR, PERIPHERAL SMEAR REVIEW:      - LEUKOPENIA WITH ABSOLUTE NEUTROPENIA (MINIMAL).      - RED BLOOD CELLS DEMONSTRATE NORMOCHROMIC, NORMOCYTIC ANEMIA.      - THROMBOCYTOPENIA, SEE COMMENT.   Comment:  There is a pancytopenia with very minimal neutropenia, minimal   normochromic, normocytic anemia and thrombocytopenia.  Platelets are   decreased in number with normal morphology.  No clumping or satellitosis   seen.  No circulating blasts or atypical lymphocytes are seen.  No   significant anisopoikilocytosis noted; however, rare teardrop and   elliptocytes seen.  Correlate clinically and with flow cytometry study.            Past Medical History:   Diagnosis Date    Anemia     Anxiety     Bipolar disorder     Calculus of kidney 2/22/2023    Chronic sinusitis, unspecified     Depression     Fatigue     GERD (gastroesophageal reflux disease)     Grief     Kidney stone     Left shoulder pain      Migraine     Mixed hyperlipidemia     Obesity, unspecified     Restless legs syndrome (RLS)     Trapezius muscle spasm     Vitamin D deficiency        Family History   Problem Relation Age of Onset    Hypothyroidism Mother     Parkinsonism Mother     Bipolar disorder Father     Depression Father     Coronary artery disease Father     Hypertension Father     Hypothyroidism Father     Heart disease Father     Hypertension Sister     Hypothyroidism Sister     No Known Problems Brother     No Known Problems Maternal Aunt     No Known Problems Paternal Aunt     No Known Problems Maternal Uncle     No Known Problems Paternal Uncle     No Known Problems Maternal Grandfather     No Known Problems Maternal Grandmother     Diabetes Paternal Grandfather     Bipolar disorder Paternal Grandmother     Depression Paternal Grandmother     No Known Problems Cousin     No Known Problems Other        Social History     Socioeconomic History    Marital status: Single   Tobacco Use    Smoking status: Never     Passive exposure: Never    Smokeless tobacco: Never   Substance and Sexual Activity    Alcohol use: Never    Drug use: Never    Sexual activity: Not Currently     Partners: Male     Comment: Birth control pills     Social Determinants of Health     Financial Resource Strain: Low Risk  (12/13/2023)    Overall Financial Resource Strain (CARDIA)     Difficulty of Paying Living Expenses: Not very hard   Food Insecurity: No Food Insecurity (12/13/2023)    Hunger Vital Sign     Worried About Running Out of Food in the Last Year: Never true     Ran Out of Food in the Last Year: Never true   Transportation Needs: Unmet Transportation Needs (12/13/2023)    PRAPARE - Transportation     Lack of Transportation (Medical): Yes     Lack of Transportation (Non-Medical): Yes   Physical Activity: Insufficiently Active (12/13/2023)    Exercise Vital Sign     Days of Exercise per Week: 2 days     Minutes of Exercise per Session: 10 min   Stress: No  Stress Concern Present (12/13/2023)    [employer] - Occupational Stress Questionnaire     Feeling of Stress : Only a little   Social Connections: Unknown (12/13/2023)    Social Connection and Isolation Panel [NHANES]     Frequency of Communication with Friends and Family: Three times a week     Frequency of Social Gatherings with Friends and Family: Twice a week     Active Member of Clubs or Organizations: Yes     Attends Club or Organization Meetings: More than 4 times per year     Marital Status: Never    Housing Stability: High Risk (12/13/2023)    Housing Stability Vital Sign     Unable to Pay for Housing in the Last Year: Yes     Number of Places Lived in the Last Year: 1     Unstable Housing in the Last Year: No         Past Surgical History:   Procedure Laterality Date    CARPAL TUNNEL RELEASE      CHOLECYSTECTOMY      COLON BIOPSY      COLON SURGERY      HYSTERECTOMY               Review of systems:  Review of Systems   Constitutional:  Positive for activity change and fatigue. Negative for appetite change, chills, diaphoresis and unexpected weight change.   HENT:  Negative for congestion, facial swelling, hearing loss, mouth sores, trouble swallowing and voice change.    Eyes:  Negative for photophobia, pain, discharge and itching.   Respiratory:  Negative for apnea, cough, choking, chest tightness and shortness of breath.    Cardiovascular:  Negative for chest pain, palpitations and leg swelling.   Gastrointestinal:  Negative for abdominal distention, abdominal pain, anal bleeding and blood in stool.   Endocrine: Negative for cold intolerance, heat intolerance, polydipsia and polyphagia.   Genitourinary:  Negative for difficulty urinating, dysuria, flank pain and hematuria.   Musculoskeletal:  Negative for arthralgias, back pain, joint swelling, myalgias, neck pain and neck stiffness.   Skin:  Negative for color change, pallor and wound.   Allergic/Immunologic: Negative  for environmental allergies, food allergies and immunocompromised state.   Neurological:  Negative for dizziness, seizures, facial asymmetry, speech difficulty, light-headedness, numbness and headaches.   Hematological:  Negative for adenopathy. Does not bruise/bleed easily.   Psychiatric/Behavioral:  Negative for agitation, behavioral problems, confusion, decreased concentration and sleep disturbance.                                        Physical Exam  Vitals and nursing note reviewed.   Constitutional:       General: She is not in acute distress.     Appearance: Normal appearance. She is not ill-appearing.   HENT:      Head: Normocephalic and atraumatic.      Nose: No congestion or rhinorrhea.   Eyes:      General: No scleral icterus.     Extraocular Movements: Extraocular movements intact.      Pupils: Pupils are equal, round, and reactive to light.   Cardiovascular:      Rate and Rhythm: Normal rate and regular rhythm.      Pulses: Normal pulses.      Heart sounds: Normal heart sounds. No murmur heard.     No gallop.   Pulmonary:      Effort: Pulmonary effort is normal. No respiratory distress.      Breath sounds: Normal breath sounds. No stridor. No wheezing or rhonchi.   Abdominal:      General: Bowel sounds are normal. There is no distension.      Palpations: There is no mass.      Tenderness: There is no abdominal tenderness. There is no guarding.   Musculoskeletal:         General: No swelling, tenderness, deformity or signs of injury. Normal range of motion.      Cervical back: Normal range of motion and neck supple. No rigidity. No muscular tenderness.      Right lower leg: No edema.      Left lower leg: No edema.   Skin:     General: Skin is warm.      Coloration: Skin is not jaundiced or pale.      Findings: No bruising or lesion.   Neurological:      General: No focal deficit present.      Mental Status: She is alert and oriented to person, place, and time.      Cranial Nerves: No cranial nerve  deficit.      Sensory: No sensory deficit.      Motor: No weakness.      Gait: Gait normal.   Psychiatric:         Mood and Affect: Mood normal.         Behavior: Behavior normal.         Thought Content: Thought content normal.       There were no vitals filed for this visit.    There is no height or weight on file to calculate BSA.    Assessment/Plan:        Chronic Thrombocytopenia:    ==  2/19/24:  Patient returns to clinic after almost a year.  Previously evaluated for mild leukopenia which was deemed to be reactive.  She was now referred for thrombocytopenia.  I reviewed her platelet counts over the last 2 years and she is noted to have chronic thrombocytopenia noted even on her labs done in September of 2021.  More recently she is noted to have a slight decline with last platelet count 1 month back at 97 K. I discussed with her various possible etiologies for chronic thrombocytopenia and no indication for intervention at this time considering this is a chronic benign process.  No indication for bone marrow biopsy.  Will continue with observation only at this time.  If drop in platelet counts are noted in the future consideration of bone marrow biopsy can be made but not indicated at this time          2. Leukopenia:    == I do not have previous labs over the last 1 year for comparison but based on primary care provider notes her leukopenia has been worsening over the last 1 year.  == Recent labs done few months back reviewed, where she was noted to have mild leukopenia.  Normal differential.  Mildly decreased platelet count.  Normal hemoglobin hematocrit.  She was also noted to have mildly increased TSH which could be causing mild leukopenia.  == Peripheral blood smear revealed leukopenia with an absolute neutropenia along with mild thrombocytopenia but no other significant abnormality.  I discussed with her several etiologies of leukopenia including both primary versus secondary bone marrow etiologies. I  believe in her case carbamazepine could be likely contributing, also she has mildly increased TSH levels.  Nonetheless for the sake of completion I will obtain flow cytometry and if any abnormalities noted on this will obtain a bone marrow biopsy.  If no abnormalities noted on flow then I would continue with observation only.  == 2/9/23:  Peripheral blood flow cytometry revealed no immunophenotypically abnormal cell population.  Will hence continue with observation only at this time. Cause of leukopenia likely reactive.      Plan:  Return to clinic in 5 months  for MD visit with CBC prior    Pt is instructed to RTC with labs for continued monitoring of treatment as instructed.     Total time spent in counseling and discussion about further management options including relevant lab work, treatment,  prognosis, medications and intended side effects was more than 25 minutes. More than 50 % of the time was spent in counseling and coordination of care.  This includes face to face time and non-face to face time preparing to see the patient (eg, review of tests), Obtaining and/or reviewing separately obtained history, Documenting clinical information in the electronic or other health record, Independently interpreting resultsand communicating results to the patient/family/caregiver, or Care coordination.

## 2024-02-22 ENCOUNTER — TELEPHONE (OUTPATIENT)
Dept: FAMILY MEDICINE | Facility: CLINIC | Age: 64
End: 2024-02-22
Payer: MEDICARE

## 2024-02-24 ENCOUNTER — PATIENT MESSAGE (OUTPATIENT)
Dept: FAMILY MEDICINE | Facility: CLINIC | Age: 64
End: 2024-02-24
Payer: MEDICARE

## 2024-02-27 ENCOUNTER — PATIENT MESSAGE (OUTPATIENT)
Dept: BEHAVIORAL HEALTH | Facility: CLINIC | Age: 64
End: 2024-02-27
Payer: MEDICARE

## 2024-03-07 ENCOUNTER — OFFICE VISIT (OUTPATIENT)
Dept: BEHAVIORAL HEALTH | Facility: CLINIC | Age: 64
End: 2024-03-07
Payer: MEDICARE

## 2024-03-07 VITALS — BODY MASS INDEX: 38.36 KG/M2 | WEIGHT: 190.25 LBS | HEIGHT: 59 IN

## 2024-03-07 DIAGNOSIS — G25.81 RESTLESS LEGS SYNDROME: ICD-10-CM

## 2024-03-07 DIAGNOSIS — F31.9 BIPOLAR AFFECTIVE DISORDER, REMISSION STATUS UNSPECIFIED: Primary | ICD-10-CM

## 2024-03-07 DIAGNOSIS — G47.00 INSOMNIA, UNSPECIFIED TYPE: ICD-10-CM

## 2024-03-07 DIAGNOSIS — Z79.899 LONG TERM USE OF DRUG: ICD-10-CM

## 2024-03-07 DIAGNOSIS — F41.1 GENERALIZED ANXIETY DISORDER: ICD-10-CM

## 2024-03-07 PROCEDURE — 1159F MED LIST DOCD IN RCRD: CPT | Mod: 95,,, | Performed by: NURSE PRACTITIONER

## 2024-03-07 PROCEDURE — 99214 OFFICE O/P EST MOD 30 MIN: CPT | Mod: 95,,, | Performed by: NURSE PRACTITIONER

## 2024-03-07 PROCEDURE — 3008F BODY MASS INDEX DOCD: CPT | Mod: 95,,, | Performed by: NURSE PRACTITIONER

## 2024-03-07 PROCEDURE — 1160F RVW MEDS BY RX/DR IN RCRD: CPT | Mod: 95,,, | Performed by: NURSE PRACTITIONER

## 2024-03-07 RX ORDER — TRAZODONE HYDROCHLORIDE 150 MG/1
TABLET ORAL
Qty: 90 TABLET | Refills: 2 | Status: SHIPPED | OUTPATIENT
Start: 2024-03-07 | End: 2024-04-10 | Stop reason: SDUPTHER

## 2024-03-07 RX ORDER — CARBAMAZEPINE 200 MG/1
200 TABLET ORAL 2 TIMES DAILY
Qty: 60 TABLET | Refills: 2 | Status: SHIPPED | OUTPATIENT
Start: 2024-03-07 | End: 2024-04-10 | Stop reason: SDUPTHER

## 2024-03-07 RX ORDER — ROPINIROLE 1 MG/1
1 TABLET, FILM COATED ORAL NIGHTLY
Qty: 90 TABLET | Refills: 2 | Status: SHIPPED | OUTPATIENT
Start: 2024-03-07 | End: 2024-04-10 | Stop reason: SDUPTHER

## 2024-03-07 RX ORDER — PAROXETINE HYDROCHLORIDE 40 MG/1
40 TABLET, FILM COATED ORAL DAILY
Qty: 30 TABLET | Refills: 0 | Status: SHIPPED | OUTPATIENT
Start: 2024-03-07 | End: 2024-04-10 | Stop reason: SDUPTHER

## 2024-03-07 NOTE — PROGRESS NOTES
"PSYCHIATRIC FOLLOW-UP VISIT NOTE    Chief Complaint   Patient presents with    Medication Refill     Medication management         History of Present Illness  63 y.o. year old White female with hx of bipolar disorder, LILLY, insomnia, and RLS seen today for follow-up appointment and medication management.  Patient reports that she has been doing mostly well since our last visit.  Denies any recent depression.  Feels mood has been stable and she denies any increased impulsivity or irritability.  Has had some persistent anxiety and reports 1 panic attack last week.  Does have a good support system that has been assisting her to cope with her anxiety.  Still easily startled and with occasional racing thoughts.  Did recently see a hematologist in has been encouraged to make some dietary and lifestyle changes.  States she was working on these.  She is sleeping well, averages about 7 or 8 hours per night and feels rested in the morning. Patient denies SI/HI. Denies hallucinations and does not appear to be responding to internal stimuli or be internally preoccupied. No manic symptoms noted.       Objective:     Vitals:  Vitals:    03/07/24 1423   Weight: 86.3 kg (190 lb 4.1 oz)   Height: 4' 11.02" (1.499 m)       Wt Readings from Last 3 Encounters:   03/07/24 1423 86.3 kg (190 lb 4.1 oz)   02/19/24 1112 86.3 kg (190 lb 4.8 oz)   02/01/24 0918 91.6 kg (202 lb)         Medication:    Current Outpatient Medications:     albuterol (PROVENTIL) 2.5 mg /3 mL (0.083 %) nebulizer solution, USE ONE AMPULE BY ORAL NEBULIZATION EVERY 6 HOURS AS NEEDED FOR wheezing, Disp: 3 mL, Rfl: 3    albuterol (PROVENTIL/VENTOLIN HFA) 90 mcg/actuation inhaler, INHALE 2 PUFFS EVERY 4 HOURS FOR SHORTNESS OF BREATH OR WHEEZING -- SHAKE WELL BEFORE USE, Disp: 18 g, Rfl: 2    atorvastatin (LIPITOR) 40 MG tablet, Take 1 tablet (40 mg total) by mouth every evening., Disp: 90 tablet, Rfl: 3    ergocalciferol (ERGOCALCIFEROL) 50,000 unit Cap, Take 1 capsule " (50,000 Units total) by mouth every 7 days., Disp: 12 capsule, Rfl: 3    fluticasone propionate (FLONASE) 50 mcg/actuation nasal spray, 1 spray (50 mcg total) by Each Nostril route once daily., Disp: 16 g, Rfl: 3    GEMTESA 75 mg Tab, Take 1 tablet by mouth Daily., Disp: , Rfl:     ketoconazole (NIZORAL) 2 % cream, Apply topically once daily., Disp: 60 g, Rfl: 3    naproxen (NAPROSYN) 500 MG tablet, TAKE 1 TABLET TWICE DAILY WITH FOOD FOR PAIN / INFLAMMATION, Disp: , Rfl:     rizatriptan (MAXALT) 10 MG tablet, TAKE 1 TABLET DAILY AS NEEDED FOR HEADACHE, Disp: 30 tablet, Rfl: 5    topiramate (TOPAMAX) 25 MG tablet, Take 1 tablet (25 mg total) by mouth 2 (two) times daily., Disp: 180 tablet, Rfl: 3    carBAMazepine (TEGRETOL) 200 mg tablet, Take 1 tablet (200 mg total) by mouth 2 (two) times daily., Disp: 60 tablet, Rfl: 2    paroxetine (PAXIL) 40 MG tablet, Take 1 tablet (40 mg total) by mouth once daily., Disp: 30 tablet, Rfl: 0    rOPINIRole (REQUIP) 1 MG tablet, Take 1 tablet (1 mg total) by mouth every evening., Disp: 90 tablet, Rfl: 2    traZODone (DESYREL) 150 MG tablet, TAKE 1 TABLET AT BEDTIME AS NEEDED FOR INSOMNIA, Disp: 90 tablet, Rfl: 2       Significant Labs: - Reviewed recent labs    Significant Imaging: - none at this time    Physical Exam  Vitals and nursing note reviewed.   Constitutional:       General: She is awake.      Appearance: Normal appearance.   Musculoskeletal:      Comments: deferred   Neurological:      Mental Status: She is alert.   Psychiatric:         Attention and Perception: Attention and perception normal. She does not perceive auditory or visual hallucinations.         Mood and Affect: Affect normal. Mood is anxious.         Speech: Speech normal.         Behavior: Behavior is cooperative.         Thought Content: Thought content does not include homicidal or suicidal ideation.         Cognition and Memory: Cognition and memory normal.          Review of Systems     Mental Status  "Exam:  Presentation:  - Appearance: 63 y.o. year old White female, appears stated age, appears Casually dressed and Well groomed  - Motility: No EPS or Tremors, No psychomotor agitation or retardation appreciated  - Behavior: calm, cooperative, good eye contact  Speech:  - Character/Organization: spontaneous, fluent, normal volume, normal rate, normal rhythm  Emotional State:  - Mood: "anxious"   - Affect: congruent and appropriate  Thought:  - Process: logical, linear, organized , goal-directed  - Preoccupations: no ruminations, rituals, or phobias appreciated  - Delusions: no persecutory, paranoid, or grandiose delusions appreciated  - Perception: denies AVH, not actively responding to internal stimuli  - SI/HI: denies/denies  Sensorium & Intellect:  - Sensorium: AAOx4  - Memory: intact to recent and remote events  - Attention/Concentration: good/good  - Insight/Judgement: good/good    Gait: deferred   MSK:deferred     All other systems without acute issues unless noted in HPI      Assessment/Plan      ICD-10-CM ICD-9-CM    1. Bipolar affective disorder, remission status unspecified  F31.9 296.80 carBAMazepine (TEGRETOL) 200 mg tablet      paroxetine (PAXIL) 40 MG tablet      2. Generalized anxiety disorder  F41.1 300.02 paroxetine (PAXIL) 40 MG tablet      3. Insomnia, unspecified type  G47.00 780.52 traZODone (DESYREL) 150 MG tablet      4. Restless legs syndrome  G25.81 333.94 rOPINIRole (REQUIP) 1 MG tablet      5. Long term use of drug  Z79.899 V58.69 CBC Auto Differential         Increase Paxil to 40 mg daily to target anxiety    Continue other medications without change    CBC w/ diff in 3 weeks. May decrease or discontinue carbamazepine depending on results. Discussed plan with SOPHIE Nicole    Potential side effects and risks vs benefits of current treatment plan reviewed with patient. Applicable black box warnings reviewed. Encouraged patient not to alter dosages or abruptly discontinue medications " without contacting prescriber first, due to risk of worsening symptoms and decompensation of mental status. Warned of risks associated with herbal remedies and supplements while taking psychotropic medications and of the need to consult prescriber prior to adding any of these to current regimen. Patient should abstain from abuse of alcohol, prescription medications, and illicit drugs. Reviewed when to contact clinic and/or seek emergent care, such as but not limited to, onset/worsening SI/HI, hallucinations, delusions, manic symptoms. Pt verbalized understanding and agreement of these warnings/recommendations and verbally consented to treatment plan.    The patient was informed of the restrictions of face-to-face healthcare visits. The patient verbally consented to proceed with a telemedicine visit, following discussion of the options of face-to-face or telemedicine visits. The telemedicine visit was completed using EPIC Video call without complication. The visit was conducted with limited physical exam and was medically appropriate to meet the patient's needs. Duration of call: 16 minutes        Follow up in about 4 weeks (around 4/4/2024) for Medication Management.    MAYTE OrtizP

## 2024-03-15 ENCOUNTER — OFFICE VISIT (OUTPATIENT)
Dept: FAMILY MEDICINE | Facility: CLINIC | Age: 64
End: 2024-03-15
Payer: MEDICARE

## 2024-03-15 VITALS
HEIGHT: 59 IN | BODY MASS INDEX: 36.97 KG/M2 | HEART RATE: 65 BPM | TEMPERATURE: 98 F | OXYGEN SATURATION: 98 % | WEIGHT: 183.38 LBS | DIASTOLIC BLOOD PRESSURE: 70 MMHG | SYSTOLIC BLOOD PRESSURE: 130 MMHG

## 2024-03-15 DIAGNOSIS — R42 DIZZINESS: ICD-10-CM

## 2024-03-15 DIAGNOSIS — R41.3 OTHER AMNESIA: ICD-10-CM

## 2024-03-15 DIAGNOSIS — D69.6 THROMBOCYTOPENIA, UNSPECIFIED: ICD-10-CM

## 2024-03-15 DIAGNOSIS — R03.0 ELEVATED BLOOD PRESSURE READING: ICD-10-CM

## 2024-03-15 DIAGNOSIS — R53.83 FATIGUE, UNSPECIFIED TYPE: ICD-10-CM

## 2024-03-15 DIAGNOSIS — R55 NEAR SYNCOPE: Primary | ICD-10-CM

## 2024-03-15 LAB
ANION GAP SERPL CALC-SCNC: 5 MEQ/L (ref 2–13)
BASOPHILS # BLD AUTO: 0.03 X10(3)/MCL (ref 0.01–0.08)
BASOPHILS NFR BLD AUTO: 0.7 % (ref 0.1–1.2)
BUN SERPL-MCNC: 13 MG/DL (ref 7–20)
CALCIUM SERPL-MCNC: 9.5 MG/DL (ref 8.4–10.2)
CHLORIDE SERPL-SCNC: 109 MMOL/L (ref 98–110)
CO2 SERPL-SCNC: 25 MMOL/L (ref 21–32)
CREAT SERPL-MCNC: 0.75 MG/DL (ref 0.66–1.25)
CREAT/UREA NIT SERPL: 17 (ref 12–20)
EOSINOPHIL # BLD AUTO: 0.23 X10(3)/MCL (ref 0.04–0.36)
EOSINOPHIL NFR BLD AUTO: 5.2 % (ref 0.7–7)
ERYTHROCYTE [DISTWIDTH] IN BLOOD BY AUTOMATED COUNT: 13.3 % (ref 11–14.5)
FERRITIN SERPL-MCNC: 19.7 NG/ML (ref 6.24–264)
FOLATE SERPL-MCNC: 14.4 NG/ML (ref 2.8–20)
GFR SERPLBLD CREATININE-BSD FMLA CKD-EPI: 89 MLS/MIN/1.73/M2
GLUCOSE SERPL-MCNC: 87 MG/DL (ref 70–115)
HCT VFR BLD AUTO: 37.1 % (ref 36–48)
HGB BLD-MCNC: 12.7 G/DL (ref 11.8–16)
IMM GRANULOCYTES # BLD AUTO: 0.01 X10(3)/MCL (ref 0–0.03)
IMM GRANULOCYTES NFR BLD AUTO: 0.2 % (ref 0–0.5)
LYMPHOCYTES # BLD AUTO: 1.14 X10(3)/MCL (ref 1.16–3.74)
LYMPHOCYTES NFR BLD AUTO: 25.9 % (ref 20–55)
MCH RBC QN AUTO: 30.7 PG (ref 27–34)
MCHC RBC AUTO-ENTMCNC: 34.2 G/DL (ref 31–37)
MCV RBC AUTO: 89.6 FL (ref 79–99)
MONOCYTES # BLD AUTO: 0.32 X10(3)/MCL (ref 0.24–0.36)
MONOCYTES NFR BLD AUTO: 7.3 % (ref 4.7–12.5)
NEUTROPHILS # BLD AUTO: 2.68 X10(3)/MCL (ref 1.56–6.13)
NEUTROPHILS NFR BLD AUTO: 60.7 % (ref 37–73)
NRBC BLD AUTO-RTO: 0 %
PLATELET # BLD AUTO: 117 X10(3)/MCL (ref 140–371)
PLATELET # BLD EST: NORMAL 10*3/UL
PMV BLD AUTO: 11.5 FL (ref 9.4–12.4)
POTASSIUM SERPL-SCNC: 3.5 MMOL/L (ref 3.5–5.1)
RBC # BLD AUTO: 4.14 X10(6)/MCL (ref 4–5.1)
SODIUM SERPL-SCNC: 139 MMOL/L (ref 135–145)
VIT B12 SERPL-MCNC: 435 PG/ML (ref 211–946)
WBC # SPEC AUTO: 4.41 X10(3)/MCL (ref 4–11.5)

## 2024-03-15 PROCEDURE — 82728 ASSAY OF FERRITIN: CPT | Performed by: NURSE PRACTITIONER

## 2024-03-15 PROCEDURE — 83540 ASSAY OF IRON: CPT | Performed by: NURSE PRACTITIONER

## 2024-03-15 PROCEDURE — 3078F DIAST BP <80 MM HG: CPT | Mod: ,,, | Performed by: NURSE PRACTITIONER

## 2024-03-15 PROCEDURE — 80048 BASIC METABOLIC PNL TOTAL CA: CPT | Performed by: NURSE PRACTITIONER

## 2024-03-15 PROCEDURE — 3008F BODY MASS INDEX DOCD: CPT | Mod: ,,, | Performed by: NURSE PRACTITIONER

## 2024-03-15 PROCEDURE — 99215 OFFICE O/P EST HI 40 MIN: CPT | Mod: ,,, | Performed by: NURSE PRACTITIONER

## 2024-03-15 PROCEDURE — 3075F SYST BP GE 130 - 139MM HG: CPT | Mod: ,,, | Performed by: NURSE PRACTITIONER

## 2024-03-15 PROCEDURE — 82746 ASSAY OF FOLIC ACID SERUM: CPT | Performed by: NURSE PRACTITIONER

## 2024-03-15 PROCEDURE — 85025 COMPLETE CBC W/AUTO DIFF WBC: CPT | Performed by: NURSE PRACTITIONER

## 2024-03-15 PROCEDURE — 82607 VITAMIN B-12: CPT | Performed by: NURSE PRACTITIONER

## 2024-03-15 NOTE — PROGRESS NOTES
Patient ID: [id number]     Chief Complaint: Follow-up (4mt f/u hemotologist)      HPI:     Sophy Iyer is a 63 y.o. female in the office for Follow-up (4mt f/u hemotologist)    She doesn't feel like she got any significant answers from hematology.  She's lost 23 lbs in the last 3 months, unintentionally. She's also c/o loss of appetie that started after ER visit on 1/18/24.     She's scheduled to see cardiology next month for syncope workup.  Her previous cardiologist doesn't accept her new insurance.    Hx: 2018 syncopal episode on the toilet, had about 10 minute loss of consciousness.   Was referred to cardiologist and has a non-serious leaky valve.  Remembers having bradycardia.     Past Medical History:  has a past medical history of Anemia, Anxiety, Bipolar disorder, Calculus of kidney, Chronic sinusitis, unspecified, Depression, Fatigue, GERD (gastroesophageal reflux disease), Grief, Kidney stone, Left shoulder pain, Migraine, Mixed hyperlipidemia, Obesity, unspecified, Restless legs syndrome (RLS), Trapezius muscle spasm, and Vitamin D deficiency.    Social History:  reports that she has never smoked. She has never been exposed to tobacco smoke. She has never used smokeless tobacco. She reports that she does not drink alcohol and does not use drugs.    Current Outpatient Medications   Medication Instructions    albuterol (PROVENTIL) 2.5 mg /3 mL (0.083 %) nebulizer solution USE ONE AMPULE BY ORAL NEBULIZATION EVERY 6 HOURS AS NEEDED FOR wheezing    albuterol (PROVENTIL/VENTOLIN HFA) 90 mcg/actuation inhaler INHALE 2 PUFFS EVERY 4 HOURS FOR SHORTNESS OF BREATH OR WHEEZING -- SHAKE WELL BEFORE USE    atorvastatin (LIPITOR) 40 mg, Oral, Nightly    carBAMazepine (TEGRETOL) 200 mg, Oral, 2 times daily    ergocalciferol (ERGOCALCIFEROL) 50,000 Units, Oral, Every 7 days    GEMTESA 75 mg Tab 1 tablet, Oral, Daily    ketoconazole (NIZORAL) 2 % cream Topical (Top), Daily    meclizine (ANTIVERT) 12.5 mg, Oral, 3  "times daily PRN    naproxen (NAPROSYN) 500 MG tablet TAKE 1 TABLET TWICE DAILY WITH FOOD FOR PAIN / INFLAMMATION    paroxetine (PAXIL) 60 mg, Oral, Daily    rizatriptan (MAXALT) 10 MG tablet TAKE 1 TABLET DAILY AS NEEDED FOR HEADACHE    rOPINIRole (REQUIP) 1 mg, Oral, Nightly    topiramate (TOPAMAX) 25 mg, Oral, 2 times daily    traZODone (DESYREL) 150 MG tablet TAKE 1 TABLET AT BEDTIME AS NEEDED FOR INSOMNIA       Patient is allergic to penicillins, codeine, and prednisone.     Patient Care Team:  Brittany Hernandez FNP-C as PCP - General (Family Medicine)  Matthew Ferreira PMHNP as Nurse Practitioner (Psychiatry)  Han Ramírez MD as Consulting Physician (Hematology and Oncology)  Justin Beltrán MD (Dermatology)  Robert Gonzalez MD as Consulting Physician (Urology)     Subjective     Review of Systems    See HPI     Objective     Visit Vitals  /70 (BP Location: Left arm)   Pulse 65   Temp 98.2 °F (36.8 °C) (Temporal)   Ht 4' 11" (1.499 m)   Wt 83.2 kg (183 lb 6.4 oz)   SpO2 98%   BMI 37.04 kg/m²       Physical Exam  Vitals reviewed.   Constitutional:       Appearance: Normal appearance. She is obese.   Cardiovascular:      Rate and Rhythm: Normal rate and regular rhythm.   Pulmonary:      Effort: Pulmonary effort is normal.      Breath sounds: Normal breath sounds.   Skin:     General: Skin is warm and dry.   Neurological:      Mental Status: She is alert and oriented to person, place, and time.   Psychiatric:         Mood and Affect: Mood is anxious.         Speech: Speech normal.         Behavior: Behavior normal. Behavior is cooperative.         Assessment & Plan:     1. Near syncope  Assessment & Plan:  Scheduled to see Cardiology on 4/3/24.    Orders:  -     US Carotid Bilateral; Future; Expected date: 03/15/2024  -     MRI Brain W WO Contrast; Future; Expected date: 03/15/2024  -     Echo; Future  -     CBC Auto Differential; Future; Expected date: 03/15/2024  -     Iron and TIBC; Future; " Expected date: 03/15/2024  -     Ferritin; Future; Expected date: 03/15/2024  -     Vitamin B12; Future; Expected date: 03/15/2024  -     Folate; Future; Expected date: 03/15/2024  -     Blood Smear Microscopic Exam    2. Elevated blood pressure reading  Overview:  Noted in ER on 01/18/2024.  Denies prior history of any blood pressure problems.    Assessment & Plan:  Blood pressure log reviewed.  There are some elevated readings though today is only mild systolic elevation.  Asymptomatic, will defer to cardiology.    Orders:  -     Basic Metabolic Panel; Future; Expected date: 03/15/2024    3. Dizziness  -     US Carotid Bilateral; Future; Expected date: 03/15/2024  -     MRI Brain W WO Contrast; Future; Expected date: 03/15/2024  -     Echo; Future  -     CBC Auto Differential; Future; Expected date: 03/15/2024  -     Iron and TIBC; Future; Expected date: 03/15/2024  -     Ferritin; Future; Expected date: 03/15/2024  -     Vitamin B12; Future; Expected date: 03/15/2024  -     Folate; Future; Expected date: 03/15/2024  -     Basic Metabolic Panel; Future; Expected date: 03/15/2024  -     Blood Smear Microscopic Exam    4. Other amnesia  -     US Carotid Bilateral; Future; Expected date: 03/15/2024  -     MRI Brain W WO Contrast; Future; Expected date: 03/15/2024  -     Echo; Future  -     CBC Auto Differential; Future; Expected date: 03/15/2024  -     Iron and TIBC; Future; Expected date: 03/15/2024  -     Ferritin; Future; Expected date: 03/15/2024  -     Vitamin B12; Future; Expected date: 03/15/2024  -     Folate; Future; Expected date: 03/15/2024  -     Blood Smear Microscopic Exam    5. Fatigue, unspecified type  -     Iron and TIBC; Future; Expected date: 03/15/2024  -     Ferritin; Future; Expected date: 03/15/2024    6. Thrombocytopenia, unspecified  Overview:  Noted by hematology to be mild at last visit approximately 1 year ago.     Orders:  -     Iron and TIBC; Future; Expected date: 03/15/2024  -      Ferritin; Future; Expected date: 03/15/2024         No follow-ups on file. In addition to their next scheduled appointment, the patient has also been instructed to follow up on as needed basis.     Future Appointments   Date Time Provider Department Center   5/7/2024 10:30 AM Matthew Ferreira PMHNP [facility]   8/21/2024  8:20 AM LAB, [facility] LABORATORY DRAW STATION [facility]   8/27/2024  1:30 PM Brittany Hernandez, [facility]AVRIL [facility]      JOLLY spent a total of 40 minutes on the day of the visit.  This includes face to face time and non-face to face time preparing to see the patient (eg, review of tests), obtaining and/or reviewing separately obtained history, documenting clinical information in the electronic or other health record, independently interpreting results and communicating results to the patient/family/caregiver, or care coordinator.

## 2024-03-15 NOTE — ASSESSMENT & PLAN NOTE
Blood pressure log reviewed.  There are some elevated readings though today is only mild systolic elevation.  Asymptomatic, will defer to cardiology.

## 2024-03-16 LAB
IRON SATN MFR SERPL: 31 % (ref 20–50)
IRON SERPL-MCNC: 87 UG/DL (ref 50–170)
TIBC SERPL-MCNC: 198 UG/DL (ref 70–310)
TIBC SERPL-MCNC: 285 UG/DL (ref 250–450)
TRANSFERRIN SERPL-MCNC: 236 MG/DL (ref 173–360)

## 2024-03-18 ENCOUNTER — HOSPITAL ENCOUNTER (OUTPATIENT)
Dept: RADIOLOGY | Facility: HOSPITAL | Age: 64
Discharge: HOME | End: 2024-03-18
Attending: NURSE PRACTITIONER
Payer: MEDICARE

## 2024-03-18 DIAGNOSIS — R42 DIZZINESS: ICD-10-CM

## 2024-03-18 DIAGNOSIS — Z12.31 SCREENING MAMMOGRAM FOR BREAST CANCER: ICD-10-CM

## 2024-03-18 DIAGNOSIS — R55 NEAR SYNCOPE: ICD-10-CM

## 2024-03-18 DIAGNOSIS — R41.3 OTHER AMNESIA: ICD-10-CM

## 2024-03-18 PROCEDURE — 93880 EXTRACRANIAL BILAT STUDY: CPT | Mod: TC

## 2024-03-18 PROCEDURE — 77067 SCR MAMMO BI INCL CAD: CPT | Mod: TC

## 2024-03-20 ENCOUNTER — PATIENT MESSAGE (OUTPATIENT)
Dept: FAMILY MEDICINE | Facility: CLINIC | Age: 64
End: 2024-03-20
Payer: MEDICARE

## 2024-03-20 ENCOUNTER — TELEPHONE (OUTPATIENT)
Dept: FAMILY MEDICINE | Facility: CLINIC | Age: 64
End: 2024-03-20
Payer: MEDICARE

## 2024-03-20 NOTE — TELEPHONE ENCOUNTER
----- Message from SOPHIE Cadena sent at 3/19/2024  6:04 PM CDT -----  Normal MMG. Repeat in 1 year.

## 2024-03-20 NOTE — TELEPHONE ENCOUNTER
Spoke with patient and she voiced understanding.    Topical Ketoconazole Counseling: Patient counseled that this medication may cause skin irritation or allergic reactions.  In the event of skin irritation, the patient was advised to reduce the amount of the drug applied or use it less frequently.   The patient verbalized understanding of the proper use and possible adverse effects of ketoconazole.  All of the patient's questions and concerns were addressed.

## 2024-03-20 NOTE — TELEPHONE ENCOUNTER
----- Message from SOPHIE Cadena sent at 3/19/2024  6:07 PM CDT -----  Results are within acceptable ranges for age and conditions. No change in treatment needed at this time. Follow up as scheduled.

## 2024-03-20 NOTE — TELEPHONE ENCOUNTER
----- Message from SOPHIE Cadena sent at 3/19/2024  6:05 PM CDT -----  Results are within acceptable ranges for age and conditions. No change in treatment needed at this time. Follow up as scheduled.

## 2024-03-21 ENCOUNTER — TELEPHONE (OUTPATIENT)
Dept: FAMILY MEDICINE | Facility: CLINIC | Age: 64
End: 2024-03-21
Payer: MEDICARE

## 2024-03-22 ENCOUNTER — PATIENT MESSAGE (OUTPATIENT)
Dept: FAMILY MEDICINE | Facility: CLINIC | Age: 64
End: 2024-03-22
Payer: MEDICARE

## 2024-03-27 ENCOUNTER — PATIENT MESSAGE (OUTPATIENT)
Dept: FAMILY MEDICINE | Facility: CLINIC | Age: 64
End: 2024-03-27

## 2024-03-27 ENCOUNTER — OFFICE VISIT (OUTPATIENT)
Dept: FAMILY MEDICINE | Facility: CLINIC | Age: 64
End: 2024-03-27
Payer: MEDICARE

## 2024-03-27 VITALS — WEIGHT: 183.44 LBS | HEIGHT: 59 IN | BODY MASS INDEX: 36.98 KG/M2

## 2024-03-27 DIAGNOSIS — F41.1 GENERALIZED ANXIETY DISORDER: ICD-10-CM

## 2024-03-27 DIAGNOSIS — R55 NEAR SYNCOPE: ICD-10-CM

## 2024-03-27 DIAGNOSIS — R42 DIZZINESS: Primary | ICD-10-CM

## 2024-03-27 DIAGNOSIS — R53.83 FATIGUE, UNSPECIFIED TYPE: ICD-10-CM

## 2024-03-27 PROCEDURE — 99213 OFFICE O/P EST LOW 20 MIN: CPT | Mod: 95,,, | Performed by: NURSE PRACTITIONER

## 2024-03-27 PROCEDURE — 3008F BODY MASS INDEX DOCD: CPT | Mod: 95,,, | Performed by: NURSE PRACTITIONER

## 2024-03-27 PROCEDURE — 1160F RVW MEDS BY RX/DR IN RCRD: CPT | Mod: 95,,, | Performed by: NURSE PRACTITIONER

## 2024-03-27 PROCEDURE — 1159F MED LIST DOCD IN RCRD: CPT | Mod: 95,,, | Performed by: NURSE PRACTITIONER

## 2024-03-27 RX ORDER — MECLIZINE HCL 12.5 MG 12.5 MG/1
12.5 TABLET ORAL 3 TIMES DAILY PRN
Qty: 30 TABLET | Refills: 1 | Status: SHIPPED | OUTPATIENT
Start: 2024-03-27

## 2024-03-27 NOTE — PROGRESS NOTES
Patient ID: Sophy Iyer  : 1960     Chief Complaint: Follow-up (Low platelets )    Allergies: Patient is allergic to penicillins, codeine, and prednisone.     This is a telemedicine note. Patient was treated using telemedicine, real time audio and video, according to [facility] protocols. IBrittany, conducted the visit from the [facility]. The patient participated in the visit at a non-[facility] location selected by the patient. I am licensed in the state where the patient stated they are located. The patient stated that they understood and accepted the privacy and security risks to their information at their location. The patient verbally consented to proceed with a telemedicine visit. This visit is not recorded. Patient was located at the patient's home.     Video Time Documentation:  Time of call:   Spent  27 minutes with patient face to face discussed health concerns.     History of Present Illness:  The patient is a 63 y.o. White female who scheduled telemedicine visit for chief complaint of Follow-up (Low platelets )     She's following up on her visit from 3/15/24.  She had a few dizzy spells since her last visit.  She c/o fatigue, could sleep most of the day and feeling weak.  Dizzy spell lasted hours. Believes it always occurs with position change.  Took meclizine, helped.  Dizziness faded a bit after 2 hours.     She's decided to delay her cardiology appointment.  She's concerned about cost and reports that she simply can't afford this right now due to her very fixed income.      Her mother had parkinson's disease. Confusion was her first symptom followed by frequent falls.  She's concerned she may have this too.     Reviewed mammogram results - no evidence of malignancy.     Past Medical History:  has a past medical history of Anemia, Anxiety, Bipolar disorder, Calculus of kidney, Chronic sinusitis, unspecified, Depression, Fatigue, GERD (gastroesophageal reflux  disease), Grief, Kidney stone, Left shoulder pain, Migraine, Mixed hyperlipidemia, Obesity, unspecified, Restless legs syndrome (RLS), Trapezius muscle spasm, and Vitamin D deficiency.    Social History:  reports that she has never smoked. She has never been exposed to tobacco smoke. She has never used smokeless tobacco. She reports that she does not drink alcohol and does not use drugs.    Family History: family history includes Bipolar disorder in her father and paternal grandmother; Coronary artery disease in her father; Depression in her father and paternal grandmother; Diabetes in her paternal grandfather; Heart disease in her father; Hypertension in her father and sister; Hypothyroidism in her father, mother, and sister; No Known Problems in her brother, cousin, maternal aunt, maternal grandfather, maternal grandmother, maternal uncle, paternal aunt, paternal uncle, and another family member; Parkinsonism in her mother.    Care Team: Patient Care Team:  Brittany Hernandez FNP-C as PCP - General (Family Medicine)  Matthew Ferreira PMHNP as Nurse Practitioner (Psychiatry)  Han Ramírez MD as Consulting Physician (Hematology and Oncology)  Justin Beltrán MD (Dermatology)  Robert Gonzalez MD as Consulting Physician (Urology)     Current Medications:  Current Outpatient Medications   Medication Instructions    albuterol (PROVENTIL) 2.5 mg /3 mL (0.083 %) nebulizer solution USE ONE AMPULE BY ORAL NEBULIZATION EVERY 6 HOURS AS NEEDED FOR wheezing    albuterol (PROVENTIL/VENTOLIN HFA) 90 mcg/actuation inhaler INHALE 2 PUFFS EVERY 4 HOURS FOR SHORTNESS OF BREATH OR WHEEZING -- SHAKE WELL BEFORE USE    atorvastatin (LIPITOR) 40 mg, Oral, Nightly    carBAMazepine (TEGRETOL) 200 mg, Oral, 2 times daily    ergocalciferol (ERGOCALCIFEROL) 50,000 Units, Oral, Every 7 days    GEMTESA 75 mg Tab 1 tablet, Oral, Daily    ketoconazole (NIZORAL) 2 % cream Topical (Top), Daily    meclizine (ANTIVERT) 12.5 mg, Oral, 3  "times daily PRN    naproxen (NAPROSYN) 500 MG tablet TAKE 1 TABLET TWICE DAILY WITH FOOD FOR PAIN / INFLAMMATION    paroxetine (PAXIL) 60 mg, Oral, Daily    rizatriptan (MAXALT) 10 MG tablet TAKE 1 TABLET DAILY AS NEEDED FOR HEADACHE    rOPINIRole (REQUIP) 1 mg, Oral, Nightly    topiramate (TOPAMAX) 25 mg, Oral, 2 times daily    traZODone (DESYREL) 150 MG tablet TAKE 1 TABLET AT BEDTIME AS NEEDED FOR INSOMNIA       Patient is allergic to penicillins, codeine, and prednisone.     Review of Systems   Constitutional:  Positive for activity change. Negative for unexpected weight change.   HENT:  Negative for hearing loss, rhinorrhea and trouble swallowing.    Eyes:  Positive for visual disturbance. Negative for discharge.   Respiratory:  Negative for chest tightness and wheezing.    Cardiovascular:  Negative for chest pain and palpitations.   Gastrointestinal:  Positive for constipation. Negative for blood in stool, diarrhea and vomiting.   Endocrine: Negative for polydipsia and polyuria.   Genitourinary:  Negative for difficulty urinating, dysuria, hematuria and menstrual problem.   Musculoskeletal:  Positive for arthralgias and neck pain. Negative for joint swelling.   Neurological:  Positive for weakness and headaches.   Psychiatric/Behavioral:  Positive for confusion and dysphoric mood.         Visit Vitals  Ht 4' 11.02" (1.499 m)   Wt 83.2 kg (183 lb 6.8 oz)   BMI 37.03 kg/m²       Physical Exam: LIMITED DUE TO TELEMEDICINE RESTRICTIONS.  General: Well nourished, Alert and oriented, No acute distress.  ENMT: Sclera non-icteric, conversational hearing normal.  Respiratory: Non-labored respirations, no dyspnea with conversation.  Psychiatric: Normal interaction, Coherent speech, anxious.    Assessment & Plan:  1. Dizziness  Assessment & Plan:  Continue meclizine as needed. Discussed ENT referral vs physical therapy.  She declines at this time.  Will notify office if she changes her mind.     Orders:  -     meclizine " (ANTIVERT) 12.5 mg tablet; Take 1 tablet (12.5 mg total) by mouth 3 (three) times daily as needed for Dizziness or Nausea.  Dispense: 30 tablet; Refill: 1    2. Near syncope    3. Fatigue, unspecified type    4. Generalized anxiety disorder  Assessment & Plan:  Encouraged her to follow up with Misael as scheduled.  Notify office for any worsening symptoms.          Future Appointments   Date Time Provider Department Center   4/3/2024  9:50 AM LAB, [facility]   4/10/2024  2:00 PM Matthew Ferreira, PMHNP [facility]   7/19/2024  9:20 AM Han Ramírez MD [facility] [address]   8/21/2024  8:20 AM LAB, [facility]   8/27/2024  1:30 PM Brittany Hernandez, FNP-C [facility]       Follow up in about 4 months (around 7/27/2024). Call sooner if needed.    I spent a total of 27 minutes on the day of the visit.  This includes face to face time and non-face to face time preparing to see the patient (eg, review of tests), obtaining and/or reviewing separately obtained history, documenting clinical information in the electronic or other health record, independently interpreting results and communicating results to the patient/family/caregiver, or care coordinator.

## 2024-04-03 PROCEDURE — 85025 COMPLETE CBC W/AUTO DIFF WBC: CPT | Performed by: NURSE PRACTITIONER

## 2024-04-10 ENCOUNTER — OFFICE VISIT (OUTPATIENT)
Dept: BEHAVIORAL HEALTH | Facility: CLINIC | Age: 64
End: 2024-04-10
Payer: MEDICARE

## 2024-04-10 VITALS — WEIGHT: 183.44 LBS | BODY MASS INDEX: 36.98 KG/M2 | HEIGHT: 59 IN

## 2024-04-10 DIAGNOSIS — G47.00 INSOMNIA, UNSPECIFIED TYPE: ICD-10-CM

## 2024-04-10 DIAGNOSIS — F31.9 BIPOLAR AFFECTIVE DISORDER, REMISSION STATUS UNSPECIFIED: Primary | ICD-10-CM

## 2024-04-10 DIAGNOSIS — G25.81 RESTLESS LEGS SYNDROME: ICD-10-CM

## 2024-04-10 DIAGNOSIS — F41.1 GENERALIZED ANXIETY DISORDER: ICD-10-CM

## 2024-04-10 PROCEDURE — 3008F BODY MASS INDEX DOCD: CPT | Mod: NDTC,,, | Performed by: NURSE PRACTITIONER

## 2024-04-10 PROCEDURE — 1160F RVW MEDS BY RX/DR IN RCRD: CPT | Mod: NDTC,,, | Performed by: NURSE PRACTITIONER

## 2024-04-10 PROCEDURE — 1159F MED LIST DOCD IN RCRD: CPT | Mod: NDTC,,, | Performed by: NURSE PRACTITIONER

## 2024-04-10 PROCEDURE — 99214 OFFICE O/P EST MOD 30 MIN: CPT | Mod: S$PBB,NDTC,, | Performed by: NURSE PRACTITIONER

## 2024-04-10 RX ORDER — PAROXETINE 30 MG/1
60 TABLET, FILM COATED ORAL DAILY
Qty: 60 TABLET | Refills: 1 | Status: SHIPPED | OUTPATIENT
Start: 2024-04-10 | End: 2024-05-15 | Stop reason: SDUPTHER

## 2024-04-10 RX ORDER — ROPINIROLE 1 MG/1
1 TABLET, FILM COATED ORAL NIGHTLY
Qty: 90 TABLET | Refills: 1 | Status: SHIPPED | OUTPATIENT
Start: 2024-04-10 | End: 2024-05-16 | Stop reason: SDUPTHER

## 2024-04-10 RX ORDER — TRAZODONE HYDROCHLORIDE 150 MG/1
TABLET ORAL
Qty: 90 TABLET | Refills: 1 | Status: SHIPPED | OUTPATIENT
Start: 2024-04-10 | End: 2024-05-15 | Stop reason: SDUPTHER

## 2024-04-10 RX ORDER — CARBAMAZEPINE 200 MG/1
200 TABLET ORAL 2 TIMES DAILY
Qty: 60 TABLET | Refills: 1 | Status: SHIPPED | OUTPATIENT
Start: 2024-04-10 | End: 2024-05-15 | Stop reason: SDUPTHER

## 2024-04-10 NOTE — PROGRESS NOTES
"PSYCHIATRIC FOLLOW-UP VISIT NOTE    Chief Complaint   Patient presents with    Medication Management     Medication management         History of Present Illness  63 y.o. year old White female with hx of bipolar disorder, LILLY, insomnia, and RLS seen today for follow-up appointment and medication management.  Patient reports that for the past week or so she has been taking 70 mg of Paxil per day.  Taking 40 mg in the morning and began taking old 30 mg tablets in the evening.  Her against adjusting medication dosages independently.  She does report that she was responded positively to this.  Mood has been better and she has been less anxious.  Also with decreased paranoia and feels better overall.  She was grieving the loss of her cat, who she would to put down in late March.  She was also currently spending the week with her sister in [place], [place], and reports this has been beneficial for her as well.  She is sleeping well at night and feels rested in the mornings.  Denies any side effects or adverse effects from current medication regimen. Patient denies SI/HI. Denies hallucinations and does not appear to be responding to internal stimuli or be internally preoccupied. No manic symptoms noted.       Objective:     Vitals:  Vitals:    04/10/24 1359   Weight: 83.2 kg (183 lb 6.8 oz)   Height: 4' 11.02" (1.499 m)       Wt Readings from Last 3 Encounters:   04/10/24 1359 83.2 kg (183 lb 6.8 oz)   03/27/24 0721 83.2 kg (183 lb 6.8 oz)   03/15/24 1253 83.2 kg (183 lb 6.4 oz)         Medication:    Current Outpatient Medications:     albuterol (PROVENTIL) 2.5 mg /3 mL (0.083 %) nebulizer solution, USE ONE AMPULE BY ORAL NEBULIZATION EVERY 6 HOURS AS NEEDED FOR wheezing, Disp: 3 mL, Rfl: 3    albuterol (PROVENTIL/VENTOLIN HFA) 90 mcg/actuation inhaler, INHALE 2 PUFFS EVERY 4 HOURS FOR SHORTNESS OF BREATH OR WHEEZING -- SHAKE WELL BEFORE USE, Disp: 18 g, Rfl: 2    atorvastatin (LIPITOR) 40 MG tablet, Take 1 tablet (40 mg total) " by mouth every evening., Disp: 90 tablet, Rfl: 3    ergocalciferol (ERGOCALCIFEROL) 50,000 unit Cap, Take 1 capsule (50,000 Units total) by mouth every 7 days., Disp: 12 capsule, Rfl: 3    GEMTESA 75 mg Tab, Take 1 tablet by mouth Daily., Disp: , Rfl:     ketoconazole (NIZORAL) 2 % cream, Apply topically once daily., Disp: 60 g, Rfl: 3    meclizine (ANTIVERT) 12.5 mg tablet, Take 1 tablet (12.5 mg total) by mouth 3 (three) times daily as needed for Dizziness or Nausea., Disp: 30 tablet, Rfl: 1    naproxen (NAPROSYN) 500 MG tablet, TAKE 1 TABLET TWICE DAILY WITH FOOD FOR PAIN / INFLAMMATION, Disp: , Rfl:     rizatriptan (MAXALT) 10 MG tablet, TAKE 1 TABLET DAILY AS NEEDED FOR HEADACHE, Disp: 30 tablet, Rfl: 5    topiramate (TOPAMAX) 25 MG tablet, Take 1 tablet (25 mg total) by mouth 2 (two) times daily., Disp: 180 tablet, Rfl: 3    carBAMazepine (TEGRETOL) 200 mg tablet, Take 1 tablet (200 mg total) by mouth 2 (two) times daily., Disp: 60 tablet, Rfl: 1    paroxetine (PAXIL) 30 MG tablet, Take 2 tablets (60 mg total) by mouth once daily., Disp: 60 tablet, Rfl: 1    rOPINIRole (REQUIP) 1 MG tablet, Take 1 tablet (1 mg total) by mouth every evening., Disp: 90 tablet, Rfl: 1    traZODone (DESYREL) 150 MG tablet, TAKE 1 TABLET AT BEDTIME AS NEEDED FOR INSOMNIA, Disp: 90 tablet, Rfl: 1       Significant Labs: - Reviewed labs from 04/03/24    Significant Imaging: - none at this time    Physical Exam  Vitals and nursing note reviewed.   Constitutional:       General: She is awake.      Appearance: Normal appearance.   Musculoskeletal:      Comments: deferred   Neurological:      Mental Status: She is alert.   Psychiatric:         Attention and Perception: Attention and perception normal. She does not perceive auditory or visual hallucinations.         Mood and Affect: Affect normal.         Speech: Speech normal.         Behavior: Behavior is cooperative.         Thought Content: Thought content does not include homicidal  "or suicidal ideation.         Cognition and Memory: Cognition and memory normal.          Review of Systems     Mental Status Exam:  Presentation:  - Appearance: 63 y.o. year old White female, appears stated age, appears Casually dressed and Well groomed  - Motility: Nurys EPS or Tremors, No psychomotor agitation or retardation appreciated  - Behavior: calm, cooperative, good eye contact  Speech:  - Character/Organization: spontaneous, fluent, normal volume, normal rate, normal rhythm  Emotional State:  - Mood: "happier, less anxious"   - Affect: congruent and appropriate  Thought:  - Process: logical, linear, organized , goal-directed  - Preoccupations: no ruminations, rituals, or phobias appreciated  - Delusions: no persecutory, paranoid, or grandiose delusions appreciated  - Perception: denies AVH, not actively responding to internal stimuli  - SI/HI: denies/denies  Sensorium & Intellect:  - Sensorium: AAOx4  - Memory: intact to recent and remote events  - Attention/Concentration: good/good  - Insight/Judgement: good/good    Gait: deferred   MSK:deferred     All other systems without acute issues unless noted in HPI      Assessment/Plan      ICD-10-CM ICD-9-CM    1. Bipolar affective disorder, remission status unspecified  F31.9 296.80 carBAMazepine (TEGRETOL) 200 mg tablet      paroxetine (PAXIL) 30 MG tablet      2. Generalized anxiety disorder  F41.1 300.02 paroxetine (PAXIL) 30 MG tablet      3. Insomnia, unspecified type  G47.00 780.52 traZODone (DESYREL) 150 MG tablet      4. Restless legs syndrome  G25.81 333.94 rOPINIRole (REQUIP) 1 MG tablet         Increase Paxil to 60 mg daily.  Encouraged patient to not adjust medication dosages independently in the future.  She verbalized understanding and agreement.    Continue current medications without change    Potential side effects and risks vs benefits of current treatment plan reviewed with patient. Applicable black box warnings reviewed. Encouraged patient " not to alter dosages or abruptly discontinue medications without contacting prescriber first, due to risk of worsening symptoms and decompensation of mental status. Warned of risks associated with herbal remedies and supplements while taking psychotropic medications and of the need to consult prescriber prior to adding any of these to current regimen. Patient should abstain from abuse of alcohol, prescription medications, and illicit drugs. Reviewed when to contact clinic and/or seek emergent care, such as but not limited to, onset/worsening SI/HI, hallucinations, delusions, manic symptoms. Pt verbalized understanding and agreement of these warnings/recommendations and verbally consented to treatment plan.        Follow up in about 4 weeks (around 5/8/2024) for Medication Management.    Matthew Ferreira, MAYTEP

## 2024-04-11 ENCOUNTER — TELEPHONE (OUTPATIENT)
Dept: FAMILY MEDICINE | Facility: CLINIC | Age: 64
End: 2024-04-11
Payer: MEDICARE

## 2024-04-11 NOTE — TELEPHONE ENCOUNTER
Called patient and explained to her that they had 2 scripts ready and the other 2 were to early to fill. Patient verbalized understanding.

## 2024-04-22 ENCOUNTER — PATIENT MESSAGE (OUTPATIENT)
Dept: BEHAVIORAL HEALTH | Facility: CLINIC | Age: 64
End: 2024-04-22
Payer: MEDICARE

## 2024-04-24 ENCOUNTER — PATIENT MESSAGE (OUTPATIENT)
Dept: FAMILY MEDICINE | Facility: CLINIC | Age: 64
End: 2024-04-24

## 2024-04-25 ENCOUNTER — TELEPHONE (OUTPATIENT)
Dept: FAMILY MEDICINE | Facility: CLINIC | Age: 64
End: 2024-04-25
Payer: MEDICARE

## 2024-04-26 NOTE — TELEPHONE ENCOUNTER
I called [facility] and they have a script on hold for pt from 4/10/24 that was not picked up. I called [facility] and she has not filled medication since 3/25. [facility] ran it and the medication came back as approved to be refilled. Called and informed pt.

## 2024-04-29 PROBLEM — F41.9 ANXIOUS MOOD: Status: ACTIVE | Noted: 2024-04-29

## 2024-04-30 NOTE — ASSESSMENT & PLAN NOTE
Continue meclizine as needed. Discussed ENT referral vs physical therapy.  She declines at this time.  Will notify office if she changes her mind.

## 2024-05-04 ENCOUNTER — HOSPITAL ENCOUNTER (EMERGENCY)
Facility: HOSPITAL | Age: 64
Discharge: HOME | End: 2024-05-04
Attending: EMERGENCY MEDICINE
Payer: MEDICARE

## 2024-05-04 VITALS
BODY MASS INDEX: 35.28 KG/M2 | HEART RATE: 73 BPM | OXYGEN SATURATION: 98 % | TEMPERATURE: 98 F | SYSTOLIC BLOOD PRESSURE: 148 MMHG | WEIGHT: 175 LBS | RESPIRATION RATE: 16 BRPM | HEIGHT: 59 IN | DIASTOLIC BLOOD PRESSURE: 117 MMHG

## 2024-05-04 DIAGNOSIS — M54.6 CHRONIC LEFT-SIDED THORACIC BACK PAIN: ICD-10-CM

## 2024-05-04 DIAGNOSIS — G89.29 CHRONIC LEFT-SIDED THORACIC BACK PAIN: ICD-10-CM

## 2024-05-04 DIAGNOSIS — J06.9 UPPER RESPIRATORY TRACT INFECTION, UNSPECIFIED TYPE: Primary | ICD-10-CM

## 2024-05-04 LAB
ALBUMIN SERPL-MCNC: 4 G/DL (ref 3.4–4.8)
ALBUMIN/GLOB SERPL: 1.3 RATIO (ref 1.1–2)
ALP SERPL-CCNC: 95 UNIT/L (ref 40–150)
ALT SERPL-CCNC: 25 UNIT/L (ref 0–55)
APPEARANCE UR: CLEAR
AST SERPL-CCNC: 31 UNIT/L (ref 5–34)
BACTERIA #/AREA URNS AUTO: ABNORMAL /HPF
BASOPHILS # BLD AUTO: 0.02 X10(3)/MCL
BASOPHILS NFR BLD AUTO: 0.5 %
BILIRUB SERPL-MCNC: 0.4 MG/DL
BILIRUB UR QL STRIP.AUTO: NEGATIVE
BUN SERPL-MCNC: 20 MG/DL (ref 9.8–20.1)
CALCIUM SERPL-MCNC: 9.7 MG/DL (ref 8.4–10.2)
CHLORIDE SERPL-SCNC: 112 MMOL/L (ref 98–107)
CO2 SERPL-SCNC: 25 MMOL/L (ref 23–31)
COLOR UR AUTO: YELLOW
CREAT SERPL-MCNC: 0.74 MG/DL (ref 0.55–1.02)
EOSINOPHIL # BLD AUTO: 0.17 X10(3)/MCL (ref 0–0.9)
EOSINOPHIL NFR BLD AUTO: 4.2 %
ERYTHROCYTE [DISTWIDTH] IN BLOOD BY AUTOMATED COUNT: 14 % (ref 11.5–17)
FLUAV AG UPPER RESP QL IA.RAPID: NOT DETECTED
FLUBV AG UPPER RESP QL IA.RAPID: NOT DETECTED
GFR SERPLBLD CREATININE-BSD FMLA CKD-EPI: >60 MLS/MIN/1.73/M2
GLOBULIN SER-MCNC: 3.1 GM/DL (ref 2.4–3.5)
GLUCOSE SERPL-MCNC: 112 MG/DL (ref 82–115)
GLUCOSE UR QL STRIP.AUTO: NEGATIVE
HCT VFR BLD AUTO: 40.7 % (ref 37–47)
HGB BLD-MCNC: 13.2 G/DL (ref 12–16)
IMM GRANULOCYTES # BLD AUTO: 0 X10(3)/MCL (ref 0–0.04)
IMM GRANULOCYTES NFR BLD AUTO: 0 %
KETONES UR QL STRIP.AUTO: ABNORMAL
LEUKOCYTE ESTERASE UR QL STRIP.AUTO: ABNORMAL
LIPASE SERPL-CCNC: 33 U/L
LYMPHOCYTES # BLD AUTO: 1.11 X10(3)/MCL (ref 0.6–4.6)
LYMPHOCYTES NFR BLD AUTO: 27.5 %
MCH RBC QN AUTO: 30.6 PG (ref 27–31)
MCHC RBC AUTO-ENTMCNC: 32.4 G/DL (ref 33–36)
MCV RBC AUTO: 94.4 FL (ref 80–94)
MONOCYTES # BLD AUTO: 0.35 X10(3)/MCL (ref 0.1–1.3)
MONOCYTES NFR BLD AUTO: 8.7 %
MUCOUS THREADS URNS QL MICRO: ABNORMAL /LPF
NEUTROPHILS # BLD AUTO: 2.39 X10(3)/MCL (ref 2.1–9.2)
NEUTROPHILS NFR BLD AUTO: 59.1 %
NITRITE UR QL STRIP.AUTO: NEGATIVE
PH UR STRIP.AUTO: 5 [PH]
PLATELET # BLD AUTO: 112 X10(3)/MCL (ref 130–400)
PMV BLD AUTO: 9.8 FL (ref 7.4–10.4)
POTASSIUM SERPL-SCNC: 3.6 MMOL/L (ref 3.5–5.1)
PROT SERPL-MCNC: 7.1 GM/DL (ref 5.8–7.6)
PROT UR QL STRIP.AUTO: NEGATIVE
RBC # BLD AUTO: 4.31 X10(6)/MCL (ref 4.2–5.4)
RBC #/AREA URNS AUTO: ABNORMAL /HPF
RBC UR QL AUTO: ABNORMAL
SARS-COV-2 RNA RESP QL NAA+PROBE: NOT DETECTED
SODIUM SERPL-SCNC: 143 MMOL/L (ref 136–145)
SP GR UR STRIP.AUTO: 1.02 (ref 1–1.03)
SQUAMOUS #/AREA URNS AUTO: ABNORMAL /HPF
UROBILINOGEN UR STRIP-ACNC: 0.2
WBC # SPEC AUTO: 4.04 X10(3)/MCL (ref 4.5–11.5)
WBC #/AREA URNS AUTO: ABNORMAL /HPF

## 2024-05-04 PROCEDURE — 0240U COVID/FLU A&B PCR: CPT | Performed by: EMERGENCY MEDICINE

## 2024-05-04 PROCEDURE — 80053 COMPREHEN METABOLIC PANEL: CPT | Performed by: EMERGENCY MEDICINE

## 2024-05-04 PROCEDURE — 85025 COMPLETE CBC W/AUTO DIFF WBC: CPT | Performed by: EMERGENCY MEDICINE

## 2024-05-04 PROCEDURE — 81003 URINALYSIS AUTO W/O SCOPE: CPT | Performed by: EMERGENCY MEDICINE

## 2024-05-04 PROCEDURE — 99285 EMERGENCY DEPT VISIT HI MDM: CPT | Mod: 25

## 2024-05-04 PROCEDURE — 25000003 PHARM REV CODE 250: Performed by: EMERGENCY MEDICINE

## 2024-05-04 PROCEDURE — 83690 ASSAY OF LIPASE: CPT | Performed by: EMERGENCY MEDICINE

## 2024-05-04 RX ORDER — TRAMADOL HYDROCHLORIDE 50 MG/1
50 TABLET ORAL
Status: COMPLETED | OUTPATIENT
Start: 2024-05-04 | End: 2024-05-04

## 2024-05-04 RX ORDER — AZITHROMYCIN 250 MG/1
250 TABLET, FILM COATED ORAL DAILY
Qty: 6 TABLET | Refills: 0 | Status: SHIPPED | OUTPATIENT
Start: 2024-05-04 | End: 2024-05-04

## 2024-05-04 RX ORDER — TRAMADOL HYDROCHLORIDE 50 MG/1
50 TABLET ORAL EVERY 6 HOURS PRN
Qty: 12 TABLET | Refills: 0 | Status: SHIPPED | OUTPATIENT
Start: 2024-05-04

## 2024-05-04 RX ORDER — TRAMADOL HYDROCHLORIDE 50 MG/1
50 TABLET ORAL EVERY 6 HOURS PRN
Qty: 12 TABLET | Refills: 0 | Status: SHIPPED | OUTPATIENT
Start: 2024-05-04 | End: 2024-05-04

## 2024-05-04 RX ORDER — AZITHROMYCIN 250 MG/1
250 TABLET, FILM COATED ORAL DAILY
Qty: 6 TABLET | Refills: 0 | Status: SHIPPED | OUTPATIENT
Start: 2024-05-04 | End: 2024-05-15

## 2024-05-04 RX ADMIN — TRAMADOL HYDROCHLORIDE 50 MG: 50 TABLET, COATED ORAL at 08:05

## 2024-05-04 NOTE — ED PROVIDER NOTES
Encounter Date: 5/4/2024       History     Chief Complaint   Patient presents with    Chills     Flu like symptoms for 3 days     HPI  63-year-old female history of previous cholecystectomy, chronic left upper back pain, GERD, HLD, renal colic now brought in by ambulance for 4 day history of nonproductive cough, chills and left mid back pain.  Patient states the pain is 3/10, no exacerbating alleviating factors, dull ache.  Patient denies associated fever, substernal chest pain, shortness of breath, nausea, vomiting, diarrhea, melena, bright red blood per rectum, dysuria, hematuria.  Patient is fully vaccinated against COVID-19 and influenza with no ill contacts.  Review of patient's allergies indicates:   Allergen Reactions    Penicillins     Codeine Rash    Prednisone Anxiety     Past Medical History:   Diagnosis Date    Anemia     Anxiety     Bipolar disorder     Calculus of kidney 2/22/2023    Chronic sinusitis, unspecified     Depression     Fatigue     GERD (gastroesophageal reflux disease)     Grief     Kidney stone     Left shoulder pain     Migraine     Mixed hyperlipidemia     Obesity, unspecified     Restless legs syndrome (RLS)     Trapezius muscle spasm     Vitamin D deficiency      Past Surgical History:   Procedure Laterality Date    CARPAL TUNNEL RELEASE      CHOLECYSTECTOMY      COLON BIOPSY      COLON SURGERY      HYSTERECTOMY       Family History   Problem Relation Name Age of Onset    Hypothyroidism Mother      Parkinsonism Mother      Bipolar disorder Father Poppa     Depression Father Poppa     Coronary artery disease Father Poppa     Hypertension Father Poppa     Hypothyroidism Father Poppa     Heart disease Father Poppa     Hypertension Sister Radha     Hypothyroidism Sister Radha     No Known Problems Brother      No Known Problems Maternal Aunt      No Known Problems Paternal Aunt      No Known Problems Maternal Uncle      No Known Problems Paternal Uncle      No Known Problems Maternal  Grandfather      No Known Problems Maternal Grandmother      Diabetes Paternal Grandfather Paw     Bipolar disorder Paternal Grandmother Mawmaw     Depression Paternal Grandmother Mawmaw     No Known Problems Cousin      No Known Problems Other       Social History     Tobacco Use    Smoking status: Never     Passive exposure: Never    Smokeless tobacco: Never   Substance Use Topics    Alcohol use: Never    Drug use: Never     Review of Systems   All other systems reviewed and are negative.      Physical Exam     Initial Vitals [05/04/24 0554]   BP Pulse Resp Temp SpO2   (!) 147/75 69 18 98.4 °F (36.9 °C) 100 %      MAP       --         Physical Exam    Nursing note and vitals reviewed.  Constitutional: She appears well-developed and well-nourished.   HENT:   Head: Normocephalic and atraumatic.   Eyes: Conjunctivae and EOM are normal. Pupils are equal, round, and reactive to light.   Neck: Neck supple.   Normal range of motion.  Cardiovascular:  Normal rate, regular rhythm, normal heart sounds and intact distal pulses.           Pulmonary/Chest: Breath sounds normal.   Abdominal: Abdomen is soft. Bowel sounds are normal.   Musculoskeletal:         General: Normal range of motion.      Cervical back: Normal range of motion and neck supple.     Neurological: She is alert and oriented to person, place, and time.   Skin: Skin is warm and dry. Capillary refill takes less than 2 seconds.   Psychiatric: She has a normal mood and affect. Her behavior is normal. Judgment and thought content normal.         ED Course   Procedures  Labs Reviewed   COMPREHENSIVE METABOLIC PANEL - Abnormal; Notable for the following components:       Result Value    Chloride 112 (*)     All other components within normal limits   URINALYSIS - Abnormal; Notable for the following components:    Ketones, UA Trace (*)     Blood, UA 2+ (*)     Leukocyte Esterase, UA Trace (*)     All other components within normal limits   CBC WITH DIFFERENTIAL -  Abnormal; Notable for the following components:    WBC 4.04 (*)     MCV 94.4 (*)     MCHC 32.4 (*)     Platelet 112 (*)     All other components within normal limits   URINALYSIS, MICROSCOPIC - Abnormal; Notable for the following components:    Mucous, UA Moderate (*)     RBC, UA 11-20 (*)     Squamous Epithelial Cells, UA Few (*)     All other components within normal limits   COVID/FLU A&B PCR - Normal    Narrative:     The Xpert Xpress SARS-CoV-2/FLU/RSV plus is a rapid, multiplexed real-time PCR test intended for the simultaneous qualitative detection and differentiation of SARS-CoV-2, Influenza A, Influenza B, and respiratory syncytial virus (RSV) viral RNA in either nasopharyngeal swab or nasal swab specimens.         LIPASE - Normal   CBC W/ AUTO DIFFERENTIAL    Narrative:     The following orders were created for panel order CBC auto differential.  Procedure                               Abnormality         Status                     ---------                               -----------         ------                     CBC with Differential[[id number]]       Abnormal            Final result                 Please view results for these tests on the individual orders.          Imaging Results              CT Renal Stone Study ABD Pelvis WO (Final result)  Result time 05/04/24 10:10:12      Final result by Jose C Jackson MD (05/04/24 10:10:12)                   Impression:      1. Cirrhotic morphology liver with stigmata of portal hypertension.  2. No acute process of the abdomen or pelvis.      Electronically signed by: Jose C Jacskon MD  Date:    05/04/2024  Time:    10:10               Narrative:    EXAMINATION:  CT RENAL STONE STUDY ABD PELVIS WO    CLINICAL HISTORY:  Nephrolithiasis, uncomplicated;    TECHNIQUE:  Multiple cross-section of the obtained from the lung bases the pubic symphysis without the use of contrast.  Coronal and sagittal reformatted images were obtained.  An automated dose exposure  technique was utilized.  This limits radiation does the patient.    COMPARISON:  11/29/2022    FINDINGS:  Dependent atelectatic changes lungs are identified.  Heart size within normal limits.    Evaluation of hollow and solid viscera is limited secondary to technique.    The liver appears to be atretic with cirrhotic morphology.  Gallbladder is surgically absent.  The spleen is enlarged with innumerable periesophageal and perigastric varices.  The adrenals and pancreas are normal.  Kidneys are symmetric in size with nonobstructing calculi.    Small hernia.  Small bowel is of normal caliber.  The colon is also normal caliber with scattered colonic diverticula.  Questionable postsurgical changes are identified of the left upper quadrant.  Possible resection of prior transverse colon/splenic flexure.    Uterus is surgically absent.  Bladder is under distended with pelvic floor XA shin.  No adnexal mass.  The course and caliber of the abdominal is normal.  No free fluid in the abdomen pelvis.  No evidence for adenopathy.    No suspicious osseous lesions.  Scattered spondylotic changes are identified.  Soft tissues are grossly normal.                        Preliminary result by Eric Sheth MD (05/04/24 07:31:19)                   Impression:    1. The liver is small with a nodular contour. The spleen is somewhat enlarged. Multiple tortuous structures are seen in the paraesophageal, gastrohepatic and gastrosplenic region relating to collateral vessels. This is consistent with cirrhosis with portal hypertension including gastroesophageal varices.  2. No acute intraabdominal or pelvic solid organ or bowel pathology identified. Details and other findings as discussed above.               Narrative:    START OF REPORT:  Technique: CT of the abdomen and pelvis was performed with axial images as well as sagittal and coronal reconstruction images without intravenous contrast.    Comparison: None available.    Clinical  History: Left flank pain.    Dosage Information: Automated Exposure Control was utilized 526.57 mGy.cm.    Findings:  Lines and Tubes: None.  Thorax:  Lungs: No focal infiltrate or consolidation is seen.  Pleura: No effusions or thickening.  Heart: The heart size is within normal limits.  Liver: The liver is small with a nodular contour. The spleen is somewhat enlarged. Multiple tortuous structures are seen in the paraesophageal, gastrohepatic and gastrosplenic region relating to collateral vessels.  Biliary System: No intrahepatic or extrahepatic biliary duct dilatation is seen.  Gallbladder: Surgical clips are seen in the gallbladder fossa which may reflect prior cholecystectomy correlate with surgical history and visual inspection.  Pancreas: The pancreas appears unremarkable.  Spleen: The spleen appears mildly prominent but otherwise unremarkable.  Adrenals: The adrenal glands appear unremarkable.  Kidneys: A single stone measuring 4.8 mm is seen on Image 65, Series 2 in the lower pole of the left kidney. The left kidney otherwise appears unremarkable with no cysts masses or hydronephrosis identified. A few nonobstructive intrarenal kidney stones are seen in the right kidney. The larger stone measures 6.2 mm is on Image 61, Series 2 in the lower pole of the right kidney. The right kidney otherwise appears unremarkable with no cysts masses or hydronephrosis identified.  Aorta: The abdominal aorta appears unremarkable.  Bowel:  Esophagus: The visualized esophagus appears unremarkable.  Stomach: The stomach appears unremarkable.  Duodenum: Unremarkable appearing duodenum.  Colon: There is moderate stool in the colon which could reflect an element of constipation. Surgical suture is seen in the colonic segment in the left upper abdomen.  Appendix: No appendix is identified and surgical clips are seen at the base of the cecum consistent with appendectomy.  Peritoneum: No intraperitoneal free air or ascites is  seen.    Pelvis:  Bladder: The bladder is nondistended and cannot be definitively evaluated.  Female:  Uterus: The uterus is not identified.  Ovaries: The ovaries are not identified.    Bony structures:  Dorsal Spine: There is mild spondylosis of the visualized dorsal spine.  Bony Pelvis: There is mild degenerative change of the bilateral hips.                                         X-Ray Chest AP Portable (Final result)  Result time 05/04/24 09:51:48      Final result by Jose C Jackson MD (05/04/24 09:51:48)                   Impression:      No acute abnormality.      Electronically signed by: Jose C Jackson MD  Date:    05/04/2024  Time:    09:51               Narrative:    EXAMINATION:  XR CHEST AP PORTABLE    CLINICAL HISTORY:  Unspecified abdominal pain    TECHNIQUE:  Single frontal view of the chest was performed.    COMPARISON:  01/18/2024    FINDINGS:  The lungs are clear, with normal appearance of pulmonary vasculature and no pleural effusion or pneumothorax.    The cardiac silhouette is normal in size. The hilar and mediastinal contours are unremarkable.    Bones are intact.                                       Medications   traMADoL tablet 50 mg (50 mg Oral Given 5/4/24 0844)     Medical Decision Making  Amount and/or Complexity of Data Reviewed  Labs: ordered.  Radiology: ordered.    Risk  Prescription drug management.                     63-year-old female complaining of cough, body aches, decreased appetite, chills and left upper back pain times 4 days with no associated documented fever, shortness of breath, chest pain, abdominal pain.  Vital signs stable in ED, afebrile, O2 sat 98% on room air.  Patient is tender to palpation over the left mid trapezius, lungs clear to auscultation bilaterally.  Labs remarkable for a platelets of 112, WBC count 4.0.  Patient has normal lipase, LFTs, hemoglobin.  RSV, influenza, COVID all negative.  UA with 11-20 RBCs, trace leukocyte esterase, no significant  increasing WBCs.  Chest x-ray with no acute findings.  CT renal stone study with no obvious obstructing stones but does demonstrate cirrhosis with portal hypertension and varices, see full report.  Patient feeling much better after tramadol 50 mg p.o. here in ED. we will discharge patient home with Zithromax for her upper respiratory infection, tramadol for severe pain which he is taken in the past, Tylenol for mild-to-moderate pain warm compresses to affected area close follow up with PCP in 1-2 days for recheck, return for worsening symptoms or any other concerns.                 Clinical Impression:  Final diagnoses:  [J06.9] Upper respiratory tract infection, unspecified type (Primary)  [M54.6, G89.29] Chronic left-sided thoracic back pain          ED Disposition Condition    Discharge Stable          ED Prescriptions       Medication Sig Dispense Start Date End Date Auth. Provider    azithromycin (Z-FEROZ) 250 MG tablet  (Status: Discontinued) Take 1 tablet (250 mg total) by mouth once daily. Take first 2 tablets together, then 1 every day until finished. 6 tablet 5/4/2024 5/4/2024 Grady Cross MD    traMADoL (ULTRAM) 50 mg tablet  (Status: Discontinued) Take 1 tablet (50 mg total) by mouth every 6 (six) hours as needed for Pain. 12 tablet 5/4/2024 5/4/2024 Grady Cross MD    azithromycin (Z-FEROZ) 250 MG tablet Take 1 tablet (250 mg total) by mouth once daily. Take first 2 tablets together, then 1 every day until finished. 6 tablet 5/4/2024 -- Grady Cross MD    traMADoL (ULTRAM) 50 mg tablet Take 1 tablet (50 mg total) by mouth every 6 (six) hours as needed for Pain. 12 tablet 5/4/2024 -- Grady Cross MD          Follow-up Information       Follow up With Specialties Details Why Contact Info    Brittany Hernandez, MARIOP-C Family Medicine Schedule an appointment as soon as possible for a visit in 1 day  [address] [facility]  [address]  [facility]  [place] [address]  [phone]                Grady Cross MD  05/05/24 0608

## 2024-05-09 ENCOUNTER — TELEPHONE (OUTPATIENT)
Dept: FAMILY MEDICINE | Facility: CLINIC | Age: 64
End: 2024-05-09
Payer: MEDICARE

## 2024-05-09 NOTE — TELEPHONE ENCOUNTER
Explained to patient that she would have to come in the office for a visit first to get that if she qualified for it.  She voiced understanding.

## 2024-05-15 ENCOUNTER — OFFICE VISIT (OUTPATIENT)
Dept: BEHAVIORAL HEALTH | Facility: CLINIC | Age: 64
End: 2024-05-15
Payer: MEDICARE

## 2024-05-15 VITALS
WEIGHT: 176.81 LBS | SYSTOLIC BLOOD PRESSURE: 120 MMHG | HEIGHT: 59 IN | HEART RATE: 64 BPM | TEMPERATURE: 98 F | BODY MASS INDEX: 35.64 KG/M2 | DIASTOLIC BLOOD PRESSURE: 72 MMHG | OXYGEN SATURATION: 98 %

## 2024-05-15 DIAGNOSIS — G47.00 INSOMNIA, UNSPECIFIED TYPE: ICD-10-CM

## 2024-05-15 DIAGNOSIS — G25.81 RESTLESS LEGS SYNDROME: ICD-10-CM

## 2024-05-15 DIAGNOSIS — F41.1 GENERALIZED ANXIETY DISORDER: ICD-10-CM

## 2024-05-15 DIAGNOSIS — F31.9 BIPOLAR AFFECTIVE DISORDER, REMISSION STATUS UNSPECIFIED: Primary | ICD-10-CM

## 2024-05-15 PROCEDURE — 3008F BODY MASS INDEX DOCD: CPT | Mod: ,,, | Performed by: NURSE PRACTITIONER

## 2024-05-15 PROCEDURE — 1159F MED LIST DOCD IN RCRD: CPT | Mod: ,,, | Performed by: NURSE PRACTITIONER

## 2024-05-15 PROCEDURE — 3078F DIAST BP <80 MM HG: CPT | Mod: ,,, | Performed by: NURSE PRACTITIONER

## 2024-05-15 PROCEDURE — 3074F SYST BP LT 130 MM HG: CPT | Mod: ,,, | Performed by: NURSE PRACTITIONER

## 2024-05-15 PROCEDURE — 1160F RVW MEDS BY RX/DR IN RCRD: CPT | Mod: ,,, | Performed by: NURSE PRACTITIONER

## 2024-05-15 PROCEDURE — 99214 OFFICE O/P EST MOD 30 MIN: CPT | Mod: ,,, | Performed by: NURSE PRACTITIONER

## 2024-05-15 RX ORDER — PAROXETINE 30 MG/1
60 TABLET, FILM COATED ORAL DAILY
Qty: 60 TABLET | Refills: 1 | Status: SHIPPED | OUTPATIENT
Start: 2024-05-15

## 2024-05-15 RX ORDER — FLUTICASONE PROPIONATE 50 MCG
1 SPRAY, SUSPENSION (ML) NASAL DAILY
COMMUNITY
Start: 2024-04-25

## 2024-05-15 RX ORDER — TRAZODONE HYDROCHLORIDE 150 MG/1
TABLET ORAL
Qty: 90 TABLET | Refills: 1 | Status: SHIPPED | OUTPATIENT
Start: 2024-05-15

## 2024-05-15 RX ORDER — CARBAMAZEPINE 200 MG/1
200 TABLET ORAL 2 TIMES DAILY
Qty: 60 TABLET | Refills: 1 | Status: SHIPPED | OUTPATIENT
Start: 2024-05-15

## 2024-05-15 NOTE — PROGRESS NOTES
"PSYCHIATRIC FOLLOW-UP VISIT NOTE    Chief Complaint   Patient presents with    anxiety and mediction refill     Anxiety and medication refill         History of Present Illness  63 y.o. year old White female with hx of bipolar disorder, LILLY, insomnia, and RLS seen today for follow-up appointment and medication management.  Patient reports that she has been doing well from a psychiatric perspective since our last visit.  On May 4th she did have to go to the emergency room in [place] it was diagnosed with an  upper respiratory infection and a UTI.  Has completed her antibiotics and reports she is feeling better but still fatigued at times.  Did have some heightened anxiety and mild paranoia during her illness but this has since improved.  Has noticed some increased in her restless legs syndrome over the past week or so.  Denies any recent depression.  Mood has been stable and she denies any increased impulsivity, irritability, or mood lability.  Sleeping well at night but does experience some daytime fatigue some days of the week.  Denies any side effects from current medication regimen. Patient denies SI/HI. Denies hallucinations and does not appear to be responding to internal stimuli or be internally preoccupied. No manic symptoms noted.       Objective:     Vitals:  Vitals:    05/15/24 1053   BP: 120/72   BP Location: Right arm   Patient Position: Sitting   BP Method: Large (Manual)   Pulse: 64   Temp: 98.4 °F (36.9 °C)   TempSrc: Temporal   SpO2: 98%   Weight: 80.2 kg (176 lb 12.9 oz)   Height: 4' 11.02" (1.499 m)       Wt Readings from Last 3 Encounters:   05/15/24 1053 80.2 kg (176 lb 12.9 oz)   05/04/24 0554 79.4 kg (175 lb)   04/10/24 1359 83.2 kg (183 lb 6.8 oz)         Medication:    Current Outpatient Medications:     albuterol (PROVENTIL/VENTOLIN HFA) 90 mcg/actuation inhaler, INHALE 2 PUFFS EVERY 4 HOURS FOR SHORTNESS OF BREATH OR WHEEZING -- SHAKE WELL BEFORE USE, Disp: 18 g, Rfl: 2    atorvastatin " (LIPITOR) 40 MG tablet, Take 1 tablet (40 mg total) by mouth every evening., Disp: 90 tablet, Rfl: 3    ergocalciferol (ERGOCALCIFEROL) 50,000 unit Cap, Take 1 capsule (50,000 Units total) by mouth every 7 days., Disp: 12 capsule, Rfl: 3    fluticasone propionate (FLONASE) 50 mcg/actuation nasal spray, 1 spray by Each Nostril route Daily., Disp: , Rfl:     ketoconazole (NIZORAL) 2 % cream, Apply topically once daily., Disp: 60 g, Rfl: 3    meclizine (ANTIVERT) 12.5 mg tablet, Take 1 tablet (12.5 mg total) by mouth 3 (three) times daily as needed for Dizziness or Nausea., Disp: 30 tablet, Rfl: 1    naproxen (NAPROSYN) 500 MG tablet, TAKE 1 TABLET TWICE DAILY WITH FOOD FOR PAIN / INFLAMMATION, Disp: , Rfl:     rizatriptan (MAXALT) 10 MG tablet, TAKE 1 TABLET DAILY AS NEEDED FOR HEADACHE, Disp: 30 tablet, Rfl: 5    rOPINIRole (REQUIP) 1 MG tablet, Take 1 tablet (1 mg total) by mouth every evening., Disp: 90 tablet, Rfl: 1    topiramate (TOPAMAX) 25 MG tablet, Take 1 tablet (25 mg total) by mouth 2 (two) times daily., Disp: 180 tablet, Rfl: 3    traMADoL (ULTRAM) 50 mg tablet, Take 1 tablet (50 mg total) by mouth every 6 (six) hours as needed for Pain., Disp: 12 tablet, Rfl: 0    carBAMazepine (TEGRETOL) 200 mg tablet, Take 1 tablet (200 mg total) by mouth 2 (two) times daily., Disp: 60 tablet, Rfl: 1    paroxetine (PAXIL) 30 MG tablet, Take 2 tablets (60 mg total) by mouth once daily., Disp: 60 tablet, Rfl: 1    traZODone (DESYREL) 150 MG tablet, TAKE 1 TABLET AT BEDTIME AS NEEDED FOR INSOMNIA, Disp: 90 tablet, Rfl: 1       Significant Labs: - none at this time    Significant Imaging: - none at this time    Physical Exam  Vitals and nursing note reviewed.   Constitutional:       General: She is awake.      Appearance: Normal appearance.   Musculoskeletal:      Comments: Full ROM   Neurological:      Mental Status: She is alert.   Psychiatric:         Attention and Perception: Attention and perception normal. She does  "not perceive auditory or visual hallucinations.         Mood and Affect: Affect normal.         Speech: Speech normal.         Behavior: Behavior is cooperative.         Thought Content: Thought content does not include homicidal or suicidal ideation.         Cognition and Memory: Cognition and memory normal.          Review of Systems     Mental Status Exam:  Presentation:  - Appearance: 63 y.o. year old White female, appears stated age, appears Casually dressed and Well groomed  - Motility: Erect when standing, Steady gait, No EPS or Tremors, No psychomotor agitation or retardation appreciated  - Behavior: calm, cooperative, good eye contact  Speech:  - Character/Organization: spontaneous, fluent, normal volume, normal rate, normal rhythm  Emotional State:  - Mood: "mildly anxious"   - Affect: congruent and appropriate  Thought:  - Process: logical, linear, organized , goal-directed  - Preoccupations: no ruminations, rituals, or phobias appreciated  - Delusions: no persecutory, paranoid, or grandiose delusions appreciated  - Perception: denies AVH, not actively responding to internal stimuli  - SI/HI: denies/denies  Sensorium & Intellect:  - Sensorium: AAOx4  - Memory: intact to recent and remote events  - Attention/Concentration: good/good  - Insight/Judgement: good/good    Gait: normal swing and stance  MSK:no rigidity appreciated    All other systems without acute issues unless noted in HPI      Assessment/Plan      ICD-10-CM ICD-9-CM    1. Bipolar affective disorder, remission status unspecified  F31.9 296.80 paroxetine (PAXIL) 30 MG tablet      carBAMazepine (TEGRETOL) 200 mg tablet      2. Generalized anxiety disorder  F41.1 300.02 paroxetine (PAXIL) 30 MG tablet      3. Insomnia, unspecified type  G47.00 780.52 traZODone (DESYREL) 150 MG tablet      4. Restless legs syndrome  G25.81 333.94          Continue current medications without change. Consider increasing Requip if RLS symptoms persist.    Potential " side effects and risks vs benefits of current treatment plan reviewed with patient. Applicable black box warnings reviewed. Encouraged patient not to alter dosages or abruptly discontinue medications without contacting prescriber first, due to risk of worsening symptoms and decompensation of mental status. Warned of risks associated with herbal remedies and supplements while taking psychotropic medications and of the need to consult prescriber prior to adding any of these to current regimen. Patient should abstain from abuse of alcohol, prescription medications, and illicit drugs. Reviewed when to contact clinic and/or seek emergent care, such as but not limited to, onset/worsening SI/HI, hallucinations, delusions, manic symptoms. Pt verbalized understanding and agreement of these warnings/recommendations and verbally consented to treatment plan.      Follow up in about 2 months (around 7/15/2024) for Medication Management.    Matthew Ferreira, MAYTEP

## 2024-05-16 ENCOUNTER — OFFICE VISIT (OUTPATIENT)
Dept: FAMILY MEDICINE | Facility: CLINIC | Age: 64
End: 2024-05-16
Payer: MEDICARE

## 2024-05-16 VITALS
HEART RATE: 64 BPM | DIASTOLIC BLOOD PRESSURE: 82 MMHG | OXYGEN SATURATION: 98 % | SYSTOLIC BLOOD PRESSURE: 118 MMHG | HEIGHT: 59 IN | BODY MASS INDEX: 34.27 KG/M2 | TEMPERATURE: 98 F | WEIGHT: 170 LBS

## 2024-05-16 DIAGNOSIS — K74.60 ESOPHAGEAL VARICES IN CIRRHOSIS: ICD-10-CM

## 2024-05-16 DIAGNOSIS — G25.81 RESTLESS LEGS SYNDROME: ICD-10-CM

## 2024-05-16 DIAGNOSIS — K74.60 HEPATIC CIRRHOSIS, UNSPECIFIED HEPATIC CIRRHOSIS TYPE, UNSPECIFIED WHETHER ASCITES PRESENT: Primary | ICD-10-CM

## 2024-05-16 DIAGNOSIS — R16.1 SPLENOMEGALY: ICD-10-CM

## 2024-05-16 DIAGNOSIS — I85.10 ESOPHAGEAL VARICES IN CIRRHOSIS: ICD-10-CM

## 2024-05-16 PROCEDURE — 1160F RVW MEDS BY RX/DR IN RCRD: CPT | Mod: ,,, | Performed by: NURSE PRACTITIONER

## 2024-05-16 PROCEDURE — 3079F DIAST BP 80-89 MM HG: CPT | Mod: ,,, | Performed by: NURSE PRACTITIONER

## 2024-05-16 PROCEDURE — 3074F SYST BP LT 130 MM HG: CPT | Mod: ,,, | Performed by: NURSE PRACTITIONER

## 2024-05-16 PROCEDURE — 3008F BODY MASS INDEX DOCD: CPT | Mod: ,,, | Performed by: NURSE PRACTITIONER

## 2024-05-16 PROCEDURE — 99215 OFFICE O/P EST HI 40 MIN: CPT | Mod: ,,, | Performed by: NURSE PRACTITIONER

## 2024-05-16 PROCEDURE — 1159F MED LIST DOCD IN RCRD: CPT | Mod: ,,, | Performed by: NURSE PRACTITIONER

## 2024-05-16 RX ORDER — ROPINIROLE 1 MG/1
1 TABLET, FILM COATED ORAL NIGHTLY
Qty: 90 TABLET | Refills: 1 | Status: SHIPPED | OUTPATIENT
Start: 2024-05-16

## 2024-05-16 NOTE — PROGRESS NOTES
Patient ID: [id number]     Chief Complaint: ER Follow up      HPI:     Sophy Iyer is a 63 y.o. female in the office for ER Follow up  She was brought to ER by ambulance for upper respiratory symptoms for 4 days.  She had cough, chills, left back pain. Abnormal findings on CT abdomen and she'd like to discuss that.        5/4/24 - CT abdomen pelvis wo contrast  FINDINGS: Dependent atelectatic changes lungs are identified.  Heart size within normal limits.     Evaluation of hollow and solid viscera is limited secondary to technique.     The liver appears to be atretic with cirrhotic morphology.  Gallbladder is surgically absent.  The spleen is enlarged with innumerable periesophageal and perigastric varices.  The adrenals and pancreas are normal.  Kidneys are symmetric in size with nonobstructing calculi.     Small hernia.  Small bowel is of normal caliber.  The colon is also normal caliber with scattered colonic diverticula.  Questionable postsurgical changes are identified of the left upper quadrant.  Possible resection of prior transverse colon/splenic flexure.     Uterus is surgically absent.  Bladder is under distended with pelvic floor XA shin.  No adnexal mass.  The course and caliber of the abdominal is normal.  No free fluid in the abdomen pelvis.  No evidence for adenopathy.     No suspicious osseous lesions.  Scattered spondylotic changes are identified.  Soft tissues are grossly normal.     Impression:     1. Cirrhotic morphology liver with stigmata of portal hypertension.  2. No acute process of the abdomen or pelvis.    Past Medical History:  has a past medical history of Anemia, Anxiety, Bipolar disorder, Calculus of kidney, Chronic sinusitis, unspecified, Depression, Fatigue, GERD (gastroesophageal reflux disease), Grief, Kidney stone, Left shoulder pain, Migraine, Mixed hyperlipidemia, Obesity, unspecified, Restless legs syndrome (RLS), Trapezius muscle spasm, and Vitamin D deficiency.    Social  "History:  reports that she has never smoked. She has never been exposed to tobacco smoke. She has never used smokeless tobacco. She reports that she does not drink alcohol and does not use drugs.    Current Outpatient Medications   Medication Instructions    albuterol (PROVENTIL/VENTOLIN HFA) 90 mcg/actuation inhaler INHALE 2 PUFFS EVERY 4 HOURS FOR SHORTNESS OF BREATH OR WHEEZING -- SHAKE WELL BEFORE USE    atorvastatin (LIPITOR) 40 mg, Oral, Nightly    carBAMazepine (TEGRETOL) 200 mg, Oral, 2 times daily    ergocalciferol (ERGOCALCIFEROL) 50,000 unit Cap TAKE 1 CAPSULE EVERY 7 DAYS AS DIRECTED    fluticasone propionate (FLONASE) 50 mcg/actuation nasal spray 1 spray, Each Nostril, Daily    ketoconazole (NIZORAL) 2 % cream Topical (Top), Daily    meclizine (ANTIVERT) 12.5 mg, Oral, 3 times daily PRN    naproxen (NAPROSYN) 500 MG tablet TAKE 1 TABLET TWICE DAILY WITH FOOD FOR PAIN / INFLAMMATION    ondansetron (ZOFRAN-ODT) 4 mg, Oral, Every 6 hours PRN    pantoprazole (PROTONIX) 40 mg, Oral, Daily    paroxetine (PAXIL) 60 mg, Oral, Daily    rizatriptan (MAXALT) 10 MG tablet TAKE 1 TABLET DAILY AS NEEDED FOR HEADACHE    rOPINIRole (REQUIP) 1 mg, Oral, Nightly    topiramate (TOPAMAX) 25 mg, Oral, 2 times daily    traMADoL (ULTRAM) 50 mg, Oral, Every 6 hours PRN    traZODone (DESYREL) 150 MG tablet TAKE 1 TABLET AT BEDTIME AS NEEDED FOR INSOMNIA       Patient is allergic to penicillins, codeine, and prednisone.     Patient Care Team:  Brittany Hernandez FNP-C as PCP - General (Family Medicine)  Matthew Ferreira PMHNP as Nurse Practitioner (Psychiatry)  Han Ramírez MD as Consulting Physician (Hematology and Oncology)  Justin Beltrán MD (Dermatology)  Robetr Gonzalez MD as Consulting Physician (Urology)     Subjective     Review of Systems    See HPI     Objective     Visit Vitals  /82 (BP Location: Right forearm)   Pulse 64   Temp 98.1 °F (36.7 °C) (Temporal)   Ht 4' 11.02" (1.499 m)   Wt 77.1 kg " (170 lb)   SpO2 98%   BMI 34.31 kg/m²       Physical Exam  Vitals reviewed.   Constitutional:       Appearance: Normal appearance.   Cardiovascular:      Rate and Rhythm: Normal rate and regular rhythm.   Pulmonary:      Effort: Pulmonary effort is normal.      Breath sounds: Normal breath sounds.   Skin:     General: Skin is warm and dry.   Neurological:      Mental Status: She is alert and oriented to person, place, and time.   Psychiatric:         Mood and Affect: Mood normal.         Behavior: Behavior normal.         Assessment & Plan:     1. Hepatic cirrhosis, unspecified hepatic cirrhosis type, unspecified whether ascites present  Assessment & Plan:  Observed on CT abdomen pelvis in ER.  Labs ordered and referring to gastroenterology as soon as possible.   Given ER precautions for bleeding.       2. Esophageal varices in cirrhosis  Assessment & Plan:  Observed on CT abdomen pelvis in ER.  Labs ordered and referring to gastroenterology as soon as possible.   Given ER precautions for bleeding.     Orders:  -     Hepatitis B Surface Ab, Qualitative; Future; Expected date: 05/16/2024  -     Ambulatory referral/consult to Gastroenterology; Future; Expected date: 05/23/2024  -     Hepatitis C Viral(HCV) RNA, Quant Real-Time PCR w/Reflexs; Future; Expected date: 05/16/2024  -     Copper, Serum; Future; Expected date: 05/16/2024  -     Ceruloplasmin; Future; Expected date: 05/16/2024  -     Alpha 1 Antitrypsin Defiiciency Profile; Future; Expected date: 05/16/2024  -     Mitochondrial (M2) Antibody; Future; Expected date: 05/16/2024  -     Ferritin; Future; Expected date: 05/16/2024  -     Hepatitis B Surface Antigen; Future; Expected date: 05/16/2024  -     Hepatitis B Core Antibody, Total; Future; Expected date: 05/16/2024    3. Splenomegaly    4. Restless legs syndrome  Overview:  On requip 1 mg at bedtime.     Assessment & Plan:  Increase to bid.      Orders:  -     rOPINIRole (REQUIP) 1 MG tablet; Take 1 tablet  (1 mg total) by mouth every evening.  Dispense: 90 tablet; Refill: 1         Follow up in about 1 month (around 6/16/2024), or if symptoms worsen or fail to improve. In addition to their next scheduled appointment, the patient has also been instructed to follow up on as needed basis.     Future Appointments   Date Time Provider Department Center   7/15/2024 10:30 AM Matthew Ferreira PMHNP [facility] [place] [facility]   8/21/2024  8:20 AM LAB, [facility] LABORATORY DRAW STATION [facility] GR[facility] [place] [facility]   8/27/2024  1:30 PM Brittany Hernandez FNPCHRISSIE [facility]      JOLLY spent a total of 58 minutes on the day of the visit.  This includes face to face time and non-face to face time preparing to see the patient (eg, review of tests), obtaining and/or reviewing separately obtained history, documenting clinical information in the electronic or other health record, independently interpreting results and communicating results to the patient/family/caregiver, or care coordinator.      Taltz Counseling: I discussed with the patient the risks of ixekizumab including but not limited to immunosuppression, serious infections, worsening of inflammatory bowel disease and drug reactions.  The patient understands that monitoring is required including a PPD at baseline and must alert us or the primary physician if symptoms of infection or other concerning signs are noted.

## 2024-05-19 DIAGNOSIS — R06.02 SHORTNESS OF BREATH: Primary | ICD-10-CM

## 2024-05-20 ENCOUNTER — HOSPITAL ENCOUNTER (EMERGENCY)
Facility: HOSPITAL | Age: 64
Discharge: HOME | End: 2024-05-20
Attending: INTERNAL MEDICINE
Payer: MEDICARE

## 2024-05-20 ENCOUNTER — PATIENT MESSAGE (OUTPATIENT)
Dept: FAMILY MEDICINE | Facility: CLINIC | Age: 64
End: 2024-05-20
Payer: MEDICARE

## 2024-05-20 VITALS
SYSTOLIC BLOOD PRESSURE: 124 MMHG | TEMPERATURE: 98 F | DIASTOLIC BLOOD PRESSURE: 64 MMHG | OXYGEN SATURATION: 100 % | BODY MASS INDEX: 36.26 KG/M2 | RESPIRATION RATE: 19 BRPM | WEIGHT: 179.88 LBS | HEIGHT: 59 IN | HEART RATE: 62 BPM

## 2024-05-20 DIAGNOSIS — K74.60 HEPATIC CIRRHOSIS, UNSPECIFIED HEPATIC CIRRHOSIS TYPE, UNSPECIFIED WHETHER ASCITES PRESENT: ICD-10-CM

## 2024-05-20 DIAGNOSIS — K62.5 RECTAL BLEEDING: Primary | ICD-10-CM

## 2024-05-20 DIAGNOSIS — R10.13 EPIGASTRIC PAIN: ICD-10-CM

## 2024-05-20 LAB
ABO AND RH: NORMAL
ABORH RETYPE: NORMAL
ALBUMIN SERPL-MCNC: 3.9 G/DL (ref 3.4–5)
ALBUMIN/GLOB SERPL: 1.5 RATIO
ALP SERPL-CCNC: 79 UNIT/L (ref 50–144)
ALT SERPL-CCNC: 29 UNIT/L (ref 1–45)
AMMONIA PLAS-MSCNC: 17 UMOL/L (ref 11–32)
AMORPH PHOS CRY URNS QL MICRO: ABNORMAL /HPF
ANION GAP SERPL CALC-SCNC: 6 MEQ/L (ref 2–13)
ANTIBODY SCREEN: NORMAL
AST SERPL-CCNC: 40 UNIT/L (ref 14–36)
BACTERIA #/AREA URNS AUTO: ABNORMAL /HPF
BASOPHILS # BLD AUTO: 0.02 X10(3)/MCL (ref 0.01–0.08)
BASOPHILS NFR BLD AUTO: 0.7 % (ref 0.1–1.2)
BILIRUB SERPL-MCNC: 0.5 MG/DL (ref 0–1)
BILIRUB UR QL STRIP.AUTO: NEGATIVE
BNP BLD-MCNC: <20 PG/ML (ref 0–124.9)
BUN SERPL-MCNC: 16 MG/DL (ref 7–20)
CALCIUM SERPL-MCNC: 9.7 MG/DL (ref 8.4–10.2)
CHLORIDE SERPL-SCNC: 109 MMOL/L (ref 98–110)
CLARITY UR: CLEAR
CO2 SERPL-SCNC: 25 MMOL/L (ref 21–32)
COLOR UR AUTO: YELLOW
CREAT SERPL-MCNC: 0.72 MG/DL (ref 0.66–1.25)
CREAT/UREA NIT SERPL: 22 (ref 12–20)
EOSINOPHIL # BLD AUTO: 0.08 X10(3)/MCL (ref 0.04–0.36)
EOSINOPHIL NFR BLD AUTO: 3 % (ref 0.7–7)
ERYTHROCYTE [DISTWIDTH] IN BLOOD BY AUTOMATED COUNT: 13.5 % (ref 11–14.5)
GFR SERPLBLD CREATININE-BSD FMLA CKD-EPI: >90 ML/MIN/1.73/M2
GLOBULIN SER-MCNC: 2.6 GM/DL (ref 2–3.9)
GLUCOSE SERPL-MCNC: 89 MG/DL (ref 70–115)
GLUCOSE UR QL STRIP: NEGATIVE
HCT VFR BLD AUTO: 36.5 % (ref 36–48)
HGB BLD-MCNC: 12.4 G/DL (ref 11.8–16)
HGB UR QL STRIP: ABNORMAL
IMM GRANULOCYTES # BLD AUTO: 0.01 X10(3)/MCL (ref 0–0.03)
IMM GRANULOCYTES NFR BLD AUTO: 0.4 % (ref 0–0.5)
INR PPP: 1.1
KETONES UR QL STRIP: NEGATIVE
LEUKOCYTE ESTERASE UR QL STRIP: NEGATIVE
LIPASE SERPL-CCNC: 161 U/L (ref 23–300)
LYMPHOCYTES # BLD AUTO: 0.74 X10(3)/MCL (ref 1.16–3.74)
LYMPHOCYTES NFR BLD AUTO: 27.4 % (ref 20–55)
MCH RBC QN AUTO: 32.1 PG (ref 27–34)
MCHC RBC AUTO-ENTMCNC: 34 G/DL (ref 31–37)
MCV RBC AUTO: 94.6 FL (ref 79–99)
MONOCYTES # BLD AUTO: 0.2 X10(3)/MCL (ref 0.24–0.36)
MONOCYTES NFR BLD AUTO: 7.4 % (ref 4.7–12.5)
MUCOUS THREADS URNS QL MICRO: ABNORMAL /LPF
NEUTROPHILS # BLD AUTO: 1.65 X10(3)/MCL (ref 1.56–6.13)
NEUTROPHILS NFR BLD AUTO: 61.1 % (ref 37–73)
NITRITE UR QL STRIP: NEGATIVE
NRBC BLD AUTO-RTO: 0 %
OHS QRS DURATION: 80 MS
OHS QTC CALCULATION: 410 MS
PH UR STRIP: 6.5 [PH]
PLATELET # BLD AUTO: 83 X10(3)/MCL (ref 140–371)
PMV BLD AUTO: 10.4 FL (ref 9.4–12.4)
POTASSIUM SERPL-SCNC: 3.8 MMOL/L (ref 3.5–5.1)
PROT SERPL-MCNC: 6.5 GM/DL (ref 6.3–8.2)
PROT UR QL STRIP: NEGATIVE
PROTHROMBIN TIME: 11.5 SECONDS (ref 9.3–11.9)
RBC # BLD AUTO: 3.86 X10(6)/MCL (ref 4–5.1)
RBC #/AREA URNS AUTO: ABNORMAL /HPF
SODIUM SERPL-SCNC: 140 MMOL/L (ref 136–145)
SP GR UR STRIP.AUTO: 1.02 (ref 1–1.03)
SPECIMEN OUTDATE: NORMAL
SQUAMOUS #/AREA URNS AUTO: ABNORMAL /HPF
TROPONIN I SERPL-MCNC: <0.012 NG/ML (ref 0–0.03)
UROBILINOGEN UR STRIP-ACNC: 1
WBC # SPEC AUTO: 2.7 X10(3)/MCL (ref 4–11.5)

## 2024-05-20 PROCEDURE — 96374 THER/PROPH/DIAG INJ IV PUSH: CPT

## 2024-05-20 PROCEDURE — 25500020 PHARM REV CODE 255: Performed by: INTERNAL MEDICINE

## 2024-05-20 PROCEDURE — 80053 COMPREHEN METABOLIC PANEL: CPT | Performed by: INTERNAL MEDICINE

## 2024-05-20 PROCEDURE — 85025 COMPLETE CBC W/AUTO DIFF WBC: CPT | Performed by: INTERNAL MEDICINE

## 2024-05-20 PROCEDURE — 99285 EMERGENCY DEPT VISIT HI MDM: CPT | Mod: 25

## 2024-05-20 PROCEDURE — 93010 ELECTROCARDIOGRAM REPORT: CPT | Mod: ,,, | Performed by: INTERNAL MEDICINE

## 2024-05-20 PROCEDURE — 83880 ASSAY OF NATRIURETIC PEPTIDE: CPT | Performed by: INTERNAL MEDICINE

## 2024-05-20 PROCEDURE — 82140 ASSAY OF AMMONIA: CPT | Performed by: INTERNAL MEDICINE

## 2024-05-20 PROCEDURE — 63600175 PHARM REV CODE 636 W HCPCS: Performed by: INTERNAL MEDICINE

## 2024-05-20 PROCEDURE — 96375 TX/PRO/DX INJ NEW DRUG ADDON: CPT

## 2024-05-20 PROCEDURE — 83690 ASSAY OF LIPASE: CPT | Performed by: INTERNAL MEDICINE

## 2024-05-20 PROCEDURE — C9113 INJ PANTOPRAZOLE SODIUM, VIA: HCPCS | Performed by: INTERNAL MEDICINE

## 2024-05-20 PROCEDURE — 93005 ELECTROCARDIOGRAM TRACING: CPT

## 2024-05-20 PROCEDURE — 84484 ASSAY OF TROPONIN QUANT: CPT | Performed by: INTERNAL MEDICINE

## 2024-05-20 PROCEDURE — 81001 URINALYSIS AUTO W/SCOPE: CPT | Performed by: INTERNAL MEDICINE

## 2024-05-20 PROCEDURE — 86850 RBC ANTIBODY SCREEN: CPT | Performed by: INTERNAL MEDICINE

## 2024-05-20 PROCEDURE — 85610 PROTHROMBIN TIME: CPT | Performed by: INTERNAL MEDICINE

## 2024-05-20 RX ORDER — PANTOPRAZOLE SODIUM 40 MG/10ML
40 INJECTION, POWDER, LYOPHILIZED, FOR SOLUTION INTRAVENOUS
Status: COMPLETED | OUTPATIENT
Start: 2024-05-20 | End: 2024-05-20

## 2024-05-20 RX ORDER — ONDANSETRON 4 MG/1
4 TABLET, ORALLY DISINTEGRATING ORAL EVERY 6 HOURS PRN
Qty: 20 TABLET | Refills: 0 | Status: SHIPPED | OUTPATIENT
Start: 2024-05-20

## 2024-05-20 RX ORDER — ONDANSETRON HYDROCHLORIDE 2 MG/ML
4 INJECTION, SOLUTION INTRAVENOUS
Status: COMPLETED | OUTPATIENT
Start: 2024-05-20 | End: 2024-05-20

## 2024-05-20 RX ORDER — ALBUTEROL SULFATE 90 UG/1
AEROSOL, METERED RESPIRATORY (INHALATION)
Qty: 18 G | Refills: 2 | Status: SHIPPED | OUTPATIENT
Start: 2024-05-20

## 2024-05-20 RX ORDER — PANTOPRAZOLE SODIUM 40 MG/1
40 TABLET, DELAYED RELEASE ORAL DAILY
Qty: 15 TABLET | Refills: 0 | Status: SHIPPED | OUTPATIENT
Start: 2024-05-20 | End: 2024-05-29 | Stop reason: SDUPTHER

## 2024-05-20 RX ADMIN — IOHEXOL 90 ML: 300 INJECTION, SOLUTION INTRAVENOUS at 08:05

## 2024-05-20 RX ADMIN — ONDANSETRON 4 MG: 2 INJECTION INTRAMUSCULAR; INTRAVENOUS at 08:05

## 2024-05-20 RX ADMIN — PANTOPRAZOLE SODIUM 40 MG: 40 INJECTION, POWDER, LYOPHILIZED, FOR SOLUTION INTRAVENOUS at 08:05

## 2024-05-20 NOTE — ED PROVIDER NOTES
Encounter Date: 2024       History     Chief Complaint   Patient presents with    Rectal Bleeding     States history of esophageal varices, cirrhosis of liver. Noticed small amount of blood in stool. States was told by pcp to report to er if she ever passed any amount of blood.     This is a 63-year-old female patient came into the emergency room today with history of having black tarry stool and presence of some blood that she noted this morning.  Patient reports that recently she has been diagnosed with cirrhosis of the liver, esophageal varices, splenomegaly.  Patient states about 2 weeks ago she had low WBC count and low platelet count.  According to her was told that she needs to report to hospital if she ever noticed any loss of blood.  Patient denies any chest pain shortness of breath or palpitations.  Denies any nausea vomiting.  Reports having mild epigastric discomfort.  No flank pain.  No fever or chills.  Patient denies any intake of antiplatelets or anticoagulant agents currently.  No change in mental status.        Review of patient's allergies indicates:   Allergen Reactions    Penicillins     Codeine Rash    Prednisone Anxiety     Past Medical History:   Diagnosis Date    Anemia     Anxiety     Bipolar disorder     Calculus of kidney 2023    Chronic sinusitis, unspecified     Depression     Fatigue     GERD (gastroesophageal reflux disease)     Grief     Kidney stone     Left shoulder pain     Migraine     Mixed hyperlipidemia     Obesity, unspecified     Restless legs syndrome (RLS)     Trapezius muscle spasm     Vitamin D deficiency      Past Surgical History:   Procedure Laterality Date    CARPAL TUNNEL RELEASE      CHOLECYSTECTOMY      COLON BIOPSY      COLON SURGERY      HYSTERECTOMY      SMALL INTESTINE SURGERY  ?2019    Large tumor     Family History   Problem Relation Name Age of Onset    Hypothyroidism Mother Mell ()     Parkinsonism Mother Mell ()      Miscarriages / Stillbirths Mother Mell ()     Bipolar disorder Father Neptali     Depression Father Neptali     Coronary artery disease Father Neptali     Hypertension Father Neptali     Hypothyroidism Father Neptali     Heart disease Father Neptali     Mental illness Father Neptali     Hypertension Sister Radha     Hypothyroidism Sister Radha     Diabetes Sister Radha     No Known Problems Brother      No Known Problems Maternal Aunt      No Known Problems Paternal Aunt      No Known Problems Maternal Uncle      No Known Problems Paternal Uncle      No Known Problems Maternal Grandfather      No Known Problems Maternal Grandmother      Diabetes Paternal Grandfather Giselle Gage     Bipolar disorder Paternal Grandmother Digna Suarez     Depression Paternal Grandmother Digna Suarez     Mental illness Paternal Grandmother Digna Suarez     No Known Problems Cousin      No Known Problems Other       Social History     Tobacco Use    Smoking status: Never     Passive exposure: Never    Smokeless tobacco: Never   Substance Use Topics    Alcohol use: Never    Drug use: Never     Review of Systems   Constitutional:  Positive for fatigue. Negative for fever.   HENT:  Negative for ear pain, hearing loss, sinus pain and sneezing.    Eyes:  Negative for photophobia.   Respiratory:  Negative for cough and shortness of breath.    Cardiovascular:  Negative for chest pain.   Gastrointestinal:  Positive for abdominal pain, anal bleeding, blood in stool and nausea. Negative for vomiting.   Endocrine: Negative for polydipsia, polyphagia and polyuria.   Genitourinary:  Negative for dysuria, flank pain, frequency and hematuria.   Musculoskeletal:  Negative for myalgias and neck pain.   Skin:  Negative for rash.   Neurological:  Negative for syncope and headaches.   Psychiatric/Behavioral:  Negative for confusion.    All other systems reviewed and are negative.      Physical Exam     Initial Vitals [24 0742]   BP Pulse Resp Temp  SpO2   136/76 65 18 98 °F (36.7 °C) 97 %      MAP       --         Physical Exam    Nursing note and vitals reviewed.  Constitutional: She appears well-developed and well-nourished.   HENT:   Head: Normocephalic and atraumatic.   Eyes: Conjunctivae and EOM are normal.   Neck: Neck supple.   Normal range of motion.  Cardiovascular:  Normal rate, regular rhythm and normal heart sounds.           Pulmonary/Chest: Breath sounds normal.   Abdominal: Abdomen is soft. Bowel sounds are normal.   Musculoskeletal:         General: Normal range of motion.      Cervical back: Normal range of motion and neck supple.     Neurological: She is alert and oriented to person, place, and time. She has normal strength. GCS score is 15. GCS eye subscore is 4. GCS verbal subscore is 5. GCS motor subscore is 6.   Skin: Skin is warm and dry. Capillary refill takes less than 2 seconds.   Psychiatric: She has a normal mood and affect.         ED Course   Procedures  Labs Reviewed   COMPREHENSIVE METABOLIC PANEL - Abnormal; Notable for the following components:       Result Value    AST 40 (*)     BUN/Creatinine Ratio 22 (*)     All other components within normal limits   URINALYSIS, REFLEX TO URINE CULTURE - Abnormal; Notable for the following components:    Blood, UA Trace-Intact (*)     All other components within normal limits    Narrative:      URINE STABILITY IS 2 HOURS AT ROOM TEMP OR    SIX HOURS REFRIGERATED. PERFORMING TESTING ON    SPECIMENS GREATER THAN THIS AGE MAY AFFECT THE    FOLLOWING TESTS:    PH          SPECIFIC GRAVITY           BLOOD    CLARITY     BILIRUBIN               UROBILINOGEN   CBC WITH DIFFERENTIAL - Abnormal; Notable for the following components:    WBC 2.70 (*)     RBC 3.86 (*)     Platelet 83 (*)     Lymph # 0.74 (*)     Mono # 0.20 (*)     All other components within normal limits   URINALYSIS, MICROSCOPIC - Abnormal; Notable for the following components:    Mucous, UA Small (*)     Amorphous Phosphate  Crystals, UA Moderate (*)     Squamous Epithelial Cells, UA Few (*)     All other components within normal limits   AMMONIA - Normal   LIPASE - Normal   TROPONIN I - Normal   NT-PRO NATRIURETIC PEPTIDE - Normal   PROTIME-INR - Normal    Narrative:     Protimes are used to monitor anticoagulant agents such as warfarin. PT INR values are based on the current patient normal mean and the JEANIE value for the specific instrument reagent used.  **Routine theraputic target values for the INR are 2.0-3.0**   CBC W/ AUTO DIFFERENTIAL    Narrative:     The following orders were created for panel order CBC W/ AUTO DIFFERENTIAL.  Procedure                               Abnormality         Status                     ---------                               -----------         ------                     CBC with Differential[[id number]]       Abnormal            Final result                 Please view results for these tests on the individual orders.   OCCULT BLOOD, STOOL 1ST SPECIMEN   TYPE & SCREEN   ABORH RETYPE     EKG Readings: (Independently Interpreted)   EKG shows sinus bradycardia with heart rate of 57 beats per minute, MO interval 156 milliseconds, QRS duration 80,  milliseconds.  No ST depressions or ST elevations       Imaging Results              CT Abdomen Pelvis With IV Contrast NO Oral Contrast (Final result)  Result time 05/20/24 09:09:30      Final result by Jerrod Hilario III, MD (05/20/24 09:09:30)                   Impression:      1. Stable changes compatible with cirrhosis and related stigmata of cirrhosis as described above under liver and spleen and as seen on previous imaging (05/04/2024).  See above comments.  2. Bilateral, nonobstructing nephrolithiasis as seen on prior imaging (05/04/2024).  3. Additional chronic changes as described above.      Electronically signed by: Jerrod Hilario  Date:    05/20/2024  Time:    09:09               Narrative:    EXAMINATION:  CT ABDOMEN PELVIS WITH IV  CONTRAST    CLINICAL HISTORY AND TECHNIQUE:  Cirrhosis, epigastric pain    This patient has had 3 CT and nuclear medicine scans performed within the last 12 months.    The following DOSE REDUCTION TECHNIQUES are used for all CT scans at [facility]:    1. Automated exposure control.  2. Adjustment of the mA and/or kv according to patient size.  3. Use of iterative reconstruction technique.    COMPARISON:  05/04/2024    FINDINGS:  Liver: The liver is moderately atrophic compatible with patient's history of cirrhosis.  No worrisome focal hepatic abnormalities are noted.    Gallbladder/biliary system: Previous cholecystectomy.    Spleen: The spleen is enlarged with extensive venous varicosities noted adjacent to the distal esophagus gastric lumen and splenic hilum compatible with changes related to cirrhosis.    Adrenal glands: No clinically significant abnormalities are noted.    Pancreas: No clinically significant abnormalities are noted.    Kidneys/ureters: A subcentimeter, benign-appearing cortical cyst is noted within the midpole of the right kidney.  Nonobstructing nephrolithiasis are noted bilaterally as seen on the prior examination of 05/04/2024.  I see no obvious ureteral stones or changes to suggest ureteral obstruction.    Urinary bladder: The urinary bladder is less than optimally distended with no gross abnormalities noted.    Uterus and ovaries: The patient is post hysterectomy.    GI tract: Unopacified loops of large and small bowel as well as the gastric lumen are difficult to evaluate with no worrisome abnormalities appreciated.  The appendix is not clearly delineated although there are no secondary changes to suggest appendicitis.    Vascular structures: No clinically significant abnormalities are noted.    Musculoskeletal structures: A mild-to-moderate, dextroconvex, scoliotic curvature of the thoracolumbar spine is present with moderate degenerative changes noted involving the  lumbar spine.    Miscellaneous: N/A                                    X-Rays:   Independently Interpreted Readings:   Other Readings:  CT abdomen and pelvis shows:Impression:     1. Stable changes compatible with cirrhosis and related stigmata of cirrhosis as described above under liver and spleen and as seen on previous imaging (05/04/2024).  See above comments.  2. Bilateral, nonobstructing nephrolithiasis as seen on prior imaging (05/04/2024).  3. Additional chronic changes as described above.         Medications   ondansetron injection 4 mg (4 mg Intravenous Given 5/20/24 0839)   pantoprazole injection 40 mg (40 mg Intravenous Given 5/20/24 0839)   iohexoL (OMNIPAQUE 300) injection 90 mL (90 mLs Intravenous Given 5/20/24 0851)     Medical Decision Making  This is a 63-year-old female patient came into the emergency room today with history of having black tarry stool and presence of some blood that she noted this morning.  Patient reports that recently she has been diagnosed with cirrhosis of the liver, esophageal varices, splenomegaly.  Patient states about 2 weeks ago she had low WBC count and low platelet count.  According to her was told that she needs to report to hospital if she ever noticed any loss of blood.  Patient denies any chest pain shortness of breath or palpitations.  Denies any nausea vomiting.  Reports having mild epigastric discomfort.  No flank pain.  No fever or chills.  Patient denies any intake of antiplatelets or anticoagulant agents currently.  No change in mental status.    Patient's lab results like hemoglobin, PT INR, ammonia, liver function tests are normal.    CT abdomen and pelvis shows no acute findings.  Previously seen evidence for cirrhosis is noted again.  Advised the patient to follow up with gastroenterologist as an outpatient.  Advised to return to emergency room if symptoms worsen.    Differential diagnosis:  1. Cirrhosis 2.  Esophageal varices 3. Splenomegaly 4. Melena 5.   Av malformation 6.  Colon cancer    Amount and/or Complexity of Data Reviewed  Labs: ordered.  Radiology: ordered.    Risk  Prescription drug management.                                      Clinical Impression:  Final diagnoses:  [R10.13] Epigastric pain  [K62.5] Rectal bleeding (Primary)  [K74.60] Hepatic cirrhosis, unspecified hepatic cirrhosis type, unspecified whether ascites present          ED Disposition Condition    Discharge Stable          ED Prescriptions       Medication Sig Dispense Start Date End Date Auth. Provider    ondansetron (ZOFRAN-ODT) 4 MG TbDL Take 1 tablet (4 mg total) by mouth every 6 (six) hours as needed (Nausea and vomitings). 20 tablet 5/20/2024 -- Ashvin King DO    pantoprazole (PROTONIX) 40 MG tablet Take 1 tablet (40 mg total) by mouth once daily. for 15 days 15 tablet 5/20/2024 6/4/2024 Ashvin King DO          Follow-up Information       Follow up With Specialties Details Why Contact Info    Follow up with Gastroenterologist for further evaluation                 Ashvin King DO  05/20/24 1595

## 2024-05-22 ENCOUNTER — PATIENT MESSAGE (OUTPATIENT)
Dept: FAMILY MEDICINE | Facility: CLINIC | Age: 64
End: 2024-05-22
Payer: MEDICARE

## 2024-05-28 DIAGNOSIS — E55.9 VITAMIN D DEFICIENCY: Primary | ICD-10-CM

## 2024-05-28 RX ORDER — ERGOCALCIFEROL 1.25 MG/1
CAPSULE ORAL
Qty: 12 CAPSULE | Refills: 3 | Status: SHIPPED | OUTPATIENT
Start: 2024-05-28

## 2024-05-29 ENCOUNTER — PATIENT MESSAGE (OUTPATIENT)
Dept: FAMILY MEDICINE | Facility: CLINIC | Age: 64
End: 2024-05-29
Payer: MEDICARE

## 2024-05-29 DIAGNOSIS — K74.60 ESOPHAGEAL VARICES IN CIRRHOSIS: Primary | ICD-10-CM

## 2024-05-29 DIAGNOSIS — I85.10 ESOPHAGEAL VARICES IN CIRRHOSIS: Primary | ICD-10-CM

## 2024-05-29 RX ORDER — PANTOPRAZOLE SODIUM 40 MG/1
40 TABLET, DELAYED RELEASE ORAL DAILY
Qty: 30 TABLET | Refills: 5 | Status: SHIPPED | OUTPATIENT
Start: 2024-05-29 | End: 2024-06-13

## 2024-06-03 PROBLEM — R16.1 SPLENOMEGALY: Status: ACTIVE | Noted: 2024-06-03

## 2024-06-03 PROBLEM — I85.10 ESOPHAGEAL VARICES IN CIRRHOSIS: Status: ACTIVE | Noted: 2024-06-03

## 2024-06-03 PROBLEM — K74.60 HEPATIC CIRRHOSIS: Status: ACTIVE | Noted: 2024-06-03

## 2024-06-03 NOTE — ASSESSMENT & PLAN NOTE
Observed on CT abdomen pelvis in ER.  Labs ordered and referring to gastroenterology as soon as possible.   Given ER precautions for bleeding.

## 2024-06-04 ENCOUNTER — TELEPHONE (OUTPATIENT)
Dept: FAMILY MEDICINE | Facility: CLINIC | Age: 64
End: 2024-06-04

## 2024-06-04 ENCOUNTER — OFFICE VISIT (OUTPATIENT)
Dept: FAMILY MEDICINE | Facility: CLINIC | Age: 64
End: 2024-06-04
Payer: MEDICARE

## 2024-06-04 VITALS
DIASTOLIC BLOOD PRESSURE: 78 MMHG | TEMPERATURE: 99 F | OXYGEN SATURATION: 97 % | WEIGHT: 175.63 LBS | HEIGHT: 59 IN | HEART RATE: 62 BPM | SYSTOLIC BLOOD PRESSURE: 136 MMHG | BODY MASS INDEX: 35.4 KG/M2

## 2024-06-04 DIAGNOSIS — H60.90 OTITIS EXTERNA, UNSPECIFIED CHRONICITY, UNSPECIFIED LATERALITY, UNSPECIFIED TYPE: Primary | ICD-10-CM

## 2024-06-04 PROCEDURE — 1160F RVW MEDS BY RX/DR IN RCRD: CPT | Mod: ,,, | Performed by: NURSE PRACTITIONER

## 2024-06-04 PROCEDURE — 3075F SYST BP GE 130 - 139MM HG: CPT | Mod: ,,, | Performed by: NURSE PRACTITIONER

## 2024-06-04 PROCEDURE — 69209 REMOVE IMPACTED EAR WAX UNI: CPT | Mod: 52,RT,, | Performed by: NURSE PRACTITIONER

## 2024-06-04 PROCEDURE — 3008F BODY MASS INDEX DOCD: CPT | Mod: ,,, | Performed by: NURSE PRACTITIONER

## 2024-06-04 PROCEDURE — 3078F DIAST BP <80 MM HG: CPT | Mod: ,,, | Performed by: NURSE PRACTITIONER

## 2024-06-04 PROCEDURE — 99214 OFFICE O/P EST MOD 30 MIN: CPT | Mod: 25,,, | Performed by: NURSE PRACTITIONER

## 2024-06-04 PROCEDURE — 1159F MED LIST DOCD IN RCRD: CPT | Mod: ,,, | Performed by: NURSE PRACTITIONER

## 2024-06-04 RX ORDER — OFLOXACIN 3 MG/ML
10 SOLUTION AURICULAR (OTIC) DAILY
Qty: 10 ML | Refills: 1 | Status: SHIPPED | OUTPATIENT
Start: 2024-06-04 | End: 2024-06-11

## 2024-06-04 NOTE — PROGRESS NOTES
Patient ID: [id number]     Chief Complaint: Hearing Problem      HPI:     Sophy Iyer is a 63 y.o. female in the office for Hearing Problem  Started a few days ago.  Left ear feels stopped up, can't hear much.    A little pain and occasional dizziness normally with position changes.  Hearing Screening    500Hz 1000Hz 2000Hz 4000Hz   Right ear 15 15 20 25   Left ear 25 30 35 15       Past Medical History:  has a past medical history of Anemia, Anxiety, Bipolar disorder, Calculus of kidney, Chronic sinusitis, unspecified, Depression, Fatigue, GERD (gastroesophageal reflux disease), Grief, Kidney stone, Left shoulder pain, Migraine, Mixed hyperlipidemia, Obesity, unspecified, Restless legs syndrome (RLS), Trapezius muscle spasm, and Vitamin D deficiency.    Social History:  reports that she has never smoked. She has never been exposed to tobacco smoke. She has never used smokeless tobacco. She reports that she does not drink alcohol and does not use drugs.    Current Outpatient Medications   Medication Instructions    albuterol (PROVENTIL/VENTOLIN HFA) 90 mcg/actuation inhaler INHALE 2 PUFFS EVERY 4 HOURS FOR SHORTNESS OF BREATH OR WHEEZING -- SHAKE WELL BEFORE USE    atorvastatin (LIPITOR) 40 mg, Oral, Nightly    carBAMazepine (TEGRETOL) 200 mg, Oral, 2 times daily    ergocalciferol (ERGOCALCIFEROL) 50,000 unit Cap TAKE 1 CAPSULE EVERY 7 DAYS AS DIRECTED    fluticasone propionate (FLONASE) 50 mcg/actuation nasal spray 1 spray, Each Nostril, Daily    ketoconazole (NIZORAL) 2 % cream Topical (Top), Daily    meclizine (ANTIVERT) 12.5 mg, Oral, 3 times daily PRN    naproxen (NAPROSYN) 500 MG tablet TAKE 1 TABLET TWICE DAILY WITH FOOD FOR PAIN / INFLAMMATION    ofloxacin (FLOXIN) 0.3 % otic solution 10 drops, Left Ear, Daily    ondansetron (ZOFRAN-ODT) 4 mg, Oral, Every 6 hours PRN    pantoprazole (PROTONIX) 40 mg, Oral, Daily    paroxetine (PAXIL) 60 mg, Oral, Daily    rizatriptan (MAXALT) 10 MG tablet TAKE 1 TABLET  "DAILY AS NEEDED FOR HEADACHE    rOPINIRole (REQUIP) 1 mg, Oral, Nightly    topiramate (TOPAMAX) 25 mg, Oral, 2 times daily    traMADoL (ULTRAM) 50 mg, Oral, Every 6 hours PRN    traZODone (DESYREL) 150 MG tablet TAKE 1 TABLET AT BEDTIME AS NEEDED FOR INSOMNIA       Patient is allergic to penicillins, codeine, and prednisone.     Patient Care Team:  Brittany Hernandez FNP-C as PCP - General (Family Medicine)  Matthew Ferreira PMHNP as Nurse Practitioner (Psychiatry)  Han Ramírez MD as Consulting Physician (Hematology and Oncology)  Justin Beltrán MD (Dermatology)  Robert Gonzalez MD as Consulting Physician (Urology)     Subjective     Review of Systems    See HPI     Objective     Visit Vitals  /78 (BP Location: Left arm, Patient Position: Sitting, BP Method: Medium (Manual))   Pulse 62   Temp 98.8 °F (37.1 °C) (Oral)   Ht 4' 11.02" (1.499 m)   Wt 79.7 kg (175 lb 9.6 oz)   SpO2 97%   BMI 35.45 kg/m²       Physical Exam  Vitals reviewed.   Constitutional:       Appearance: Normal appearance.   HENT:      Right Ear: Decreased hearing noted. There is impacted cerumen.      Left Ear: No decreased hearing noted. There is no impacted cerumen.   Cardiovascular:      Rate and Rhythm: Normal rate and regular rhythm.      Heart sounds: Normal heart sounds.   Pulmonary:      Effort: Pulmonary effort is normal.      Breath sounds: Normal breath sounds.   Skin:     General: Skin is warm and dry.   Neurological:      Mental Status: She is alert and oriented to person, place, and time.   Psychiatric:         Mood and Affect: Mood normal.     Left ear flushed, incomplete removal.  Assessment & Plan:     1. Otitis externa, unspecified chronicity, unspecified laterality, unspecified type  -     ofloxacin (FLOXIN) 0.3 % otic solution; Place 10 drops into the left ear once daily. for 7 days  Dispense: 10 mL; Refill: 1    RTC 1 week for ear flush     No follow-ups on file. In addition to their next scheduled " appointment, the patient has also been instructed to follow up on as needed basis.     Future Appointments   Date Time Provider Department Center   6/13/2024  1:45 PM Brittany Hernandez FNP-C [facility] ADAM Galarza [facility]   7/15/2024 10:30 AM Matthew Ferreira [facility] Michell [facility]   8/21/2024  8:20 AM LAB, [facility] LABORATORY DRAW STATION [facility] GRADYDS [facility]   8/27/2024  1:30 PM [facility]    I spent a total of 30 minutes on the day of the visit.This includes face to face time and non-face to face time preparing to see the patient (eg, review of tests), obtaining and/or reviewing separately obtained history, documenting clinical information in the electronic or other health record, independently interpreting results and communicating results to the patient/family/caregiver, or care coordinator.

## 2024-06-11 ENCOUNTER — TELEPHONE (OUTPATIENT)
Dept: FAMILY MEDICINE | Facility: CLINIC | Age: 64
End: 2024-06-11
Payer: MEDICARE

## 2024-06-11 NOTE — TELEPHONE ENCOUNTER
Finally got through to the gastro doctor.  Patient is already established with them and all she has to do is call to schedule the appointment.  They asked for recent labs to be sent over.  She seen Korin last in 2017.    Call patient and explained this to her, she can call and schedule.  Exlpained to her step by step how to get through their automate system.

## 2024-06-11 NOTE — TELEPHONE ENCOUNTER
Patient called nad sat that she has passed a large amount of blood and has saved it.  Wants to know what she needs to do? I told her to put it on the icebox for the time being.

## 2024-06-28 ENCOUNTER — PATIENT MESSAGE (OUTPATIENT)
Dept: FAMILY MEDICINE | Facility: CLINIC | Age: 64
End: 2024-06-28
Payer: MEDICARE

## 2024-07-06 ENCOUNTER — PATIENT MESSAGE (OUTPATIENT)
Dept: BEHAVIORAL HEALTH | Facility: CLINIC | Age: 64
End: 2024-07-06
Payer: MEDICARE

## 2024-07-08 ENCOUNTER — PATIENT MESSAGE (OUTPATIENT)
Dept: BEHAVIORAL HEALTH | Facility: CLINIC | Age: 64
End: 2024-07-08
Payer: MEDICARE

## 2024-07-15 ENCOUNTER — OFFICE VISIT (OUTPATIENT)
Dept: BEHAVIORAL HEALTH | Facility: CLINIC | Age: 64
End: 2024-07-15
Payer: MEDICARE

## 2024-07-15 VITALS — BODY MASS INDEX: 35.42 KG/M2 | HEIGHT: 59 IN | WEIGHT: 175.69 LBS

## 2024-07-15 DIAGNOSIS — G25.81 RESTLESS LEGS SYNDROME: ICD-10-CM

## 2024-07-15 DIAGNOSIS — G47.00 INSOMNIA, UNSPECIFIED TYPE: ICD-10-CM

## 2024-07-15 DIAGNOSIS — F41.1 GENERALIZED ANXIETY DISORDER: ICD-10-CM

## 2024-07-15 DIAGNOSIS — F31.9 BIPOLAR AFFECTIVE DISORDER, REMISSION STATUS UNSPECIFIED: Primary | ICD-10-CM

## 2024-07-15 PROCEDURE — 99214 OFFICE O/P EST MOD 30 MIN: CPT | Mod: 95,,, | Performed by: NURSE PRACTITIONER

## 2024-07-15 PROCEDURE — 1160F RVW MEDS BY RX/DR IN RCRD: CPT | Mod: 95,,, | Performed by: NURSE PRACTITIONER

## 2024-07-15 PROCEDURE — 1159F MED LIST DOCD IN RCRD: CPT | Mod: 95,,, | Performed by: NURSE PRACTITIONER

## 2024-07-15 PROCEDURE — 3008F BODY MASS INDEX DOCD: CPT | Mod: 95,,, | Performed by: NURSE PRACTITIONER

## 2024-07-15 RX ORDER — TRAZODONE HYDROCHLORIDE 150 MG/1
TABLET ORAL
Qty: 90 TABLET | Refills: 1 | Status: SHIPPED | OUTPATIENT
Start: 2024-07-15

## 2024-07-15 RX ORDER — CARBAMAZEPINE 200 MG/1
200 TABLET ORAL 2 TIMES DAILY
Qty: 60 TABLET | Refills: 1 | Status: SHIPPED | OUTPATIENT
Start: 2024-07-15

## 2024-07-15 RX ORDER — PAROXETINE 30 MG/1
60 TABLET, FILM COATED ORAL DAILY
Qty: 60 TABLET | Refills: 1 | Status: SHIPPED | OUTPATIENT
Start: 2024-07-15

## 2024-07-17 ENCOUNTER — PATIENT MESSAGE (OUTPATIENT)
Dept: FAMILY MEDICINE | Facility: CLINIC | Age: 64
End: 2024-07-17
Payer: MEDICARE

## 2024-07-17 NOTE — TELEPHONE ENCOUNTER
Spoke with patient, she should be have 2-3 bowel movements a day per Dr. Hall.  She tried to educate her about the Bms removing the amonia level and that was the goal of the multiply Bms.  Spoke with Dr. Hall nurse the lactulose was not called out but they had sent the colonoscopy prep which she took already.  They will send another one out.

## 2024-07-19 ENCOUNTER — OFFICE VISIT (OUTPATIENT)
Dept: HEPATOLOGY | Facility: CLINIC | Age: 64
End: 2024-07-19
Payer: MEDICARE

## 2024-07-19 DIAGNOSIS — K74.60 HEPATIC CIRRHOSIS, UNSPECIFIED HEPATIC CIRRHOSIS TYPE, UNSPECIFIED WHETHER ASCITES PRESENT: Primary | ICD-10-CM

## 2024-07-19 DIAGNOSIS — I86.8 INTRA-ABDOMINAL VARICES: ICD-10-CM

## 2024-07-19 DIAGNOSIS — I85.10 ESOPHAGEAL VARICES IN CIRRHOSIS: ICD-10-CM

## 2024-07-19 DIAGNOSIS — F31.9 BIPOLAR AFFECTIVE DISORDER, REMISSION STATUS UNSPECIFIED: ICD-10-CM

## 2024-07-19 DIAGNOSIS — R97.8 OTHER ABNORMAL TUMOR MARKERS: ICD-10-CM

## 2024-07-19 DIAGNOSIS — K74.60 ESOPHAGEAL VARICES IN CIRRHOSIS: ICD-10-CM

## 2024-07-19 PROCEDURE — 1159F MED LIST DOCD IN RCRD: CPT | Mod: CPTII,95,, | Performed by: INTERNAL MEDICINE

## 2024-07-19 PROCEDURE — 99203 OFFICE O/P NEW LOW 30 MIN: CPT | Mod: 95,,, | Performed by: INTERNAL MEDICINE

## 2024-07-19 PROCEDURE — 1160F RVW MEDS BY RX/DR IN RCRD: CPT | Mod: CPTII,95,, | Performed by: INTERNAL MEDICINE

## 2024-07-19 NOTE — PATIENT INSTRUCTIONS
EGD and colonoscopy as scheduled  Memory problems- ammonia normal- lactulose prescribed; recheck ammonia  No fluid retention - but start <2000 mg salt per day  Labs now and every 3 months  MRI abdo w wo contrast to look for cancer; ct chest; check afp, CA 19-9,   Up to date with mammogram; needs pap and colonoscopy is scheduled  Return 3 months

## 2024-07-19 NOTE — PROGRESS NOTES
"The patient location is: home  The chief complaint leading to consultation is: cirrhosis    Visit type: audiovisual    Face to Face time with patient: 20 minutes  30 minutes of total time spent on the encounter, which includes face to face time and non-face to face time preparing to see the patient (eg, review of tests), Obtaining and/or reviewing separately obtained history, Documenting clinical information in the electronic or other health record, Independently interpreting results (not separately reported) and communicating results to the patient/family/caregiver, or Care coordination (not separately reported).         Each patient to whom he or she provides medical services by telemedicine is:  (1) informed of the relationship between the physician and patient and the respective role of any other health care provider with respect to management of the patient; and (2) notified that he or she may decline to receive medical services by telemedicine and may withdraw from such care at any time.    Notes: HEPATOLOGY CONSULTATION    Referring Physician: Brittany Hernandez FNP-C   Current Corresponding Physician: Brittany Hernandez FNP-C     Reason for Consultation: Consultation for evaluation of Cirrhosis    History of Present Illness: Sophy Iyer is a 63 y.o. female who presents for evaluation of cirrhosis noted on abdominal imaging    --saw local GI in [place] 6/24 after ER visit for RUQ pain and nausea and BRBPR but no admission to the hospital and no blood transfusion  --suspected esophageal varices (intra-abdominal varices seen on ct scan)-has not had EGD yet  --sent to hematologist for low "counts" including platelets-no bone marrow done  --unintentional weight loss of 40 lbs since 01/24  --no ascites or edema  --+++memory problems: lactulose prescribed (has not yet gotten it); of note ammonia level was checked and was only 17  --prgiven golytely instead of lactulose initially- +++loose stools- had to take " imodium    --~6 hosp for bipolar during her life: including 3 suicide attempts (last hosp) 2006; is on disabiity    Labs 5/20/24: ALT 29, AST 40, ALKP 79, Tbil 0.5. plts  Alcohol: no significant alcohol  BMI: 7/15/24 35 (213 lbs one year ago)-losing weight due to loss of appetite    CT abdo pelvis w IV contrast 5/20/24: cirrhosis; splenomegaly; no HCC; no cause for pain found  CT renal stone study 5/4/24: Gallbladder is surgically absent. The spleen is enlarged with innumerable periesophageal and perigastric varices     MELD 3.0: 8 at 5/20/2024  8:19 AM  MELD-Na: 7 at 5/20/2024  8:19 AM  Calculated from:  Serum Creatinine: 0.72 mg/dL (Using min of 1 mg/dL) at 5/20/2024  8:19 AM  Serum Sodium: 140 mmol/L (Using max of 137 mmol/L) at 5/20/2024  8:19 AM  Total Bilirubin: 0.5 mg/dL (Using min of 1 mg/dL) at 5/20/2024  8:19 AM  Serum Albumin: 3.9 g/dL (Using max of 3.5 g/dL) at 5/20/2024  8:19 AM  INR(ratio): 1.1 at 5/20/2024  8:19 AM  Age at listing (hypothetical): 63 years  Sex: Female at 5/20/2024  8:19 AM       Chief Complaint   Patient presents with    Cirrhosis       Past Medical History:   Diagnosis Date    Anemia     Anxiety     Bipolar disorder     Calculus of kidney 02/22/2023    Chronic sinusitis, unspecified     Cirrhosis 07/19/2024    Depression     Fatigue     GERD (gastroesophageal reflux disease)     Grief     Kidney stone     Left shoulder pain     Migraine     Mixed hyperlipidemia     Obesity, unspecified     Restless legs syndrome (RLS)     Trapezius muscle spasm     Vitamin D deficiency      Outpatient Encounter Medications as of 7/19/2024   Medication Sig Dispense Refill    albuterol (PROVENTIL/VENTOLIN HFA) 90 mcg/actuation inhaler INHALE 2 PUFFS EVERY 4 HOURS FOR SHORTNESS OF BREATH OR WHEEZING -- SHAKE WELL BEFORE USE 18 g 2    atorvastatin (LIPITOR) 40 MG tablet Take 1 tablet (40 mg total) by mouth every evening. 90 tablet 3    carBAMazepine (TEGRETOL) 200 mg tablet Take 1 tablet (200 mg total)  by mouth 2 (two) times daily. 60 tablet 1    ergocalciferol (ERGOCALCIFEROL) 50,000 unit Cap TAKE 1 CAPSULE EVERY 7 DAYS AS DIRECTED 12 capsule 3    fluticasone propionate (FLONASE) 50 mcg/actuation nasal spray 1 spray by Each Nostril route Daily.      ketoconazole (NIZORAL) 2 % cream Apply topically once daily. 60 g 3    meclizine (ANTIVERT) 12.5 mg tablet Take 1 tablet (12.5 mg total) by mouth 3 (three) times daily as needed for Dizziness or Nausea. 30 tablet 1    naproxen (NAPROSYN) 500 MG tablet TAKE 1 TABLET TWICE DAILY WITH FOOD FOR PAIN / INFLAMMATION      ondansetron (ZOFRAN-ODT) 4 MG TbDL Take 1 tablet (4 mg total) by mouth every 6 (six) hours as needed (Nausea and vomitings). 20 tablet 0    paroxetine (PAXIL) 30 MG tablet Take 2 tablets (60 mg total) by mouth once daily. 60 tablet 1    rizatriptan (MAXALT) 10 MG tablet TAKE 1 TABLET DAILY AS NEEDED FOR HEADACHE 30 tablet 5    rOPINIRole (REQUIP) 1 MG tablet Take 1 tablet (1 mg total) by mouth every evening. 90 tablet 1    topiramate (TOPAMAX) 25 MG tablet Take 1 tablet (25 mg total) by mouth 2 (two) times daily. 180 tablet 3    traZODone (DESYREL) 150 MG tablet TAKE 1 TABLET AT BEDTIME AS NEEDED FOR INSOMNIA 90 tablet 1    pantoprazole (PROTONIX) 40 MG tablet Take 1 tablet (40 mg total) by mouth once daily. for 15 days 30 tablet 05     No facility-administered encounter medications on file as of 2024.     Review of patient's allergies indicates:   Allergen Reactions    Penicillins     Codeine Rash    Prednisone Anxiety     Family History   Problem Relation Name Age of Onset    Hypothyroidism Mother Mell ()     Parkinsonism Mother Mell ()     Miscarriages / Stillbirths Mother Mell ()     Bipolar disorder Father Neptali     Depression Father Neptali     Coronary artery disease Father Neptali     Hypertension Father Neptali     Hypothyroidism Father Neptali     Heart disease Father Neptali     Mental illness Father Neptali      Hypertension Sister Radha     Hypothyroidism Sister Radha     Diabetes Sister Radha     No Known Problems Brother      No Known Problems Maternal Aunt      No Known Problems Paternal Aunt      No Known Problems Maternal Uncle      No Known Problems Paternal Uncle      No Known Problems Maternal Grandfather      No Known Problems Maternal Grandmother      Diabetes Paternal Grandfather Giselle Gage     Bipolar disorder Paternal Grandmother Digna Suarez     Depression Paternal Grandmother Digna Suarez     Mental illness Paternal Grandmother Digna Suarez     No Known Problems Cousin      No Known Problems Other         Social History     Socioeconomic History    Marital status: Single   Tobacco Use    Smoking status: Never     Passive exposure: Never    Smokeless tobacco: Never   Substance and Sexual Activity    Alcohol use: Never    Drug use: Never    Sexual activity: Not Currently     Partners: Male     Birth control/protection: Abstinence, Coitus interruptus, Condom, Post-menopausal, See Surgical Hx, Other-see comments     Comment: Hysterectomy     Social Determinants of Health     Financial Resource Strain: Low Risk  (12/13/2023)    Overall Financial Resource Strain (CARDIA)     Difficulty of Paying Living Expenses: Not very hard   Food Insecurity: No Food Insecurity (12/13/2023)    Hunger Vital Sign     Worried About Running Out of Food in the Last Year: Never true     Ran Out of Food in the Last Year: Never true   Transportation Needs: Unmet Transportation Needs (12/13/2023)    PRAPARE - Transportation     Lack of Transportation (Medical): Yes     Lack of Transportation (Non-Medical): Yes   Physical Activity: Insufficiently Active (12/13/2023)    Exercise Vital Sign     Days of Exercise per Week: 2 days     Minutes of Exercise per Session: 10 min   Stress: No Stress Concern Present (12/13/2023)    [employer] - Occupational Stress Questionnaire     Feeling of Stress : Only a little    Housing Stability: High Risk (12/13/2023)    Housing Stability Vital Sign     Unable to Pay for Housing in the Last Year: Yes     Number of Places Lived in the Last Year: 1     Unstable Housing in the Last Year: No     Review of Systems   Constitutional: Negative.    HENT: Negative.     Eyes: Negative.    Respiratory: Negative.     Cardiovascular: Negative.    Gastrointestinal: Negative.    Genitourinary: Negative.    Musculoskeletal: Negative.    Skin: Negative.    Neurological: Negative.    Psychiatric/Behavioral: Negative.       There were no vitals filed for this visit.    Physical Exam    MELD 3.0: 8 at 5/20/2024  8:19 AM  MELD-Na: 7 at 5/20/2024  8:19 AM  Calculated from:  Serum Creatinine: 0.72 mg/dL (Using min of 1 mg/dL) at 5/20/2024  8:19 AM  Serum Sodium: 140 mmol/L (Using max of 137 mmol/L) at 5/20/2024  8:19 AM  Total Bilirubin: 0.5 mg/dL (Using min of 1 mg/dL) at 5/20/2024  8:19 AM  Serum Albumin: 3.9 g/dL (Using max of 3.5 g/dL) at 5/20/2024  8:19 AM  INR(ratio): 1.1 at 5/20/2024  8:19 AM  Age at listing (hypothetical): 63 years  Sex: Female at 5/20/2024  8:19 AM      Lab Results   Component Value Date    BUN 16 05/20/2024    CREATININE 0.72 05/20/2024    CALCIUM 9.7 05/20/2024     05/20/2024    K 3.8 05/20/2024     05/20/2024    CO2 25 05/20/2024    WBC 2.70 (L) 05/20/2024    WBC 3.1 (L) 02/19/2024    RBC 3.86 (L) 05/20/2024    HGB 12.4 05/20/2024    HGB 12.3 02/19/2024    HCT 36.5 05/20/2024    HCT 38.3 02/19/2024    MCV 94.6 05/20/2024    MCV 94.3 02/19/2024    MCH 32.1 05/20/2024    MCHC 34.0 05/20/2024     Lab Results   Component Value Date    RDW 13.5 05/20/2024    PLT 83 (L) 05/20/2024    MPV 10.4 05/20/2024    APTT 24.8 (L) 11/25/2018    CHOL 186 12/05/2023    TRIG 53 12/05/2023     (H) 12/05/2023    LDL 73.8 08/19/2021    ALBUMIN 3.9 05/20/2024    BILIDIR 0.30 11/25/2018    AST 40 (H) 05/20/2024    ALT 29 05/20/2024    ALKPHOS 79 05/20/2024    MG 2.00 01/18/2024     LABPROT 6.5 05/20/2024    LABPROT 11.5 05/20/2024    INR 1.1 05/20/2024       Assessment and Plan:  Sophy Iyer is a 63 y.o. female with cirrhosis, likely due to MASLD given elevated BMI and AST>ALT. Current recommendations:  Cirrhosis, MELD <15. I am recommending: I do a full serologic w/u for chronic liver disease; HCC screening every 6 months with imaging and an AFP (MRI now) and then abdo US in 6 months; <2000 mg salt per day  Memory problems: trial of lactulose reasonable however, ammonia not elevated-will recheck; if it does not help then would recommend MRI brain and neurology evaluation  Portal Htn. GI bleeding hx and varices on imaging: EGD as scheduled  Unintentional weight loss: MRI as above and ct chest; up to date with mammogram; get pap done; colonoscopy as scheduled; check CA 19-9, , AFP, CEA    Return 3 months in [place]

## 2024-07-19 NOTE — Clinical Note
Labs now at [facility] in [place] and in 3 months; return to see me in person in 3 months; MRI abdo and ct chest at local [facility] now

## 2024-07-24 ENCOUNTER — TELEPHONE (OUTPATIENT)
Dept: HEPATOLOGY | Facility: CLINIC | Age: 64
End: 2024-07-24
Payer: MEDICARE

## 2024-07-24 NOTE — TELEPHONE ENCOUNTER
----- Message from Coty Chakraborty MD sent at 7/19/2024  7:33 PM CDT -----  Labs now at [facility] in [place] and in 3 months; return to see me in person in 3 months; MRI abdo and ct chest at local [facility] now

## 2024-07-26 ENCOUNTER — PATIENT MESSAGE (OUTPATIENT)
Dept: FAMILY MEDICINE | Facility: CLINIC | Age: 64
End: 2024-07-26
Payer: MEDICARE

## 2024-07-29 ENCOUNTER — TELEPHONE (OUTPATIENT)
Dept: FAMILY MEDICINE | Facility: CLINIC | Age: 64
End: 2024-07-29
Payer: MEDICARE

## 2024-08-01 ENCOUNTER — TELEPHONE (OUTPATIENT)
Dept: FAMILY MEDICINE | Facility: CLINIC | Age: 64
End: 2024-08-01
Payer: MEDICARE

## 2024-08-03 DIAGNOSIS — I85.10 ESOPHAGEAL VARICES IN CIRRHOSIS: ICD-10-CM

## 2024-08-03 DIAGNOSIS — K74.60 ESOPHAGEAL VARICES IN CIRRHOSIS: ICD-10-CM

## 2024-08-03 DIAGNOSIS — R42 DIZZINESS: ICD-10-CM

## 2024-08-03 DIAGNOSIS — E55.9 VITAMIN D DEFICIENCY: ICD-10-CM

## 2024-08-05 RX ORDER — MECLIZINE HCL 12.5 MG 12.5 MG/1
12.5 TABLET ORAL 3 TIMES DAILY PRN
Qty: 30 TABLET | Refills: 1 | Status: SHIPPED | OUTPATIENT
Start: 2024-08-05

## 2024-08-05 RX ORDER — ERGOCALCIFEROL 1.25 MG/1
50000 CAPSULE ORAL
Qty: 12 CAPSULE | Refills: 3 | Status: SHIPPED | OUTPATIENT
Start: 2024-08-05

## 2024-08-05 RX ORDER — PANTOPRAZOLE SODIUM 40 MG/1
40 TABLET, DELAYED RELEASE ORAL DAILY
Qty: 30 TABLET | Refills: 5 | Status: SHIPPED | OUTPATIENT
Start: 2024-08-05 | End: 2024-08-20

## 2024-08-07 ENCOUNTER — TELEPHONE (OUTPATIENT)
Dept: FAMILY MEDICINE | Facility: CLINIC | Age: 64
End: 2024-08-07
Payer: MEDICARE

## 2024-08-08 ENCOUNTER — PATIENT MESSAGE (OUTPATIENT)
Dept: BEHAVIORAL HEALTH | Facility: CLINIC | Age: 64
End: 2024-08-08
Payer: MEDICARE

## 2024-08-09 NOTE — TELEPHONE ENCOUNTER
Called and spoke with pt. She stated that things are leveling off today and wanted to touch base. Pt stated that if things get worse over the weekend she will call the number on the tha or will go to ER. Pt reassured me that she is not suicidal or harmful to anyone else. She is asking if Mrs. Pinto can call her when she gets back into the office. She has an appt on 8/15 and will be here.     Please advise.

## 2024-08-12 ENCOUNTER — PATIENT MESSAGE (OUTPATIENT)
Dept: FAMILY MEDICINE | Facility: CLINIC | Age: 64
End: 2024-08-12
Payer: MEDICARE

## 2024-08-12 DIAGNOSIS — G25.81 RESTLESS LEGS SYNDROME: ICD-10-CM

## 2024-08-13 DIAGNOSIS — G25.81 RESTLESS LEGS SYNDROME: ICD-10-CM

## 2024-08-13 RX ORDER — ROPINIROLE 1 MG/1
1 TABLET, FILM COATED ORAL 2 TIMES DAILY
Qty: 60 TABLET | Refills: 1 | Status: SHIPPED | OUTPATIENT
Start: 2024-08-13 | End: 2024-08-13 | Stop reason: SDUPTHER

## 2024-08-13 RX ORDER — ROPINIROLE 1 MG/1
1 TABLET, FILM COATED ORAL 2 TIMES DAILY
Qty: 60 TABLET | Refills: 1 | Status: SHIPPED | OUTPATIENT
Start: 2024-08-13

## 2024-08-13 NOTE — TELEPHONE ENCOUNTER
----- Message from JULIO Gutierrez sent at 8/13/2024 10:16 AM CDT -----  Regarding: RE: REQUIP  Can send new script with BID dosing. Other message from patient states her last bottle was written for once daily. This should reflect a dose increase at her pharmacy and allow them to fill it  ----- Message -----  From: Gabriela Moulton LPN  Sent: 8/13/2024  10:12 AM CDT  To: JULIO Gutierrez  Subject: REQUIP                                            Patient stated that she is out of Requip.  Pharmacy told her that they cannot refill until Sept. I looked in her chart and she should not be out. Please advise.

## 2024-08-14 ENCOUNTER — TELEPHONE (OUTPATIENT)
Dept: FAMILY MEDICINE | Facility: CLINIC | Age: 64
End: 2024-08-14
Payer: MEDICARE

## 2024-08-14 NOTE — TELEPHONE ENCOUNTER
Called patient and she said she would like to have a virtual visit. I explained to her that Misael said that it has to be an in office visit. Patient stated that she was having a hard time finding a ride. She will let us know if she finds a ride.

## 2024-08-15 ENCOUNTER — OFFICE VISIT (OUTPATIENT)
Dept: BEHAVIORAL HEALTH | Facility: CLINIC | Age: 64
End: 2024-08-15
Payer: MEDICARE

## 2024-08-15 VITALS
OXYGEN SATURATION: 97 % | HEART RATE: 73 BPM | HEIGHT: 59 IN | TEMPERATURE: 98 F | SYSTOLIC BLOOD PRESSURE: 106 MMHG | BODY MASS INDEX: 33.15 KG/M2 | WEIGHT: 164.44 LBS | DIASTOLIC BLOOD PRESSURE: 68 MMHG

## 2024-08-15 DIAGNOSIS — Z79.899 LONG TERM USE OF DRUG: ICD-10-CM

## 2024-08-15 DIAGNOSIS — F41.1 GENERALIZED ANXIETY DISORDER: ICD-10-CM

## 2024-08-15 DIAGNOSIS — F31.9 BIPOLAR AFFECTIVE DISORDER, REMISSION STATUS UNSPECIFIED: Primary | ICD-10-CM

## 2024-08-15 DIAGNOSIS — G47.00 INSOMNIA, UNSPECIFIED TYPE: ICD-10-CM

## 2024-08-15 DIAGNOSIS — G25.81 RESTLESS LEGS SYNDROME: ICD-10-CM

## 2024-08-15 PROCEDURE — 99214 OFFICE O/P EST MOD 30 MIN: CPT | Mod: ,,, | Performed by: NURSE PRACTITIONER

## 2024-08-15 PROCEDURE — 1160F RVW MEDS BY RX/DR IN RCRD: CPT | Mod: ,,, | Performed by: NURSE PRACTITIONER

## 2024-08-15 PROCEDURE — 1159F MED LIST DOCD IN RCRD: CPT | Mod: ,,, | Performed by: NURSE PRACTITIONER

## 2024-08-15 PROCEDURE — 3008F BODY MASS INDEX DOCD: CPT | Mod: ,,, | Performed by: NURSE PRACTITIONER

## 2024-08-15 PROCEDURE — 90833 PSYTX W PT W E/M 30 MIN: CPT | Mod: ,,, | Performed by: NURSE PRACTITIONER

## 2024-08-15 PROCEDURE — 3074F SYST BP LT 130 MM HG: CPT | Mod: ,,, | Performed by: NURSE PRACTITIONER

## 2024-08-15 PROCEDURE — 3078F DIAST BP <80 MM HG: CPT | Mod: ,,, | Performed by: NURSE PRACTITIONER

## 2024-08-15 RX ORDER — CARBAMAZEPINE 200 MG/1
200 TABLET ORAL 2 TIMES DAILY
Qty: 60 TABLET | Refills: 1 | Status: SHIPPED | OUTPATIENT
Start: 2024-08-15

## 2024-08-15 RX ORDER — PAROXETINE 30 MG/1
60 TABLET, FILM COATED ORAL DAILY
Qty: 60 TABLET | Refills: 1 | Status: SHIPPED | OUTPATIENT
Start: 2024-08-15

## 2024-08-15 RX ORDER — TRAZODONE HYDROCHLORIDE 150 MG/1
TABLET ORAL
Qty: 90 TABLET | Refills: 1 | Status: SHIPPED | OUTPATIENT
Start: 2024-08-15

## 2024-08-15 RX ORDER — DICLOFENAC SODIUM 75 MG/1
75 TABLET, DELAYED RELEASE ORAL EVERY 12 HOURS PRN
COMMUNITY
Start: 2024-07-29
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
## 2024-08-27 ENCOUNTER — PATIENT MESSAGE (OUTPATIENT)
Dept: BEHAVIORAL HEALTH | Facility: CLINIC | Age: 64
End: 2024-08-27
Payer: MEDICARE

## 2024-08-28 ENCOUNTER — PATIENT MESSAGE (OUTPATIENT)
Dept: BEHAVIORAL HEALTH | Facility: CLINIC | Age: 64
End: 2024-08-28
Payer: MEDICARE

## 2024-09-03 ENCOUNTER — PATIENT MESSAGE (OUTPATIENT)
Dept: FAMILY MEDICINE | Facility: CLINIC | Age: 64
End: 2024-09-03
Payer: MEDICARE

## 2024-09-04 ENCOUNTER — PATIENT MESSAGE (OUTPATIENT)
Dept: BEHAVIORAL HEALTH | Facility: CLINIC | Age: 64
End: 2024-09-04
Payer: MEDICARE

## 2024-09-05 NOTE — TELEPHONE ENCOUNTER
Called and spoke with patient and she will keep her appointment on the 13 th of Sept.She stated that she is better.

## 2024-09-09 ENCOUNTER — PATIENT MESSAGE (OUTPATIENT)
Dept: BEHAVIORAL HEALTH | Facility: CLINIC | Age: 64
End: 2024-09-09
Payer: MEDICARE

## 2024-09-13 ENCOUNTER — OFFICE VISIT (OUTPATIENT)
Dept: BEHAVIORAL HEALTH | Facility: CLINIC | Age: 64
End: 2024-09-13
Payer: MEDICARE

## 2024-09-13 VITALS — HEIGHT: 59 IN | WEIGHT: 164.44 LBS | BODY MASS INDEX: 33.15 KG/M2

## 2024-09-13 DIAGNOSIS — F41.1 GENERALIZED ANXIETY DISORDER: ICD-10-CM

## 2024-09-13 DIAGNOSIS — F31.9 BIPOLAR AFFECTIVE DISORDER, REMISSION STATUS UNSPECIFIED: Primary | ICD-10-CM

## 2024-09-13 DIAGNOSIS — G47.00 INSOMNIA, UNSPECIFIED TYPE: ICD-10-CM

## 2024-09-13 DIAGNOSIS — G25.81 RESTLESS LEGS SYNDROME: ICD-10-CM

## 2024-09-13 PROCEDURE — 1160F RVW MEDS BY RX/DR IN RCRD: CPT | Mod: 95,,, | Performed by: NURSE PRACTITIONER

## 2024-09-13 PROCEDURE — 90833 PSYTX W PT W E/M 30 MIN: CPT | Mod: 95,,, | Performed by: NURSE PRACTITIONER

## 2024-09-13 PROCEDURE — 1159F MED LIST DOCD IN RCRD: CPT | Mod: 95,,, | Performed by: NURSE PRACTITIONER

## 2024-09-13 PROCEDURE — 99214 OFFICE O/P EST MOD 30 MIN: CPT | Mod: 95,,, | Performed by: NURSE PRACTITIONER

## 2024-09-13 PROCEDURE — 3008F BODY MASS INDEX DOCD: CPT | Mod: 95,,, | Performed by: NURSE PRACTITIONER

## 2024-09-13 RX ORDER — PAROXETINE 30 MG/1
60 TABLET, FILM COATED ORAL DAILY
Qty: 60 TABLET | Refills: 0 | Status: SHIPPED | OUTPATIENT
Start: 2024-09-13

## 2024-09-13 RX ORDER — CLONAZEPAM 0.5 MG/1
0.5 TABLET ORAL DAILY PRN
Qty: 15 TABLET | Refills: 0 | Status: SHIPPED | OUTPATIENT
Start: 2024-09-13

## 2024-09-13 RX ORDER — TRAZODONE HYDROCHLORIDE 150 MG/1
TABLET ORAL
Qty: 90 TABLET | Refills: 0 | Status: SHIPPED | OUTPATIENT
Start: 2024-09-13
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
## 2024-09-18 ENCOUNTER — PATIENT MESSAGE (OUTPATIENT)
Dept: BEHAVIORAL HEALTH | Facility: CLINIC | Age: 64
End: 2024-09-18
Payer: MEDICARE

## 2024-09-21 ENCOUNTER — PATIENT MESSAGE (OUTPATIENT)
Dept: BEHAVIORAL HEALTH | Facility: CLINIC | Age: 64
End: 2024-09-21
Payer: MEDICARE

## 2024-09-23 NOTE — TELEPHONE ENCOUNTER
Clonazepam is best used as a short-term medication and we discussed that it would not be a long-term medication when I sent the initial prescription

## 2024-10-03 ENCOUNTER — TELEPHONE (OUTPATIENT)
Dept: FAMILY MEDICINE | Facility: CLINIC | Age: 64
End: 2024-10-03
Payer: MEDICARE

## 2024-10-03 DIAGNOSIS — F41.1 GENERALIZED ANXIETY DISORDER: ICD-10-CM

## 2024-10-03 RX ORDER — CLONAZEPAM 0.5 MG/1
0.5 TABLET ORAL DAILY PRN
Qty: 7 TABLET | Refills: 0 | Status: SHIPPED | OUTPATIENT
Start: 2024-10-03

## 2024-10-03 NOTE — TELEPHONE ENCOUNTER
Please send 2 week supply of clonazepam and she can keep follow-up visit on 10-16-24 to discuss further
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
## 2024-10-04 NOTE — TELEPHONE ENCOUNTER
I would like her to be aware that she has a diagnosis of cirrhosis on her chart, and prolonged clonazepam use is contraindicated with that diagnosis. We will discuss other options at her next visit, but it is not a good medication for her to be on long-term. We will discuss more at her next visit, but I'm glad she is feeling better

## 2024-10-05 ENCOUNTER — PATIENT MESSAGE (OUTPATIENT)
Dept: BEHAVIORAL HEALTH | Facility: CLINIC | Age: 64
End: 2024-10-05
Payer: MEDICARE

## 2024-10-05 ENCOUNTER — PATIENT MESSAGE (OUTPATIENT)
Dept: FAMILY MEDICINE | Facility: CLINIC | Age: 64
End: 2024-10-05
Payer: MEDICARE

## 2024-10-08 ENCOUNTER — PATIENT MESSAGE (OUTPATIENT)
Dept: FAMILY MEDICINE | Facility: CLINIC | Age: 64
End: 2024-10-08
Payer: MEDICARE

## 2024-10-16 ENCOUNTER — TELEPHONE (OUTPATIENT)
Dept: BEHAVIORAL HEALTH | Facility: CLINIC | Age: 64
End: 2024-10-16
Payer: MEDICARE

## 2024-10-16 ENCOUNTER — OFFICE VISIT (OUTPATIENT)
Dept: BEHAVIORAL HEALTH | Facility: CLINIC | Age: 64
End: 2024-10-16
Payer: MEDICARE

## 2024-10-16 VITALS — WEIGHT: 164.44 LBS | BODY MASS INDEX: 33.15 KG/M2 | HEIGHT: 59 IN

## 2024-10-16 DIAGNOSIS — F31.9 BIPOLAR AFFECTIVE DISORDER, REMISSION STATUS UNSPECIFIED: Primary | ICD-10-CM

## 2024-10-16 DIAGNOSIS — F41.1 GENERALIZED ANXIETY DISORDER: ICD-10-CM

## 2024-10-16 DIAGNOSIS — G25.81 RESTLESS LEGS SYNDROME: ICD-10-CM

## 2024-10-16 DIAGNOSIS — G47.00 INSOMNIA, UNSPECIFIED TYPE: ICD-10-CM

## 2024-10-16 PROCEDURE — 1160F RVW MEDS BY RX/DR IN RCRD: CPT | Mod: 95,,, | Performed by: NURSE PRACTITIONER

## 2024-10-16 PROCEDURE — 3008F BODY MASS INDEX DOCD: CPT | Mod: 95,,, | Performed by: NURSE PRACTITIONER

## 2024-10-16 PROCEDURE — 99214 OFFICE O/P EST MOD 30 MIN: CPT | Mod: 95,,, | Performed by: NURSE PRACTITIONER

## 2024-10-16 PROCEDURE — 1159F MED LIST DOCD IN RCRD: CPT | Mod: 95,,, | Performed by: NURSE PRACTITIONER

## 2024-10-16 RX ORDER — CARBAMAZEPINE 200 MG/1
200 TABLET ORAL 2 TIMES DAILY
Qty: 60 TABLET | Refills: 0 | Status: SHIPPED | OUTPATIENT
Start: 2024-10-16

## 2024-10-16 RX ORDER — ARIPIPRAZOLE 5 MG/1
5 TABLET ORAL DAILY
Qty: 30 TABLET | Refills: 0 | Status: SHIPPED | OUTPATIENT
Start: 2024-10-16

## 2024-10-16 RX ORDER — PAROXETINE 30 MG/1
60 TABLET, FILM COATED ORAL DAILY
Qty: 60 TABLET | Refills: 0 | Status: SHIPPED | OUTPATIENT
Start: 2024-10-16

## 2024-10-16 RX ORDER — ARIPIPRAZOLE 5 MG/1
5 TABLET ORAL DAILY
COMMUNITY
End: 2024-10-16 | Stop reason: SDUPTHER

## 2024-10-16 RX ORDER — ROPINIROLE 1 MG/1
1 TABLET, FILM COATED ORAL 2 TIMES DAILY
Qty: 60 TABLET | Refills: 0 | Status: SHIPPED | OUTPATIENT
Start: 2024-10-16

## 2024-10-16 RX ORDER — TRAZODONE HYDROCHLORIDE 150 MG/1
TABLET ORAL
Qty: 90 TABLET | Refills: 0 | Status: SHIPPED | OUTPATIENT
Start: 2024-10-16

## 2024-10-16 RX ORDER — CARIPRAZINE 1.5 MG/1
1.5 CAPSULE, GELATIN COATED ORAL EVERY MORNING
Qty: 30 CAPSULE | Refills: 0 | Status: SHIPPED | OUTPATIENT
Start: 2024-10-16 | End: 2024-10-16 | Stop reason: ALTCHOICE

## 2024-10-16 NOTE — TELEPHONE ENCOUNTER
Vraylar denied by insurance. Per Misael we will send out Abilify 5 mg. Called patient to explain that insurance had denied Vraylar and ask if she has ever taken Abilify and she said no. I explained that it would be sent in for 30 days and to keep her follow up appointment. Patient verbalized understanding.

## 2024-10-16 NOTE — PROGRESS NOTES
"PSYCHIATRIC FOLLOW-UP VISIT NOTE    Chief Complaint   Patient presents with    Medication Management     Medication management         History of Present Illness  63 y.o. year old White female with hx of bipolar disorder, LILLY, and insomnia seen today for follow-up appointment and medication management.  Patient reports that it has been a rough few weeks.  Today's the anniversary of her mother's death and she was at increased sadness as the date approaches.  She was tearful during our visit today.  Her anxiety has also been higher and she has been irritable and isolative.  Also with racing thoughts.  Adamantly denies any thoughts of harming self or others.  Has completed clonazepam.  Agreed to discontinue that medication due to her liver dysfunction.  She was seeing a liver specialist on November 1st.  She was also back in contact with [facility], but denies any recent emotional abuse from him.  We discussed treatment options and agreed to start Vraylar every morning.  We will taper her off of Paxil if she has a positive response to the medication.  Patient verbalized understanding and agreement with treatment plan. Patient denies SI/HI. Denies hallucinations and does not appear to be responding to internal stimuli or be internally preoccupied. No manic symptoms noted. Tolerating SGA therapy without serious side effects. No NMS s/s. No EPS or TD symptoms noted. AIMS=0.        Objective:     Vitals:  Vitals:    10/16/24 0924   Weight: 74.6 kg (164 lb 7.4 oz)   Height: 4' 11.02" (1.499 m)       Wt Readings from Last 3 Encounters:   10/16/24 0924 74.6 kg (164 lb 7.4 oz)   09/13/24 1122 74.6 kg (164 lb 7.4 oz)   08/15/24 0925 74.6 kg (164 lb 7.4 oz)         Medication:    Current Outpatient Medications:     atorvastatin (LIPITOR) 40 MG tablet, Take 1 tablet (40 mg total) by mouth every evening., Disp: 90 tablet, Rfl: 3    clonazePAM (KLONOPIN) 0.5 MG tablet, Take 1 tablet (0.5 mg total) by mouth daily as needed (severe " anxiety)., Disp: 7 tablet, Rfl: 0    diclofenac (VOLTAREN) 75 MG EC tablet, Take 75 mg by mouth every 12 (twelve) hours as needed., Disp: , Rfl:     ergocalciferol (ERGOCALCIFEROL) 50,000 unit Cap, Take 1 capsule (50,000 Units total) by mouth every 7 days., Disp: 12 capsule, Rfl: 3    ketoconazole (NIZORAL) 2 % cream, Apply topically once daily., Disp: 60 g, Rfl: 3    meclizine (ANTIVERT) 12.5 mg tablet, Take 1 tablet (12.5 mg total) by mouth 3 (three) times daily as needed for Dizziness or Nausea., Disp: 30 tablet, Rfl: 1    naproxen (NAPROSYN) 500 MG tablet, TAKE 1 TABLET TWICE DAILY WITH FOOD FOR PAIN / INFLAMMATION, Disp: , Rfl:     rizatriptan (MAXALT) 10 MG tablet, TAKE 1 TABLET DAILY AS NEEDED FOR HEADACHE, Disp: 30 tablet, Rfl: 5    topiramate (TOPAMAX) 25 MG tablet, Take 1 tablet (25 mg total) by mouth 2 (two) times daily., Disp: 180 tablet, Rfl: 3    carBAMazepine (TEGRETOL) 200 mg tablet, Take 1 tablet (200 mg total) by mouth 2 (two) times daily., Disp: 60 tablet, Rfl: 0    cariprazine (VRAYLAR) 1.5 mg Cap, Take 1 capsule (1.5 mg total) by mouth every morning., Disp: 30 capsule, Rfl: 0    multivitamin with minerals tablet, Take 1 tablet by mouth once daily., Disp: , Rfl:     pantoprazole (PROTONIX) 40 MG tablet, Take 1 tablet (40 mg total) by mouth once daily. for 15 days, Disp: 30 tablet, Rfl: 05    paroxetine (PAXIL) 30 MG tablet, Take 2 tablets (60 mg total) by mouth once daily., Disp: 60 tablet, Rfl: 0    rOPINIRole (REQUIP) 1 MG tablet, Take 1 tablet (1 mg total) by mouth 2 (two) times a day., Disp: 60 tablet, Rfl: 0    traZODone (DESYREL) 150 MG tablet, TAKE 1 TABLET AT BEDTIME AS NEEDED FOR INSOMNIA, Disp: 90 tablet, Rfl: 0       Significant Labs: - none at this time    Significant Imaging: - none at this time    Physical Exam  Vitals and nursing note reviewed.   Constitutional:       General: She is awake.      Appearance: Normal appearance.   Musculoskeletal:      Comments: deferred  "  Neurological:      Mental Status: She is alert.   Psychiatric:         Attention and Perception: Attention and perception normal. She does not perceive auditory or visual hallucinations.         Mood and Affect: Mood is anxious and depressed. Affect is tearful.         Speech: Speech normal.         Behavior: Behavior is withdrawn. Behavior is cooperative.         Thought Content: Thought content does not include homicidal or suicidal ideation.         Cognition and Memory: Cognition and memory normal.         Judgment: Judgment normal.          Review of Systems     Mental Status Exam:  Presentation:  - Appearance: 63 y.o. year old White female, appears stated age, appears Casually dressed and Well groomed  - Motility: No EPS or Tremors, No psychomotor agitation or retardation appreciated  - Behavior: cooperative, maintains eye contact, sullen  Speech:  - Character/Organization: spontaneous, fluent, normal volume, normal rate, normal rhythm  Emotional State:  - Mood: "anxious, sad"   - Affect: congruent, tearful, and anxious  Thought:  - Process: logical, linear, organized , goal-directed  - Preoccupations: guilt  - Delusions: no persecutory, paranoid, or grandiose delusions appreciated  - Perception: denies AVH, not actively responding to internal stimuli  - SI/HI: denies/denies  Sensorium & Intellect:  - Sensorium: AAOx4  - Memory: intact to recent and remote events  - Attention/Concentration: good/good  - Insight/Judgement: good/good    Gait: deferred   MSK:deferred     All other systems without acute issues unless noted in HPI      Assessment/Plan      ICD-10-CM ICD-9-CM    1. Bipolar affective disorder, remission status unspecified  F31.9 296.80 paroxetine (PAXIL) 30 MG tablet      carBAMazepine (TEGRETOL) 200 mg tablet      2. Generalized anxiety disorder  F41.1 300.02 paroxetine (PAXIL) 30 MG tablet      3. Insomnia, unspecified type  G47.00 780.52 traZODone (DESYREL) 150 MG tablet      4. Restless legs " syndrome  G25.81 333.94 rOPINIRole (REQUIP) 1 MG tablet         Start Vraylar 1.5 mg every morning    Discontinue clonazepam.  Therapy completed    Continue other medications without change.  Plan to taper off of Paxil if patient responds positively to Vraylar     checked. Results as expected. No suspected diversion or abuse of controlled substances.    Potential side effects and risks vs benefits of current treatment plan reviewed with patient. Applicable black box warnings reviewed. Encouraged patient not to alter dosages or abruptly discontinue medications without contacting prescriber first, due to risk of worsening symptoms and decompensation of mental status. Warned of risks associated with herbal remedies and supplements while taking psychotropic medications and of the need to consult prescriber prior to adding any of these to current regimen. Patient should abstain from abuse of alcohol, prescription medications, and illicit drugs. Reviewed when to contact clinic and/or seek emergent care, such as but not limited to, onset/worsening SI/HI, hallucinations, delusions, manic symptoms. Pt verbalized understanding and agreement of these warnings/recommendations and verbally consented to treatment plan.    The patient was informed of the restrictions of face-to-face healthcare visits. The patient verbally consented to proceed with a telemedicine visit, following discussion of the options of face-to-face or telemedicine visits. The telemedicine visit was completed using EPIC Video call without complication. The visit was conducted with limited physical exam and was medically appropriate to meet the patient's needs.       Follow up in about 2 weeks (around 10/30/2024) for Medication Management.    Matthew Ferreira, JULIO

## 2024-10-24 ENCOUNTER — PATIENT MESSAGE (OUTPATIENT)
Dept: FAMILY MEDICINE | Facility: CLINIC | Age: 64
End: 2024-10-24
Payer: MEDICARE

## 2024-10-24 ENCOUNTER — PATIENT MESSAGE (OUTPATIENT)
Dept: BEHAVIORAL HEALTH | Facility: CLINIC | Age: 64
End: 2024-10-24
Payer: MEDICARE

## 2024-10-31 DIAGNOSIS — F31.9 BIPOLAR AFFECTIVE DISORDER, REMISSION STATUS UNSPECIFIED: ICD-10-CM

## 2024-10-31 RX ORDER — CARBAMAZEPINE 200 MG/1
200 TABLET ORAL 2 TIMES DAILY
Qty: 60 TABLET | Refills: 0 | OUTPATIENT
Start: 2024-10-31

## 2024-11-04 ENCOUNTER — PATIENT MESSAGE (OUTPATIENT)
Dept: BEHAVIORAL HEALTH | Facility: CLINIC | Age: 64
End: 2024-11-04
Payer: MEDICARE

## 2024-11-05 ENCOUNTER — PATIENT MESSAGE (OUTPATIENT)
Dept: BEHAVIORAL HEALTH | Facility: CLINIC | Age: 64
End: 2024-11-05
Payer: MEDICARE

## 2024-11-05 ENCOUNTER — OFFICE VISIT (OUTPATIENT)
Dept: BEHAVIORAL HEALTH | Facility: CLINIC | Age: 64
End: 2024-11-05
Payer: MEDICARE

## 2024-11-05 VITALS — BODY MASS INDEX: 33.15 KG/M2 | HEIGHT: 59 IN | WEIGHT: 164.44 LBS

## 2024-11-05 DIAGNOSIS — F41.1 GENERALIZED ANXIETY DISORDER: ICD-10-CM

## 2024-11-05 DIAGNOSIS — G25.81 RESTLESS LEGS SYNDROME: ICD-10-CM

## 2024-11-05 DIAGNOSIS — G47.00 INSOMNIA, UNSPECIFIED TYPE: ICD-10-CM

## 2024-11-05 DIAGNOSIS — F31.9 BIPOLAR AFFECTIVE DISORDER, REMISSION STATUS UNSPECIFIED: Primary | ICD-10-CM

## 2024-11-05 PROCEDURE — 3008F BODY MASS INDEX DOCD: CPT | Mod: 95,,, | Performed by: NURSE PRACTITIONER

## 2024-11-05 PROCEDURE — 1160F RVW MEDS BY RX/DR IN RCRD: CPT | Mod: 95,,, | Performed by: NURSE PRACTITIONER

## 2024-11-05 PROCEDURE — 1159F MED LIST DOCD IN RCRD: CPT | Mod: 95,,, | Performed by: NURSE PRACTITIONER

## 2024-11-05 PROCEDURE — 99214 OFFICE O/P EST MOD 30 MIN: CPT | Mod: 95,,, | Performed by: NURSE PRACTITIONER

## 2024-11-05 RX ORDER — CARIPRAZINE 1.5 MG/1
1.5 CAPSULE, GELATIN COATED ORAL EVERY OTHER DAY
Qty: 30 CAPSULE | Refills: 0 | Status: SHIPPED | OUTPATIENT
Start: 2024-11-05

## 2024-11-05 RX ORDER — TRAZODONE HYDROCHLORIDE 150 MG/1
TABLET ORAL
Qty: 90 TABLET | Refills: 0 | Status: SHIPPED | OUTPATIENT
Start: 2024-11-05

## 2024-11-05 RX ORDER — CARBAMAZEPINE 200 MG/1
200 TABLET ORAL 2 TIMES DAILY
Qty: 60 TABLET | Refills: 0 | Status: SHIPPED | OUTPATIENT
Start: 2024-11-05

## 2024-11-05 RX ORDER — PAROXETINE 30 MG/1
60 TABLET, FILM COATED ORAL DAILY
Qty: 60 TABLET | Refills: 0 | Status: SHIPPED | OUTPATIENT
Start: 2024-11-05

## 2024-11-05 NOTE — PROGRESS NOTES
"PSYCHIATRIC FOLLOW-UP VISIT NOTE    Chief Complaint   Patient presents with    Medication Management     Medication management         History of Present Illness  63 y.o. year old White female with hx of bipolar disorder, LILLY, insomnia, and RLS seen today for follow-up appointment and medication management.  Patient reports that she stopped Abilify after several days due to increased anxiety and restlessness.  She was still with daily anxiety and depression but feels things has been improving over the past 2 weeks since she cut off all contact with [facility].  Has had no panic attacks since that time.  Still sleeping well with current dose of trazodone.  Has noticed that her motivation and energy levels are better this week.  She did experience 1 instance of morbid ruminations last week but adamantly denies any active thoughts of harming self or others.  No morbid ruminations in the past 5 days at least.  Denies any side effects since stopping Abilify. Patient denies SI/HI. Denies hallucinations and does not appear to be responding to internal stimuli or be internally preoccupied. No manic symptoms noted. Tolerating SGA therapy without serious side effects. No NMS s/s. No EPS or TD symptoms noted. AIMS=0.        Objective:     Vitals:  Vitals:    11/05/24 0739   Weight: 74.6 kg (164 lb 7.4 oz)   Height: 4' 11.02" (1.499 m)       Wt Readings from Last 3 Encounters:   11/05/24 0739 74.6 kg (164 lb 7.4 oz)   10/16/24 0924 74.6 kg (164 lb 7.4 oz)   09/13/24 1122 74.6 kg (164 lb 7.4 oz)         Medication:    Current Outpatient Medications:     atorvastatin (LIPITOR) 40 MG tablet, Take 1 tablet (40 mg total) by mouth every evening., Disp: 90 tablet, Rfl: 3    ergocalciferol (ERGOCALCIFEROL) 50,000 unit Cap, Take 1 capsule (50,000 Units total) by mouth every 7 days., Disp: 12 capsule, Rfl: 3    ketoconazole (NIZORAL) 2 % cream, Apply topically once daily., Disp: 60 g, Rfl: 3    meclizine (ANTIVERT) 12.5 mg tablet, Take 1 " tablet (12.5 mg total) by mouth 3 (three) times daily as needed for Dizziness or Nausea., Disp: 30 tablet, Rfl: 1    multivitamin with minerals tablet, Take 1 tablet by mouth once daily., Disp: , Rfl:     naproxen (NAPROSYN) 500 MG tablet, TAKE 1 TABLET TWICE DAILY WITH FOOD FOR PAIN / INFLAMMATION, Disp: , Rfl:     rizatriptan (MAXALT) 10 MG tablet, TAKE 1 TABLET DAILY AS NEEDED FOR HEADACHE, Disp: 30 tablet, Rfl: 5    rOPINIRole (REQUIP) 1 MG tablet, Take 1 tablet (1 mg total) by mouth 2 (two) times a day., Disp: 60 tablet, Rfl: 0    carBAMazepine (TEGRETOL) 200 mg tablet, Take 1 tablet (200 mg total) by mouth 2 (two) times daily., Disp: 60 tablet, Rfl: 0    cariprazine (VRAYLAR) 1.5 mg Cap, Take 1 capsule (1.5 mg total) by mouth every other day. In the morning, Disp: 30 capsule, Rfl: 0    pantoprazole (PROTONIX) 40 MG tablet, Take 1 tablet (40 mg total) by mouth once daily. for 15 days, Disp: 30 tablet, Rfl: 05    paroxetine (PAXIL) 30 MG tablet, Take 2 tablets (60 mg total) by mouth once daily., Disp: 60 tablet, Rfl: 0    traZODone (DESYREL) 150 MG tablet, TAKE 1 TABLET AT BEDTIME AS NEEDED FOR INSOMNIA, Disp: 90 tablet, Rfl: 0       Significant Labs: - none at this time    Significant Imaging: - none at this time    Physical Exam  Vitals and nursing note reviewed.   Constitutional:       General: She is awake.      Appearance: Normal appearance.   Musculoskeletal:      Comments: deferred   Neurological:      Mental Status: She is alert.   Psychiatric:         Attention and Perception: Attention and perception normal. She does not perceive auditory or visual hallucinations.         Mood and Affect: Affect normal. Mood is anxious and depressed.         Speech: Speech normal.         Behavior: Behavior is withdrawn. Behavior is cooperative.         Thought Content: Thought content does not include homicidal or suicidal ideation.         Cognition and Memory: Cognition and memory normal.         Judgment: Judgment  "normal.          Review of Systems     Mental Status Exam:  Presentation:  - Appearance: 63 y.o. year old White female, appears stated age, appears Casually dressed and Well groomed  - Motility: No EPS or Tremors, No psychomotor agitation or retardation appreciated  - Behavior: calm, cooperative, good eye contact  Speech:  - Character/Organization: spontaneous, fluent, normal volume, normal rate, normal rhythm  Emotional State:  - Mood: "depressed/anxious"   - Affect: congruent and depressed  Thought:  - Process: logical, linear, organized , goal-directed  - Preoccupations: guilt  - Delusions: no persecutory, paranoid, or grandiose delusions appreciated  - Perception: denies AVH, not actively responding to internal stimuli  - SI/HI: denies/denies  Sensorium & Intellect:  - Sensorium: AAOx4  - Memory: intact to recent and remote events  - Attention/Concentration: good/good  - Insight/Judgement: good/good    Gait: deferred   MSK:deferred     All other systems without acute issues unless noted in HPI      Assessment/Plan      ICD-10-CM ICD-9-CM    1. Bipolar affective disorder, remission status unspecified  F31.9 296.80 paroxetine (PAXIL) 30 MG tablet      carBAMazepine (TEGRETOL) 200 mg tablet      2. Generalized anxiety disorder  F41.1 300.02 paroxetine (PAXIL) 30 MG tablet      3. Insomnia, unspecified type  G47.00 780.52 traZODone (DESYREL) 150 MG tablet      4. Restless legs syndrome  G25.81 333.94          Discontinue Abilify    Start Vraylar 1.5 mg every other day in the morning    Continue other medications without change    Potential side effects and risks vs benefits of current treatment plan reviewed with patient. Applicable black box warnings reviewed. Encouraged patient not to alter dosages or abruptly discontinue medications without contacting prescriber first, due to risk of worsening symptoms and decompensation of mental status. Warned of risks associated with herbal remedies and supplements while " taking psychotropic medications and of the need to consult prescriber prior to adding any of these to current regimen. Patient should abstain from abuse of alcohol, prescription medications, and illicit drugs. Reviewed when to contact clinic and/or seek emergent care, such as but not limited to, onset/worsening SI/HI, hallucinations, delusions, manic symptoms. Pt verbalized understanding and agreement of these warnings/recommendations and verbally consented to treatment plan.    The patient was informed of the restrictions of face-to-face healthcare visits. The patient verbally consented to proceed with a telemedicine visit, following discussion of the options of face-to-face or telemedicine visits. The telemedicine visit was completed using EPIC Video call without complication. The visit was conducted with limited physical exam and was medically appropriate to meet the patient's needs.      Follow up in about 3 weeks (around 11/26/2024) for Medication Management.    Matthew Ferreira, MAYTEP

## 2024-11-07 ENCOUNTER — TELEPHONE (OUTPATIENT)
Dept: FAMILY MEDICINE | Facility: CLINIC | Age: 64
End: 2024-11-07
Payer: MEDICARE

## 2024-11-07 ENCOUNTER — PATIENT MESSAGE (OUTPATIENT)
Dept: BEHAVIORAL HEALTH | Facility: CLINIC | Age: 64
End: 2024-11-07
Payer: MEDICARE

## 2024-11-08 NOTE — TELEPHONE ENCOUNTER
Spoke with [facility], asking if you can send Vraylar 1.5 mg for a 30 day supply, and let pt know to take dose every other day. Please advise.

## 2024-11-13 ENCOUNTER — TELEPHONE (OUTPATIENT)
Dept: FAMILY MEDICINE | Facility: CLINIC | Age: 64
End: 2024-11-13

## 2024-11-13 ENCOUNTER — ON-DEMAND VIRTUAL (OUTPATIENT)
Dept: URGENT CARE | Facility: CLINIC | Age: 64
End: 2024-11-13
Payer: MEDICARE

## 2024-11-13 ENCOUNTER — PATIENT MESSAGE (OUTPATIENT)
Dept: FAMILY MEDICINE | Facility: CLINIC | Age: 64
End: 2024-11-13

## 2024-11-13 DIAGNOSIS — M94.0 COSTOCHONDRITIS: Primary | ICD-10-CM

## 2024-11-13 RX ORDER — DICLOFENAC SODIUM 75 MG/1
75 TABLET, DELAYED RELEASE ORAL 2 TIMES DAILY
Qty: 60 TABLET | Refills: 0 | Status: SHIPPED | OUTPATIENT
Start: 2024-11-13 | End: 2024-12-13

## 2024-11-13 NOTE — PROGRESS NOTES
Subjective:      Patient ID: Sophy Iyer is a 64 y.o. female.    Vitals:  vitals were not taken for this visit.     Chief Complaint: Muscle Pain      Visit Type: TELE AUDIOVISUAL    Present with the patient at the time of consultation: TELEMED PRESENT WITH PATIENT: None    Past Medical History:   Diagnosis Date    Anemia     Anxiety     Bipolar disorder     Calculus of kidney 02/22/2023    Chronic sinusitis, unspecified     Cirrhosis 07/19/2024    Depression     Fatigue     GERD (gastroesophageal reflux disease)     Grief     Intra-abdominal varices 06/03/2024    Kidney stone     Left shoulder pain     Migraine     Mixed hyperlipidemia     Obesity, unspecified     Restless legs syndrome (RLS)     Trapezius muscle spasm     Vitamin D deficiency      Past Surgical History:   Procedure Laterality Date    CARPAL TUNNEL RELEASE      CHOLECYSTECTOMY      COLON BIOPSY      COLON SURGERY      HYSTERECTOMY      SMALL INTESTINE SURGERY  ?2019    Large tumor     Review of patient's allergies indicates:   Allergen Reactions    Penicillins     Codeine Rash    Prednisone Anxiety     Current Outpatient Medications on File Prior to Visit   Medication Sig Dispense Refill    atorvastatin (LIPITOR) 40 MG tablet Take 1 tablet (40 mg total) by mouth every evening. 90 tablet 3    carBAMazepine (TEGRETOL) 200 mg tablet Take 1 tablet (200 mg total) by mouth 2 (two) times daily. 60 tablet 0    cariprazine (VRAYLAR) 1.5 mg Cap Take 1 capsule (1.5 mg total) by mouth every other day. In the morning 30 capsule 0    ergocalciferol (ERGOCALCIFEROL) 50,000 unit Cap Take 1 capsule (50,000 Units total) by mouth every 7 days. 12 capsule 3    ketoconazole (NIZORAL) 2 % cream Apply topically once daily. 60 g 3    meclizine (ANTIVERT) 12.5 mg tablet Take 1 tablet (12.5 mg total) by mouth 3 (three) times daily as needed for Dizziness or Nausea. 30 tablet 1    multivitamin with minerals tablet Take 1 tablet by mouth once daily.      naproxen  (NAPROSYN) 500 MG tablet TAKE 1 TABLET TWICE DAILY WITH FOOD FOR PAIN / INFLAMMATION      pantoprazole (PROTONIX) 40 MG tablet Take 1 tablet (40 mg total) by mouth once daily. for 15 days 30 tablet 05    paroxetine (PAXIL) 30 MG tablet Take 2 tablets (60 mg total) by mouth once daily. 60 tablet 0    rizatriptan (MAXALT) 10 MG tablet TAKE 1 TABLET DAILY AS NEEDED FOR HEADACHE 30 tablet 5    rOPINIRole (REQUIP) 1 MG tablet Take 1 tablet (1 mg total) by mouth 2 (two) times a day. 60 tablet 0    traZODone (DESYREL) 150 MG tablet TAKE 1 TABLET AT BEDTIME AS NEEDED FOR INSOMNIA 90 tablet 0     No current facility-administered medications on file prior to visit.     Family History   Problem Relation Name Age of Onset    Hypothyroidism Mother Mell ()     Parkinsonism Mother Mell ()     Miscarriages / Stillbirths Mother Mell ()     Bipolar disorder Father Neptali     Depression Father Neptali     Coronary artery disease Father Neptali     Hypertension Father Neptali     Hypothyroidism Father Neptali     Heart disease Father Neptali     Mental illness Father Neptali     Hypertension Sister Radha     Hypothyroidism Sister Radha     Diabetes Sister Radha     No Known Problems Brother      No Known Problems Maternal Aunt      No Known Problems Paternal Aunt      No Known Problems Maternal Uncle      No Known Problems Paternal Uncle      No Known Problems Maternal Grandfather      No Known Problems Maternal Grandmother      Diabetes Paternal Grandfather Giselle Gage     Bipolar disorder Paternal Grandmother Shakira     Depression Paternal Grandmother Digna Suarez     Mental illness Paternal Grandmother Digna Suarez     No Known Problems Cousin      No Known Problems Other         Medications Ordered                [facility] - [place] - [address]   [address] [address]    Telephone: [phone]   Fax: [phone]   Hours: Not open 24 hours                          E-Prescribed (1 of 1)              diclofenac (VOLTAREN) 75 MG EC tablet    Sig: Take 1 tablet (75 mg total) by mouth 2 (two) times daily.       Start: 11/13/24     Quantity: 60 tablet Refills: 0                           Oh[facility] Peq Odvv Intake    11/13/2024  7:27 AM CST - Filed by Patient   What is your current physical [address] in the event of a medical emergency[facility] [address] [facility] [address]   Are you able to take your vital signs? Yes   Systolic Blood Pressure: 127   Diastolic Blood Pressure: 64   Weight: 164   Height: 59   Pulse: 58   Temperature: 97.6   Respiration rate:    Pulse Oxygen:    Please attach any relevant images or files    Is your employer contracted with [facility]? No         Had an appt to see PCP    Due to weather and transportation    Patient fell flat forward    Left rib cage    Seen in ER given rx    Pain under the left breast, wrapping towards the back          Musculoskeletal:  Positive for pain.        Objective:   The physical exam was conducted virtually.  Physical Exam   Abdominal: Normal appearance.   Musculoskeletal:         General: Tenderness present.   Neurological: She is alert.       Assessment:     1. Costochondritis        Plan:       Costochondritis    Other orders  -     diclofenac (VOLTAREN) 75 MG EC tablet; Take 1 tablet (75 mg total) by mouth 2 (two) times daily.  Dispense: 60 tablet; Refill: 0

## 2024-11-14 NOTE — TELEPHONE ENCOUNTER
We will of course respect her decision. I hope she is doing well and will see her at her next appointment

## 2024-11-21 ENCOUNTER — ON-DEMAND VIRTUAL (OUTPATIENT)
Dept: URGENT CARE | Facility: CLINIC | Age: 64
End: 2024-11-21
Payer: MEDICARE

## 2024-11-21 DIAGNOSIS — J11.1 INFLUENZA: Primary | ICD-10-CM

## 2024-11-21 RX ORDER — FLUTICASONE PROPIONATE 50 MCG
1 SPRAY, SUSPENSION (ML) NASAL DAILY
Qty: 16 ML | Refills: 0 | Status: SHIPPED | OUTPATIENT
Start: 2024-11-21

## 2024-11-21 RX ORDER — OSELTAMIVIR PHOSPHATE 75 MG/1
75 CAPSULE ORAL 2 TIMES DAILY
Qty: 10 CAPSULE | Refills: 0 | Status: SHIPPED | OUTPATIENT
Start: 2024-11-21 | End: 2024-11-26

## 2024-11-21 RX ORDER — BENZONATATE 100 MG/1
100 CAPSULE ORAL 3 TIMES DAILY PRN
Qty: 30 CAPSULE | Refills: 0 | Status: SHIPPED | OUTPATIENT
Start: 2024-11-21 | End: 2024-12-01

## 2024-11-21 NOTE — PROGRESS NOTES
Subjective:      Patient ID: Sophy Iyer is a 64 y.o. female.    Vitals:  vitals were not taken for this visit.     Chief Complaint: Influenza      Visit Type: TELE AUDIOVISUAL    Present with the patient at the time of consultation: TELEMED PRESENT WITH PATIENT: None    Past Medical History:   Diagnosis Date    Anemia     Anxiety     Bipolar disorder     Calculus of kidney 02/22/2023    Chronic sinusitis, unspecified     Cirrhosis 07/19/2024    Depression     Fatigue     GERD (gastroesophageal reflux disease)     Grief     Intra-abdominal varices 06/03/2024    Kidney stone     Left shoulder pain     Migraine     Mixed hyperlipidemia     Obesity, unspecified     Restless legs syndrome (RLS)     Trapezius muscle spasm     Vitamin D deficiency      Past Surgical History:   Procedure Laterality Date    CARPAL TUNNEL RELEASE      CHOLECYSTECTOMY      COLON BIOPSY      COLON SURGERY      HYSTERECTOMY      SMALL INTESTINE SURGERY  ?2019    Large tumor     Review of patient's allergies indicates:   Allergen Reactions    Penicillins     Codeine Rash    Prednisone Anxiety     Current Outpatient Medications on File Prior to Visit   Medication Sig Dispense Refill    atorvastatin (LIPITOR) 40 MG tablet Take 1 tablet (40 mg total) by mouth every evening. 90 tablet 3    carBAMazepine (TEGRETOL) 200 mg tablet Take 1 tablet (200 mg total) by mouth 2 (two) times daily. 60 tablet 0    cariprazine (VRAYLAR) 1.5 mg Cap Take 1 capsule (1.5 mg total) by mouth every other day. In the morning 30 capsule 0    diclofenac (VOLTAREN) 75 MG EC tablet Take 1 tablet (75 mg total) by mouth 2 (two) times daily. 60 tablet 0    ergocalciferol (ERGOCALCIFEROL) 50,000 unit Cap Take 1 capsule (50,000 Units total) by mouth every 7 days. 12 capsule 3    ketoconazole (NIZORAL) 2 % cream Apply topically once daily. 60 g 3    meclizine (ANTIVERT) 12.5 mg tablet Take 1 tablet (12.5 mg total) by mouth 3 (three) times daily as needed for Dizziness or  Nausea. 30 tablet 1    multivitamin with minerals tablet Take 1 tablet by mouth once daily.      naproxen (NAPROSYN) 500 MG tablet TAKE 1 TABLET TWICE DAILY WITH FOOD FOR PAIN / INFLAMMATION      pantoprazole (PROTONIX) 40 MG tablet Take 1 tablet (40 mg total) by mouth once daily. for 15 days 30 tablet 05    paroxetine (PAXIL) 30 MG tablet Take 2 tablets (60 mg total) by mouth once daily. 60 tablet 0    rizatriptan (MAXALT) 10 MG tablet TAKE 1 TABLET DAILY AS NEEDED FOR HEADACHE 30 tablet 5    rOPINIRole (REQUIP) 1 MG tablet Take 1 tablet (1 mg total) by mouth 2 (two) times a day. 60 tablet 0    traZODone (DESYREL) 150 MG tablet TAKE 1 TABLET AT BEDTIME AS NEEDED FOR INSOMNIA 90 tablet 0     No current facility-administered medications on file prior to visit.     Family History   Problem Relation Name Age of Onset    Hypothyroidism Mother Mell ()     Parkinsonism Mother Mell ()     Miscarriages / Stillbirths Mother Mell ()     Bipolar disorder Father Neptali     Depression Father Neptali     Coronary artery disease Father Neptali     Hypertension Father Neptali     Hypothyroidism Father Neptali     Heart disease Father Neptali     Mental illness Father Neptali     Hypertension Sister Radha     Hypothyroidism Sister Radha     Diabetes Sister Radha     No Known Problems Brother      No Known Problems Maternal Aunt      No Known Problems Paternal Aunt      No Known Problems Maternal Uncle      No Known Problems Paternal Uncle      No Known Problems Maternal Grandfather      No Known Problems Maternal Grandmother      Diabetes Paternal Grandfather Giselle Gage     Bipolar disorder Paternal Grandmother Digna Suarez     Depression Paternal Grandmother Digna Suarez     Mental illness Paternal Grandmother Digna Suarez     No Known Problems Cousin      No Known Problems Other         Medications Ordered                [facility] - [place] - [address]   [address]  [place] [address]    Telephone: [phone]   Fax: [phone]   Hours: Not open 24 hours                         E-Prescribed (3 of 3)              benzonatate (TESSALON) 100 MG capsule    Sig: Take 1 capsule (100 mg total) by mouth 3 (three) times daily as needed for Cough.       Start: 24     Quantity: 30 capsule Refills: 0                         fluticasone propionate (FLONASE) 50 mcg/actuation nasal spray    Si spray (50 mcg total) by Each Nostril route once daily.       Start: 24     Quantity: 16 mL Refills: 0                         oseltamivir (TAMIFLU) 75 MG capsule    Sig: Take 1 capsule (75 mg total) by mouth 2 (two) times daily. for 5 days       Start: 24     Quantity: 10 capsule Refills: 0                           Ohs Peq Odvv Intake    2024  9:37 AM CST - Filed by Patient   What is your current physical address in the event of a medical emergency? [address] [place] [id number]   Are you able to take your vital signs? Yes   Systolic Blood Pressure: 145   Diastolic Blood Pressure: 69   Weight: 165   Height: 59   Pulse: 64   Temperature: 99.8   Respiration rate:    Pulse Oxygen:    Please attach any relevant images or files    Is your employer contracted with [facility]? No         Patient Location: home    Influenza  This is a new (Covid test was negative today.) problem. Episode onset: 5 days ago. The problem occurs constantly. The problem has been gradually worsening. Associated symptoms include congestion, coughing, fatigue, a fever, headaches, myalgias and a sore throat. Pertinent negatives include no abdominal pain, anorexia, arthralgias, change in bowel habit, chest pain, chills, diaphoresis, joint swelling, nausea, neck pain, numbness, rash, swollen glands, urinary symptoms, vertigo, visual change, vomiting or weakness. Nothing aggravates the symptoms. She has tried NSAIDs for the symptoms. The treatment provided no relief.       Constitution: Positive  for fatigue and fever. Negative for chills and sweating.   HENT:  Positive for congestion and sore throat.    Neck: Negative for neck pain.   Cardiovascular:  Negative for chest pain.   Respiratory:  Positive for cough.    Gastrointestinal:  Negative for abdominal pain, nausea and vomiting.   Musculoskeletal:  Positive for muscle ache. Negative for joint pain and joint swelling.   Skin:  Negative for rash.   Neurological:  Positive for headaches. Negative for history of vertigo and numbness.        Objective:   The physical exam was conducted virtually.  Physical Exam   Constitutional: She is oriented to person, place, and time.   HENT:   Head: Normocephalic.   Ears:   Right Ear: External ear normal.   Left Ear: External ear normal.   Nose: Rhinorrhea and congestion present.   Pulmonary/Chest: Effort normal. No respiratory distress.   Abdominal: Normal appearance.   Neurological: She is alert and oriented to person, place, and time.   Skin: Skin is no rash.       Assessment:     1. Influenza        Plan:       Influenza  -     oseltamivir (TAMIFLU) 75 MG capsule; Take 1 capsule (75 mg total) by mouth 2 (two) times daily. for 5 days  Dispense: 10 capsule; Refill: 0  -     benzonatate (TESSALON) 100 MG capsule; Take 1 capsule (100 mg total) by mouth 3 (three) times daily as needed for Cough.  Dispense: 30 capsule; Refill: 0  -     fluticasone propionate (FLONASE) 50 mcg/actuation nasal spray; 1 spray (50 mcg total) by Each Nostril route once daily.  Dispense: 16 mL; Refill: 0

## 2024-11-22 ENCOUNTER — PATIENT MESSAGE (OUTPATIENT)
Dept: BEHAVIORAL HEALTH | Facility: CLINIC | Age: 64
End: 2024-11-22
Payer: MEDICARE

## 2024-11-22 ENCOUNTER — PATIENT MESSAGE (OUTPATIENT)
Dept: FAMILY MEDICINE | Facility: CLINIC | Age: 64
End: 2024-11-22
Payer: MEDICARE

## 2024-12-05 ENCOUNTER — PATIENT MESSAGE (OUTPATIENT)
Dept: BEHAVIORAL HEALTH | Facility: CLINIC | Age: 64
End: 2024-12-05
Payer: MEDICARE

## 2024-12-05 DIAGNOSIS — F41.1 GENERALIZED ANXIETY DISORDER: ICD-10-CM

## 2024-12-05 DIAGNOSIS — G47.00 INSOMNIA, UNSPECIFIED TYPE: ICD-10-CM

## 2024-12-05 DIAGNOSIS — F31.9 BIPOLAR AFFECTIVE DISORDER, REMISSION STATUS UNSPECIFIED: Primary | ICD-10-CM

## 2024-12-09 DIAGNOSIS — G43.919 INTRACTABLE MIGRAINE WITHOUT STATUS MIGRAINOSUS, UNSPECIFIED MIGRAINE TYPE: ICD-10-CM

## 2024-12-10 RX ORDER — RIZATRIPTAN BENZOATE 10 MG/1
10 TABLET ORAL ONCE AS NEEDED
Qty: 16 TABLET | Refills: 0 | Status: SHIPPED | OUTPATIENT
Start: 2024-12-10 | End: 2024-12-10

## 2024-12-11 ENCOUNTER — OFFICE VISIT (OUTPATIENT)
Dept: BEHAVIORAL HEALTH | Facility: CLINIC | Age: 64
End: 2024-12-11
Payer: MEDICARE

## 2024-12-11 VITALS — WEIGHT: 164.44 LBS | BODY MASS INDEX: 33.15 KG/M2 | HEIGHT: 59 IN

## 2024-12-11 DIAGNOSIS — F31.9 BIPOLAR AFFECTIVE DISORDER, REMISSION STATUS UNSPECIFIED: Primary | ICD-10-CM

## 2024-12-11 DIAGNOSIS — G47.00 INSOMNIA, UNSPECIFIED TYPE: ICD-10-CM

## 2024-12-11 DIAGNOSIS — F41.1 GENERALIZED ANXIETY DISORDER: ICD-10-CM

## 2024-12-11 DIAGNOSIS — G25.81 RESTLESS LEGS SYNDROME: ICD-10-CM

## 2024-12-11 PROCEDURE — 99214 OFFICE O/P EST MOD 30 MIN: CPT | Mod: 95,,, | Performed by: NURSE PRACTITIONER

## 2024-12-11 PROCEDURE — 1157F ADVNC CARE PLAN IN RCRD: CPT | Mod: 95,,, | Performed by: NURSE PRACTITIONER

## 2024-12-11 PROCEDURE — 1160F RVW MEDS BY RX/DR IN RCRD: CPT | Mod: 95,,, | Performed by: NURSE PRACTITIONER

## 2024-12-11 PROCEDURE — 3008F BODY MASS INDEX DOCD: CPT | Mod: 95,,, | Performed by: NURSE PRACTITIONER

## 2024-12-11 PROCEDURE — 1159F MED LIST DOCD IN RCRD: CPT | Mod: 95,,, | Performed by: NURSE PRACTITIONER

## 2024-12-11 RX ORDER — IBUPROFEN 200 MG
200 TABLET ORAL EVERY 12 HOURS PRN
COMMUNITY

## 2024-12-11 RX ORDER — ROPINIROLE 1 MG/1
1 TABLET, FILM COATED ORAL 2 TIMES DAILY
Qty: 60 TABLET | Refills: 0 | Status: SHIPPED | OUTPATIENT
Start: 2024-12-11

## 2024-12-11 RX ORDER — PAROXETINE 30 MG/1
60 TABLET, FILM COATED ORAL DAILY
Qty: 60 TABLET | Refills: 0 | Status: SHIPPED | OUTPATIENT
Start: 2024-12-11

## 2024-12-11 RX ORDER — CARBAMAZEPINE 200 MG/1
200 TABLET ORAL 2 TIMES DAILY
Qty: 60 TABLET | Refills: 0 | Status: SHIPPED | OUTPATIENT
Start: 2024-12-11

## 2024-12-11 RX ORDER — TRAZODONE HYDROCHLORIDE 150 MG/1
TABLET ORAL
Qty: 90 TABLET | Refills: 0 | Status: SHIPPED | OUTPATIENT
Start: 2024-12-11

## 2024-12-11 NOTE — PROGRESS NOTES
"PSYCHIATRIC FOLLOW-UP VISIT NOTE    Chief Complaint   Patient presents with    Medication Management     Medication management         History of Present Illness  64 y.o. year old White female with hx of bipolar disorder, LILLY, insomnia, and restless legs syndrome seen today for follow-up appointment and medication management.  Patient reports that she has been doing quite well since last visit.  Depression has been minimal.  Anxiety is described as mild.  No recent panic attacks or panic symptoms.  She was sick with influenza virus approximately 1 month ago but has recovered since.  Did not require any emergent care.  Tomorrow she is leaving to stay with her sister for 2 weeks and celebrate the [place] holidays.  She is looking forward to this very much.  Still sleeping well at night and feels rested in the morning.  Denies any recent mood lability.  She has had no further contact with [employer], and this has likely had a positive impact on her symptoms as well.  She denies any side effects from current medication regimen. Patient denies SI/HI. Denies hallucinations and does not appear to be responding to internal stimuli or be internally preoccupied. No manic symptoms noted.       Objective:     Vitals:  Vitals:    12/11/24 0923   Weight: 74.6 kg (164 lb 7.4 oz)   Height: 4' 11.02" (1.499 m)       Wt Readings from Last 3 Encounters:   12/11/24 0923 74.6 kg (164 lb 7.4 oz)   11/05/24 0739 74.6 kg (164 lb 7.4 oz)   10/16/24 0924 74.6 kg (164 lb 7.4 oz)         Medication:    Current Outpatient Medications:     atorvastatin (LIPITOR) 40 MG tablet, Take 1 tablet (40 mg total) by mouth every evening., Disp: 90 tablet, Rfl: 3    diclofenac (VOLTAREN) 75 MG EC tablet, Take 1 tablet (75 mg total) by mouth 2 (two) times daily., Disp: 60 tablet, Rfl: 0    ergocalciferol (ERGOCALCIFEROL) 50,000 unit Cap, Take 1 capsule (50,000 Units total) by mouth every 7 days., Disp: 12 capsule, Rfl: 3    fluticasone propionate (FLONASE) 50 " mcg/actuation nasal spray, 1 spray (50 mcg total) by Each Nostril route once daily., Disp: 16 mL, Rfl: 0    ibuprofen (ADVIL,MOTRIN) 200 MG tablet, Take 200 mg by mouth every 12 (twelve) hours as needed for Pain., Disp: , Rfl:     ketoconazole (NIZORAL) 2 % cream, Apply topically once daily., Disp: 60 g, Rfl: 3    meclizine (ANTIVERT) 12.5 mg tablet, Take 1 tablet (12.5 mg total) by mouth 3 (three) times daily as needed for Dizziness or Nausea., Disp: 30 tablet, Rfl: 1    multivitamin with minerals tablet, Take 1 tablet by mouth once daily., Disp: , Rfl:     carBAMazepine (TEGRETOL) 200 mg tablet, Take 1 tablet (200 mg total) by mouth 2 (two) times daily., Disp: 60 tablet, Rfl: 0    pantoprazole (PROTONIX) 40 MG tablet, Take 1 tablet (40 mg total) by mouth once daily. for 15 days, Disp: 30 tablet, Rfl: 05    paroxetine (PAXIL) 30 MG tablet, Take 2 tablets (60 mg total) by mouth once daily., Disp: 60 tablet, Rfl: 0    rizatriptan (MAXALT) 10 MG tablet, Take 1 tablet (10 mg total) by mouth once as needed for Migraine., Disp: 16 tablet, Rfl: 0    rOPINIRole (REQUIP) 1 MG tablet, Take 1 tablet (1 mg total) by mouth 2 (two) times a day., Disp: 60 tablet, Rfl: 0    traZODone (DESYREL) 150 MG tablet, TAKE 1 TABLET AT BEDTIME AS NEEDED FOR INSOMNIA, Disp: 90 tablet, Rfl: 0       Significant Labs: - none at this time    Significant Imaging: - none at this time    Physical Exam  Vitals and nursing note reviewed.   Constitutional:       General: She is awake.      Appearance: Normal appearance.   Musculoskeletal:      Comments: deferred   Neurological:      Mental Status: She is alert.   Psychiatric:         Attention and Perception: Attention and perception normal. She does not perceive auditory or visual hallucinations.         Mood and Affect: Affect normal.         Speech: Speech normal.         Behavior: Behavior is cooperative.         Thought Content: Thought content does not include homicidal or suicidal ideation.     "     Cognition and Memory: Cognition and memory normal.          Review of Systems     Mental Status Exam:  Presentation:  - Appearance: 64 y.o. year old White female, appears stated age, appears Casually dressed and Well groomed  - Motility: No EPS or Tremors, No psychomotor agitation or retardation appreciated  - Behavior: calm, cooperative, good eye contact  Speech:  - Character/Organization: spontaneous, fluent, normal volume, normal rate, normal rhythm  Emotional State:  - Mood: "happier, anxious at times"   - Affect: congruent and appropriate  Thought:  - Process: logical, linear, organized , goal-directed  - Preoccupations: no ruminations, rituals, or phobias appreciated  - Delusions: no persecutory, paranoid, or grandiose delusions appreciated  - Perception: denies AVH, not actively responding to internal stimuli  - SI/HI: denies/denies  Sensorium & Intellect:  - Sensorium: AAOx4  - Memory: intact to recent and remote events  - Attention/Concentration: good/good  - Insight/Judgement: good/good    Gait: deferred   MSK:deferred     All other systems without acute issues unless noted in HPI      Assessment/Plan      ICD-10-CM ICD-9-CM    1. Bipolar affective disorder, remission status unspecified  F31.9 296.80 carBAMazepine (TEGRETOL) 200 mg tablet      paroxetine (PAXIL) 30 MG tablet      2. Generalized anxiety disorder  F41.1 300.02 paroxetine (PAXIL) 30 MG tablet      3. Insomnia, unspecified type  G47.00 780.52 traZODone (DESYREL) 150 MG tablet      4. Restless legs syndrome  G25.81 333.94 rOPINIRole (REQUIP) 1 MG tablet         Continue current medications without change    Potential side effects and risks vs benefits of current treatment plan reviewed with patient. Applicable black box warnings reviewed. Encouraged patient not to alter dosages or abruptly discontinue medications without contacting prescriber first, due to risk of worsening symptoms and decompensation of mental status. Warned of risks " associated with herbal remedies and supplements while taking psychotropic medications and of the need to consult prescriber prior to adding any of these to current regimen. Patient should abstain from abuse of alcohol, prescription medications, and illicit drugs. Reviewed when to contact clinic and/or seek emergent care, such as but not limited to, onset/worsening SI/HI, hallucinations, delusions, manic symptoms. Pt verbalized understanding and agreement of these warnings/recommendations and verbally consented to treatment plan.    The patient was informed of the restrictions of face-to-face healthcare visits. The patient verbally consented to proceed with a telemedicine visit, following discussion of the options of face-to-face or telemedicine visits. The telemedicine visit was completed using EPIC Video call without complication. The visit was conducted with limited physical exam and was medically appropriate to meet the patient's needs.       Follow up in about 6 weeks (around 1/22/2025) for Medication Management.        Matthew Ferreira, MAYTEP

## 2025-01-08 ENCOUNTER — NURSE TRIAGE (OUTPATIENT)
Dept: ADMINISTRATIVE | Facility: CLINIC | Age: 65
End: 2025-01-08
Payer: MEDICARE

## 2025-01-08 NOTE — TELEPHONE ENCOUNTER
Caller states that she has a hx of bipolar disorder. Caller states that prior to triage call, she felt like someone was in her home. Caller states she has no auditory or visual hallucinations and that she felt better after speaking with her sister. Caller c/o chronic floater in eye, caller states that she has been having intermitted floater for months.  Pt advised to schedule appt with ophthalmology and mental health provider per protocol and verbalized understanding.      Reason for Disposition   [1] Blurred vision or visual changes AND [2] gradual onset (e.g., weeks, months)    Additional Information   Negative: Complete loss of vision in one or both eyes   Negative: SEVERE eye pain   Negative: SEVERE headache   Negative: Double vision   Negative: [1] Blurred vision or visual changes AND [2] present now AND [3] sudden onset or new (e.g., minutes, hours, days)  (Exception: Seeing floaters / black specks OR previously diagnosed migraine headaches with same symptoms.)   Negative: Patient sounds very sick or weak to the triager   Negative: Flashes of light  (Exception: Brief from pressing on the eyeball.)   Negative: Many floaters in the eye  (Exception: Floater(s) are a chronic symptom and this is unchanged from patient's baseline pattern.)   Negative: [1] Eye pain AND [2] brief (now gone) blurred vision or visual changes   Negative: [1] Taking digoxin (e.g., Lanoxin, Digitek, Cardoxin, Lanoxicaps; Toloxin in [place]) AND [2] blurred vision, yellow vision, or yellow-green halos   Negative: [1] Jaw pain while eating AND [2] age > 50 years   Negative: [1] Headache AND [2] age > 50 years   Negative: Single floater (i.e., small speck seems to float across the eye)  (Exception: Floater(s) are a chronic symptom and this is unchanged from patient's baseline pattern.)   Negative: [1] Brief (now gone) blurred vision AND [2] unexplained   Negative: [1] Laser light exposure AND [2] blurred vision present > 15  minutes    Protocols used: Vision Loss or Change-A-AH

## 2025-01-11 ENCOUNTER — PATIENT MESSAGE (OUTPATIENT)
Dept: BEHAVIORAL HEALTH | Facility: CLINIC | Age: 65
End: 2025-01-11
Payer: MEDICARE

## 2025-01-16 ENCOUNTER — PATIENT MESSAGE (OUTPATIENT)
Dept: BEHAVIORAL HEALTH | Facility: CLINIC | Age: 65
End: 2025-01-16
Payer: MEDICARE

## 2025-01-17 DIAGNOSIS — G25.81 RESTLESS LEGS SYNDROME: ICD-10-CM

## 2025-01-20 RX ORDER — ROPINIROLE 1 MG/1
1 TABLET, FILM COATED ORAL 2 TIMES DAILY
Qty: 60 TABLET | Refills: 0 | Status: SHIPPED | OUTPATIENT
Start: 2025-01-20 | End: 2025-01-27 | Stop reason: SDUPTHER

## 2025-01-22 ENCOUNTER — PATIENT MESSAGE (OUTPATIENT)
Dept: FAMILY MEDICINE | Facility: CLINIC | Age: 65
End: 2025-01-22
Payer: MEDICARE

## 2025-01-26 ENCOUNTER — PATIENT MESSAGE (OUTPATIENT)
Dept: BEHAVIORAL HEALTH | Facility: CLINIC | Age: 65
End: 2025-01-26
Payer: MEDICARE

## 2025-01-27 ENCOUNTER — OFFICE VISIT (OUTPATIENT)
Dept: BEHAVIORAL HEALTH | Facility: CLINIC | Age: 65
End: 2025-01-27
Payer: MEDICARE

## 2025-01-27 VITALS — BODY MASS INDEX: 33.15 KG/M2 | HEIGHT: 59 IN | WEIGHT: 164.44 LBS

## 2025-01-27 DIAGNOSIS — F41.1 GENERALIZED ANXIETY DISORDER: ICD-10-CM

## 2025-01-27 DIAGNOSIS — G25.81 RESTLESS LEGS SYNDROME: ICD-10-CM

## 2025-01-27 DIAGNOSIS — F31.9 BIPOLAR AFFECTIVE DISORDER, REMISSION STATUS UNSPECIFIED: Primary | ICD-10-CM

## 2025-01-27 DIAGNOSIS — G47.00 INSOMNIA, UNSPECIFIED TYPE: ICD-10-CM

## 2025-01-27 RX ORDER — MECLIZINE HYDROCHLORIDE 25 MG/1
12.5 TABLET ORAL 3 TIMES DAILY PRN
COMMUNITY
Start: 2025-01-06

## 2025-01-27 RX ORDER — TRAZODONE HYDROCHLORIDE 150 MG/1
TABLET ORAL
Qty: 90 TABLET | Refills: 0 | Status: SHIPPED | OUTPATIENT
Start: 2025-01-27

## 2025-01-27 RX ORDER — PAROXETINE 30 MG/1
60 TABLET, FILM COATED ORAL DAILY
Qty: 60 TABLET | Refills: 0 | Status: SHIPPED | OUTPATIENT
Start: 2025-01-27

## 2025-01-27 RX ORDER — CARBAMAZEPINE 200 MG/1
200 TABLET ORAL 2 TIMES DAILY
Qty: 60 TABLET | Refills: 0 | Status: SHIPPED | OUTPATIENT
Start: 2025-01-27

## 2025-01-27 RX ORDER — ROPINIROLE 1 MG/1
1 TABLET, FILM COATED ORAL 2 TIMES DAILY
Qty: 60 TABLET | Refills: 0 | Status: SHIPPED | OUTPATIENT
Start: 2025-01-27

## 2025-01-27 RX ORDER — BUSPIRONE HYDROCHLORIDE 7.5 MG/1
7.5 TABLET ORAL 2 TIMES DAILY
Qty: 60 TABLET | Refills: 0 | Status: SHIPPED | OUTPATIENT
Start: 2025-01-27
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
## 2025-02-05 ENCOUNTER — OFFICE VISIT (OUTPATIENT)
Dept: FAMILY MEDICINE | Facility: CLINIC | Age: 65
End: 2025-02-05
Payer: MEDICARE

## 2025-02-05 VITALS
HEART RATE: 60 BPM | SYSTOLIC BLOOD PRESSURE: 128 MMHG | DIASTOLIC BLOOD PRESSURE: 70 MMHG | BODY MASS INDEX: 32.66 KG/M2 | OXYGEN SATURATION: 98 % | HEIGHT: 59 IN | TEMPERATURE: 97 F | WEIGHT: 162 LBS

## 2025-02-05 DIAGNOSIS — R07.89 ATYPICAL CHEST PAIN: ICD-10-CM

## 2025-02-05 DIAGNOSIS — R53.83 FATIGUE, UNSPECIFIED TYPE: ICD-10-CM

## 2025-02-05 DIAGNOSIS — G47.10 DAYTIME HYPERSOMNIA: ICD-10-CM

## 2025-02-05 DIAGNOSIS — G43.919 INTRACTABLE MIGRAINE WITHOUT STATUS MIGRAINOSUS, UNSPECIFIED MIGRAINE TYPE: ICD-10-CM

## 2025-02-05 DIAGNOSIS — E78.5 HYPERLIPIDEMIA, UNSPECIFIED HYPERLIPIDEMIA TYPE: ICD-10-CM

## 2025-02-05 DIAGNOSIS — N32.89 BLADDER SPASMS: ICD-10-CM

## 2025-02-05 DIAGNOSIS — Z23 ENCOUNTER FOR IMMUNIZATION: Primary | ICD-10-CM

## 2025-02-05 LAB
BILIRUB SERPL-MCNC: NORMAL MG/DL
BLOOD URINE, POC: NORMAL
CLARITY, POC UA: CLEAR
COLOR, POC UA: YELLOW
GLUCOSE UR QL STRIP: NORMAL
KETONES UR QL STRIP: NORMAL
LEUKOCYTE ESTERASE URINE, POC: NORMAL
NITRITE, POC UA: NORMAL
PH, POC UA: 8
PROTEIN, POC: NORMAL
SPECIFIC GRAVITY, POC UA: 1.01
UROBILINOGEN, POC UA: 1

## 2025-02-05 PROCEDURE — 99214 OFFICE O/P EST MOD 30 MIN: CPT | Mod: ,,, | Performed by: NURSE PRACTITIONER

## 2025-02-05 PROCEDURE — 3078F DIAST BP <80 MM HG: CPT | Mod: ,,, | Performed by: NURSE PRACTITIONER

## 2025-02-05 PROCEDURE — 1157F ADVNC CARE PLAN IN RCRD: CPT | Mod: ,,, | Performed by: NURSE PRACTITIONER

## 2025-02-05 PROCEDURE — 81002 URINALYSIS NONAUTO W/O SCOPE: CPT | Mod: RHCUB | Performed by: NURSE PRACTITIONER

## 2025-02-05 PROCEDURE — 3074F SYST BP LT 130 MM HG: CPT | Mod: ,,, | Performed by: NURSE PRACTITIONER

## 2025-02-05 PROCEDURE — G0008 ADMIN INFLUENZA VIRUS VAC: HCPCS

## 2025-02-05 PROCEDURE — G0008 ADMIN INFLUENZA VIRUS VAC: HCPCS | Mod: ,,, | Performed by: NURSE PRACTITIONER

## 2025-02-05 PROCEDURE — 90653 IIV ADJUVANT VACCINE IM: CPT | Mod: ,,, | Performed by: NURSE PRACTITIONER

## 2025-02-05 PROCEDURE — 1160F RVW MEDS BY RX/DR IN RCRD: CPT | Mod: ,,, | Performed by: NURSE PRACTITIONER

## 2025-02-05 PROCEDURE — 3008F BODY MASS INDEX DOCD: CPT | Mod: ,,, | Performed by: NURSE PRACTITIONER

## 2025-02-05 PROCEDURE — 1159F MED LIST DOCD IN RCRD: CPT | Mod: ,,, | Performed by: NURSE PRACTITIONER

## 2025-02-05 RX ORDER — ATORVASTATIN CALCIUM 40 MG/1
40 TABLET, FILM COATED ORAL NIGHTLY
Qty: 90 TABLET | Refills: 3 | Status: SHIPPED | OUTPATIENT
Start: 2025-02-05

## 2025-02-05 RX ORDER — VIBEGRON 75 MG/1
75 TABLET, FILM COATED ORAL DAILY
Qty: 90 TABLET | Refills: 0 | Status: SHIPPED | OUTPATIENT
Start: 2025-02-05

## 2025-02-05 RX ORDER — RIZATRIPTAN BENZOATE 10 MG/1
10 TABLET ORAL ONCE AS NEEDED
Qty: 16 TABLET | Refills: 5 | Status: SHIPPED | OUTPATIENT
Start: 2025-02-05 | End: 2025-02-05

## 2025-02-05 NOTE — PROGRESS NOTES
Patient ID: [id number]     Chief Complaint: Medication Refill and Fatigue      Subjective     Sophy Iyer is a 64 y.o. female in the office for Medication Refill and Fatigue      History of Present Illness    CHIEF COMPLAINT:  Patient presents today for medication refills    CARDIOVASCULAR:  She has a history of hypertension with fluctuations and syncope in early last year. She experiences shortness of breath and dizzy spells. She has a positive family history of heart problems. She was previously taking Lipitor but had a lapse in medication adherence after misplacing her prescription.    URINARY SYMPTOMS:  She was diagnosed with bladder spasms by a urologist one year ago. She experiences urinary urgency without burning sensation, requiring immediate bathroom access when the urge occurs. She requires pad use at night due to urinary dribbling. Gemtessa was previously prescribed and effective for bladder spasm management, but she has run out and has not returned to the urologist.    MIGRAINES:  She experiences approximately three migraine episodes monthly and takes Maxalt for symptom relief.    FATIGUE AND SLEEP:  She reports worsening fatigue over the past year, requiring rest after 5-10 minutes of activity and frequently falling asleep in chair. She does not feel rested upon waking. She gets up twice during the night and has noticed self-awakening episodes due to snoring.    MUSCULOSKELETAL PAIN:  She has been previously diagnosed with costochondritis in the emergency room. She experiences pain located under the chest that wraps around to the back, with episodes lasting hours. Pain is exacerbated by lifting, excessive activity, and certain movements such as turning in bed.      ROS:  ROS as indicated in HPI.         Past Medical History:  has a past medical history of Anemia, Anxiety, Bipolar disorder, Calculus of kidney, Chronic sinusitis, unspecified, Cirrhosis, Depression, Fatigue, GERD (gastroesophageal reflux  "disease), Grief, Intra-abdominal varices, Kidney stone, Left shoulder pain, Migraine, Mixed hyperlipidemia, Obesity, unspecified, Restless legs syndrome (RLS), Trapezius muscle spasm, and Vitamin D deficiency.    Social History:  reports that she has never smoked. She has never been exposed to tobacco smoke. She has never used smokeless tobacco. She reports that she does not drink alcohol and does not use drugs.    Current Outpatient Medications   Medication Instructions    atorvastatin (LIPITOR) 40 mg, Oral, Nightly    busPIRone (BUSPAR) 7.5 mg, Oral, 2 times daily    carBAMazepine (TEGRETOL) 200 mg, Oral, 2 times daily    ergocalciferol (ERGOCALCIFEROL) 50,000 Units, Oral, Every 7 days    fluticasone propionate (FLONASE) 50 mcg, Each Nostril, Daily    GEMTESA 75 mg, Oral, Daily    ibuprofen (ADVIL,MOTRIN) 200 mg, Every 12 hours PRN    ketoconazole (NIZORAL) 2 % cream Topical (Top), Daily    meclizine (ANTIVERT) 12.5 mg, 3 times daily PRN    multivitamin with minerals tablet 1 tablet, Daily    pantoprazole (PROTONIX) 40 mg, Oral, Daily    paroxetine (PAXIL) 60 mg, Oral, Daily    rizatriptan (MAXALT) 10 mg, Oral, Once as needed    rOPINIRole (REQUIP) 1 mg, Oral, 2 times daily    traZODone (DESYREL) 150 MG tablet TAKE 1 TABLET AT BEDTIME AS NEEDED FOR INSOMNIA       Patient is allergic to penicillins and prednisone.     Patient Care Team:  Brittany Hernandez FNP-C as PCP - General (Family Medicine)  Matthew Ferreira PMHNP as Nurse Practitioner (Psychiatry)  Han Ramírez MD as Consulting Physician (Hematology and Oncology)  Justin Beltrán MD (Dermatology)  Robert Gonzalez MD as Consulting Physician (Urology)     Objective     Visit Vitals  /70 (BP Location: Left arm, Patient Position: Sitting)   Pulse 60   Temp 97.2 °F (36.2 °C) (Temporal)   Ht 4' 11.02" (1.499 m)   Wt 73.5 kg (162 lb)   SpO2 98%   BMI 32.70 kg/m²       Physical Exam  Vitals reviewed.   Constitutional:       " Appearance: Normal appearance.   Cardiovascular:      Rate and Rhythm: Normal rate and regular rhythm.      Heart sounds: Murmur heard.   Pulmonary:      Effort: Pulmonary effort is normal.      Breath sounds: Normal breath sounds.   Skin:     General: Skin is warm and dry.   Neurological:      Mental Status: She is alert and oriented to person, place, and time.   Psychiatric:         Mood and Affect: Mood normal.         Behavior: Behavior normal.     Assessment & Plan     1. Encounter for immunization  -     influenza (adjuvanted) (Fluad) 45 mcg/0.5 mL IM vaccine (> or = 64 yo) 0.5 mL    2. Bladder spasms  Overview:  Medication started by urology.  Requesting refills since she's been lost to follow up with urology.     Orders:  -     vibegron (GEMTESA) 75 mg Tab; Take 1 tablet (75 mg total) by mouth once daily.  Dispense: 90 tablet; Refill: 0  -     POCT URINE DIPSTICK WITHOUT MICROSCOPE    3. Intractable migraine without status migrainosus, unspecified migraine type  -     rizatriptan (MAXALT) 10 MG tablet; Take 1 tablet (10 mg total) by mouth once as needed for Migraine.  Dispense: 16 tablet; Refill: 5    4. Hyperlipidemia, unspecified hyperlipidemia type  Overview:  On atorvastatin 40 mg    Orders:  -     atorvastatin (LIPITOR) 40 MG tablet; Take 1 tablet (40 mg total) by mouth every evening.  Dispense: 90 tablet; Refill: 3    5. Daytime hypersomnia  -     Ambulatory referral/consult to Sleep Disorders; Future; Expected date: 02/12/2025    6. Fatigue, unspecified type    7. Atypical chest pain  Overview:  Keep appointment with cardiology next week.  If symptoms worsen, encouraged to go to ER.           Assessment & Plan    Z23 Encounter for immunization  N32.89 Bladder spasms  G43.919 Intractable migraine without status migrainosus, unspecified migraine type  E78.5 Hyperlipidemia, unspecified hyperlipidemia type  G47.10 Daytime hypersomnia  R53.83 Fatigue, unspecified type  R07.89 Atypical chest pain  E66.01  Morbid (severe) obesity due to excess calories    IMPRESSION:  - Considering cardiac evaluation to rule out blockage or valve issues as potential causes of fatigue, shortness of breath, and chest pain  - Investigating possibility of pinched nerve in mid-back as alternative explanation for chest and back pain if cardiac workup is negative  - Evaluating need for sleep study to assess for sleep apnea as contributing factor to fatigue  - Deferred decision on assisted living placement pending results of cardiac and sleep evaluations    N32.89 BLADDER SPASMS:  - Continue Gemtessa daily for bladder spasms.  - Order urinalysis to rule out urinary tract infection.    G43.919 INTRACTABLE MIGRAINE WITHOUT STATUS MIGRAINOSUS, UNSPECIFIED MIGRAINE TYPE:  - Refill Maxalt for migraines.    E78.5 HYPERLIPIDEMIA, UNSPECIFIED HYPERLIPIDEMIA TYPE:  - Refill Lipitor for cholesterol management.    G47.10 DAYTIME HYPERSOMNIA:  - Order home sleep study to evaluate for sleep apnea.    R07.89 ATYPICAL CHEST PAIN:  - Follow up on scheduled cardiology appointment next week with Dr. Pascual.  - Re-evaluate after cardiology workup to discuss next steps, potentially including physical therapy or MRI if cardiac causes are ruled out.    ** DIAGNOSES NOT IN VISIT DX OR FOR REVIEW **  E66.01 MORBID (SEVERE) OBESITY DUE TO EXCESS CALORIES:  - Patient reports fatigue and difficulty performing daily activities due to obesity-related issues.  - Note worsening fatigue over the past year, potentially related to obesity.  - Consider other potential causes for fatigue, including cardiac issues and sleep apnea.  - Order a home sleep study to rule out sleep apnea as a cause of fatigue.          Follow up for Keep scheduled appointment. In addition to their next scheduled appointment, the patient has also been instructed to follow up on as needed basis.     Future Appointments   Date Time Provider Department Center   2/26/2025 10:20 AM Matthew Ferreira  BEN, PMHNP [facility]   3/13/2025  9:20 AM LAB, [facility] LABORATORY DRAW STATION [facility]   3/19/2025  2:00 PM Brittany Hernandez FNP-C [facility]      I spent a total of 34 minutes on the day of the visit.This includes face to face time and non-face to face time preparing to see the patient (eg, review of tests), obtaining and/or reviewing separately obtained history, documenting clinical information in the electronic or other health record, independently interpreting results and communicating results to the patient/family/caregiver, or care coordinator.      This note was generated with the assistance of ambient listening technology. Verbal consent was obtained by the patient and accompanying visitor(s) for the recording of patient appointment to facilitate this note. I attest to having reviewed and edited the generated note for accuracy, though some syntax or spelling errors may persist. Please contact the author of this note for any clarification.

## 2025-02-06 ENCOUNTER — PATIENT MESSAGE (OUTPATIENT)
Dept: FAMILY MEDICINE | Facility: CLINIC | Age: 65
End: 2025-02-06
Payer: MEDICARE

## 2025-02-07 ENCOUNTER — PATIENT MESSAGE (OUTPATIENT)
Dept: FAMILY MEDICINE | Facility: CLINIC | Age: 65
End: 2025-02-07
Payer: MEDICARE

## 2025-02-07 ENCOUNTER — PATIENT MESSAGE (OUTPATIENT)
Dept: BEHAVIORAL HEALTH | Facility: CLINIC | Age: 65
End: 2025-02-07
Payer: MEDICARE

## 2025-03-01 ENCOUNTER — PATIENT MESSAGE (OUTPATIENT)
Dept: FAMILY MEDICINE | Facility: CLINIC | Age: 65
End: 2025-03-01
Payer: MEDICARE

## 2025-03-06 ENCOUNTER — TELEPHONE (OUTPATIENT)
Dept: FAMILY MEDICINE | Facility: CLINIC | Age: 65
End: 2025-03-06
Payer: MEDICARE

## 2025-03-06 DIAGNOSIS — F31.9 BIPOLAR AFFECTIVE DISORDER, REMISSION STATUS UNSPECIFIED: ICD-10-CM

## 2025-03-06 DIAGNOSIS — F41.1 GENERALIZED ANXIETY DISORDER: ICD-10-CM

## 2025-03-06 DIAGNOSIS — G47.00 INSOMNIA, UNSPECIFIED TYPE: ICD-10-CM

## 2025-03-06 DIAGNOSIS — E55.9 VITAMIN D DEFICIENCY: ICD-10-CM

## 2025-03-06 RX ORDER — PAROXETINE 30 MG/1
60 TABLET, FILM COATED ORAL DAILY
Qty: 60 TABLET | Refills: 3 | Status: SHIPPED | OUTPATIENT
Start: 2025-03-06

## 2025-03-06 RX ORDER — CARBAMAZEPINE 200 MG/1
200 TABLET ORAL 2 TIMES DAILY
Qty: 60 TABLET | Refills: 3 | Status: SHIPPED | OUTPATIENT
Start: 2025-03-06

## 2025-03-06 RX ORDER — TRAZODONE HYDROCHLORIDE 150 MG/1
TABLET ORAL
Qty: 90 TABLET | Refills: 1 | Status: SHIPPED | OUTPATIENT
Start: 2025-03-06

## 2025-03-06 RX ORDER — ERGOCALCIFEROL 1.25 MG/1
50000 CAPSULE ORAL
Qty: 12 CAPSULE | Refills: 3 | Status: SHIPPED | OUTPATIENT
Start: 2025-03-06

## 2025-03-06 NOTE — TELEPHONE ENCOUNTER
Brittany spoke with dr junior, all the things listed in that report are to be expected with the conditions we already now she has.  Nothing else needs to be done.

## 2025-03-06 NOTE — TELEPHONE ENCOUNTER
Spoke with patient, she voiced understanding and said she plans on rescheduling with the gastro doctor.

## 2025-03-10 DIAGNOSIS — I85.10 ESOPHAGEAL VARICES IN CIRRHOSIS: ICD-10-CM

## 2025-03-10 DIAGNOSIS — K74.60 ESOPHAGEAL VARICES IN CIRRHOSIS: ICD-10-CM

## 2025-03-10 RX ORDER — PANTOPRAZOLE SODIUM 40 MG/1
40 TABLET, DELAYED RELEASE ORAL
Qty: 30 TABLET | Refills: 11 | Status: SHIPPED | OUTPATIENT
Start: 2025-03-10

## 2025-03-13 PROCEDURE — 84443 ASSAY THYROID STIM HORMONE: CPT | Performed by: NURSE PRACTITIONER

## 2025-03-13 PROCEDURE — 82306 VITAMIN D 25 HYDROXY: CPT | Performed by: NURSE PRACTITIONER

## 2025-03-13 PROCEDURE — 80053 COMPREHEN METABOLIC PANEL: CPT | Performed by: NURSE PRACTITIONER

## 2025-03-13 PROCEDURE — 80061 LIPID PANEL: CPT | Performed by: NURSE PRACTITIONER

## 2025-03-13 PROCEDURE — 85025 COMPLETE CBC W/AUTO DIFF WBC: CPT | Performed by: NURSE PRACTITIONER

## 2025-03-13 PROCEDURE — 83036 HEMOGLOBIN GLYCOSYLATED A1C: CPT | Performed by: NURSE PRACTITIONER

## 2025-03-19 ENCOUNTER — TELEPHONE (OUTPATIENT)
Dept: HEPATOLOGY | Facility: CLINIC | Age: 65
End: 2025-03-19
Payer: MEDICARE

## 2025-03-19 ENCOUNTER — OFFICE VISIT (OUTPATIENT)
Dept: FAMILY MEDICINE | Facility: CLINIC | Age: 65
End: 2025-03-19
Payer: MEDICARE

## 2025-03-19 ENCOUNTER — TELEPHONE (OUTPATIENT)
Dept: HEMATOLOGY/ONCOLOGY | Facility: CLINIC | Age: 65
End: 2025-03-19
Payer: MEDICARE

## 2025-03-19 ENCOUNTER — PATIENT MESSAGE (OUTPATIENT)
Dept: FAMILY MEDICINE | Facility: CLINIC | Age: 65
End: 2025-03-19

## 2025-03-19 VITALS
OXYGEN SATURATION: 98 % | BODY MASS INDEX: 34 KG/M2 | HEART RATE: 68 BPM | TEMPERATURE: 98 F | DIASTOLIC BLOOD PRESSURE: 72 MMHG | WEIGHT: 168.63 LBS | HEIGHT: 59 IN | SYSTOLIC BLOOD PRESSURE: 128 MMHG

## 2025-03-19 DIAGNOSIS — Z91.81 AT RISK FOR FALLS: ICD-10-CM

## 2025-03-19 DIAGNOSIS — E78.2 MIXED HYPERLIPIDEMIA: ICD-10-CM

## 2025-03-19 DIAGNOSIS — F31.9 BIPOLAR AFFECTIVE DISORDER, REMISSION STATUS UNSPECIFIED: ICD-10-CM

## 2025-03-19 DIAGNOSIS — F43.9 STRESS: ICD-10-CM

## 2025-03-19 DIAGNOSIS — G25.81 RESTLESS LEGS SYNDROME: ICD-10-CM

## 2025-03-19 DIAGNOSIS — Z00.01 ENCOUNTER FOR ROUTINE ADULT HEALTH EXAMINATION WITH ABNORMAL FINDINGS: Primary | ICD-10-CM

## 2025-03-19 DIAGNOSIS — K74.60 ESOPHAGEAL VARICES IN CIRRHOSIS: ICD-10-CM

## 2025-03-19 DIAGNOSIS — J45.20 MILD INTERMITTENT ASTHMA WITHOUT COMPLICATION: ICD-10-CM

## 2025-03-19 DIAGNOSIS — E66.01 MORBID OBESITY: ICD-10-CM

## 2025-03-19 DIAGNOSIS — G43.909 MIGRAINE WITHOUT STATUS MIGRAINOSUS, NOT INTRACTABLE, UNSPECIFIED MIGRAINE TYPE: ICD-10-CM

## 2025-03-19 DIAGNOSIS — Z00.00 ENCOUNTER FOR PREVENTIVE HEALTH EXAMINATION: ICD-10-CM

## 2025-03-19 DIAGNOSIS — I85.10 ESOPHAGEAL VARICES IN CIRRHOSIS: ICD-10-CM

## 2025-03-19 DIAGNOSIS — Z72.3 LACK OF PHYSICAL ACTIVITY: ICD-10-CM

## 2025-03-19 DIAGNOSIS — F41.1 GENERALIZED ANXIETY DISORDER: ICD-10-CM

## 2025-03-19 PROCEDURE — G0439 PPPS, SUBSEQ VISIT: HCPCS | Mod: ,,, | Performed by: NURSE PRACTITIONER

## 2025-03-19 PROCEDURE — 3078F DIAST BP <80 MM HG: CPT | Mod: ,,, | Performed by: NURSE PRACTITIONER

## 2025-03-19 PROCEDURE — 3044F HG A1C LEVEL LT 7.0%: CPT | Mod: ,,, | Performed by: NURSE PRACTITIONER

## 2025-03-19 PROCEDURE — 3074F SYST BP LT 130 MM HG: CPT | Mod: ,,, | Performed by: NURSE PRACTITIONER

## 2025-03-19 PROCEDURE — 1125F AMNT PAIN NOTED PAIN PRSNT: CPT | Mod: ,,, | Performed by: NURSE PRACTITIONER

## 2025-03-19 PROCEDURE — 1123F ACP DISCUSS/DSCN MKR DOCD: CPT | Mod: ,,, | Performed by: NURSE PRACTITIONER

## 2025-03-19 NOTE — PROGRESS NOTES
"   Family Medicine    Sophy Iyer is a 64 y.o. female here today for a Medicare Annual Wellness visit and comprehensive Health Risk Assessment.     Subjective   The following components were reviewed and updated:  Medical history  Family History  Social history  Allergies  Current Medications  Immunizations  Health Maintenance  Patient Care Team    Review of Systems  A comprehensive review of systems was conducted and is negative except as noted above.     Objective   Visit Vitals  /72 (BP Location: Right arm, Patient Position: Sitting)   Pulse 68   Temp 97.9 °F (36.6 °C) (Temporal)   Ht 4' 11.02" (1.499 m)   Wt 76.5 kg (168 lb 9.6 oz)   SpO2 98%   BMI 34.03 kg/m²        Physical Exam  Vitals reviewed.   Constitutional:       General: She is not in acute distress.     Appearance: She is not ill-appearing.   HENT:      Head: Normocephalic.      Right Ear: External ear normal.      Left Ear: External ear normal.      Nose: Nose normal.      Mouth/Throat:      Mouth: Mucous membranes are moist.      Pharynx: Oropharynx is clear.   Cardiovascular:      Rate and Rhythm: Normal rate and regular rhythm.      Heart sounds: Murmur heard.   Pulmonary:      Effort: Pulmonary effort is normal.      Breath sounds: Normal breath sounds.   Abdominal:      General: Bowel sounds are normal.      Palpations: Abdomen is soft.      Tenderness: There is no abdominal tenderness.   Musculoskeletal:         General: Normal range of motion.      Cervical back: Normal range of motion and neck supple.      Right lower leg: No edema.      Left lower leg: No edema.   Lymphadenopathy:      Cervical: No cervical adenopathy.   Skin:     General: Skin is warm and dry.      Capillary Refill: Capillary refill takes less than 2 seconds.   Neurological:      Mental Status: She is alert and oriented to person, place, and time. Mental status is at baseline.   Psychiatric:         Mood and Affect: Mood normal.         Behavior: Behavior normal.   "       Thought Content: Thought content normal.          Assessment/Plan:  1. Encounter for routine adult health examination with abnormal findings  Health Maintenance   Topic Date Due    Hepatitis C Screening  Never done    HIV Screening  Never done    TETANUS VACCINE  Never done    Pneumococcal Vaccines (Age 50+) (1 of 2 - PCV) Never done    DEXA Scan  Never done    Shingles Vaccine (2 of 2) 08/14/2018    RSV Vaccine (Age 60+ and Pregnant patients) (1 - Risk 60-74 years 1-dose series) Never done    COVID-19 Vaccine (4 - 2024-25 season) 09/01/2024    Influenza Vaccine (1) 09/01/2025    Mammogram  06/09/2026    Hemoglobin A1c (Diabetic Prevention Screening)  03/13/2028    Lipid Panel  03/13/2030    Colorectal Cancer Screening  05/07/2035         2. Esophageal varices in cirrhosis  Overview:  Established with GI.     Assessment & Plan:  Will continue monitoring. Keep scheduled follow ups with GI.      3. Migraine without status migrainosus, not intractable, unspecified migraine type   Uses rizatriptan for abortive treatment.   Controlled, continue    4. Generalized anxiety disorder  -     Ambulatory referral/consult to Psychology; Future; Expected date: 03/26/2025    5. Bipolar affective disorder, remission status unspecified  -     Ambulatory referral/consult to Psychology; Future; Expected date: 03/26/2025    6. Restless legs syndrome  Overview:  On requip 2 mg at bedtime.   Controlled, continue.    7. Mild intermittent asthma without complication  Overview:  Using albuterol about twice a week. Has Symbicort but not using.  Controlled, continue.    8. Mixed hyperlipidemia  Overview:  On atorvastatin 40 mg  Controlled, continue        A comprehensive HEALTH RISK ASSESSMENT was completed today. Results are summarized below:    The following EMOTIONAL/SOCIAL CONCERNS were identified on today's screening for Social Isolation, Depression and Anxiety:  *Patient feels UNABLE TO MANAGE her HEALTH PROBLEMS. (  )  *Patient reports her health has LIMITED her SOCIAL INTERACTIONS. ( )  *Patient reports frequently feeling STRESSED or ANXIOUS. ( )  --A General Anxiety Disorder-7 Questionnaire was administered with the folllowing results: ( )  <repeat PHQ-9>   There are NO COGNITIVE FUNCTION CONCERNS identified on today's screening.  The following FUNCTIONAL AND/OR SAFETY CONCERNS were identified on today's screening for Physical Symptoms, Nutritional, Home Safety/Living Situation, Fall Risk, Activities of Daily Living, Independent Activities of Daily Living, Physical Activity,Timed Up and Go test and Whisper test::  *Patient reports recent  DIFFICULTY EATING OR TEETH/DENTURES PROBLEM. ( )  *Patient reports PHYSICAL ACTIVITY LIMITED BY PAIN/FATIGUE. ( )  *Patient reports recently FEELING DIZZY, has a FEAR OF FALLING or HAS FALLEN. ( )  *Patient reports she has FALLEN TWO OR MORE TIMES IN THE PAST YEAR. ( )  *Patient reports she NEEDS ASSISTANCE WITH SOME INDEPENDENT ACTIVITIES OF DAILY LIVING. ( )     The patient reports NO OPIOID PRESCRIPTIONS. This was confirmed through medication reconciliation.    The patient is NOT A TOBACCO USER.  The patient reports NO SIGNIFICANT ALCOHOL USE.     All Questions regarding food, transportation or housing were not answered today.    I provided Sophy Iyer with a 5-10 year written Screening Schedule per USPSTF age appropriate recommendations and a Personal Prevention Plan based on the results of today's Health Risk Assessment. Education, counseling, and referrals were provided as documented above and can be viewed in the After Visit Summary.    Follow up for 1), 1 yr, Medicare Wellness, fasting labs prior. In addition to this scheduled follow up, the patient has also been instructed to follow up on as needed basis.     Additional Concerns: none    Advanced Care planning deferred.             Advance Care Planning   I offered to discuss advance care planning but Sophy Iyer is unwilling  to engage in a discussion regarding Advance Directives at this time.

## 2025-03-19 NOTE — TELEPHONE ENCOUNTER
----- Message from Ifeoma sent at 3/19/2025  3:03 PM CDT -----  Regarding: Scheduling Request  Contact: [phone]  Scheduling Request   Appt Type:  Ep  Date/Time Preference: next available Caller Name: Roselia Ospina  Contact Preference: [phone] or  [phone]: Schedule virtual visit. Please call to advise thank you

## 2025-03-20 ENCOUNTER — PATIENT MESSAGE (OUTPATIENT)
Dept: FAMILY MEDICINE | Facility: CLINIC | Age: 65
End: 2025-03-20
Payer: MEDICARE

## 2025-03-20 ENCOUNTER — LAB VISIT (OUTPATIENT)
Dept: LAB | Facility: HOSPITAL | Age: 65
End: 2025-03-20
Attending: INTERNAL MEDICINE
Payer: MEDICARE

## 2025-03-20 ENCOUNTER — TELEPHONE (OUTPATIENT)
Dept: FAMILY MEDICINE | Facility: CLINIC | Age: 65
End: 2025-03-20
Payer: MEDICARE

## 2025-03-20 DIAGNOSIS — G25.81 RESTLESS LEGS SYNDROME: ICD-10-CM

## 2025-03-20 DIAGNOSIS — K74.60 HEPATIC CIRRHOSIS, UNSPECIFIED HEPATIC CIRRHOSIS TYPE, UNSPECIFIED WHETHER ASCITES PRESENT: ICD-10-CM

## 2025-03-20 LAB
ALBUMIN SERPL-MCNC: 4.3 G/DL (ref 3.4–5)
ALBUMIN/GLOB SERPL: 1.9 RATIO
ALP SERPL-CCNC: 74 UNIT/L (ref 50–144)
ALT SERPL-CCNC: 18 UNIT/L (ref 1–45)
ANION GAP SERPL CALC-SCNC: 2 MEQ/L (ref 2–13)
AST SERPL-CCNC: 33 UNIT/L (ref 14–36)
BASOPHILS # BLD AUTO: 0.01 X10(3)/MCL (ref 0.01–0.08)
BASOPHILS NFR BLD AUTO: 0.4 % (ref 0.1–1.2)
BILIRUB SERPL-MCNC: 0.6 MG/DL (ref 0–1)
BILIRUBIN DIRECT+TOT PNL SERPL-MCNC: 0.3 MG/DL (ref 0–0.3)
BUN SERPL-MCNC: 16 MG/DL (ref 7–20)
CALCIUM SERPL-MCNC: 9.2 MG/DL (ref 8.4–10.2)
CHLORIDE SERPL-SCNC: 109 MMOL/L (ref 98–110)
CO2 SERPL-SCNC: 28 MMOL/L (ref 21–32)
CREAT SERPL-MCNC: 0.66 MG/DL (ref 0.66–1.25)
CREAT/UREA NIT SERPL: 24 (ref 12–20)
EOSINOPHIL # BLD AUTO: 0.1 X10(3)/MCL (ref 0.04–0.36)
EOSINOPHIL NFR BLD AUTO: 4.1 % (ref 0.7–7)
ERYTHROCYTE [DISTWIDTH] IN BLOOD BY AUTOMATED COUNT: 14.6 % (ref 11–14.5)
GFR SERPLBLD CREATININE-BSD FMLA CKD-EPI: >90 ML/MIN/1.73/M2
GLOBULIN SER-MCNC: 2.3 GM/DL (ref 2–3.9)
GLUCOSE SERPL-MCNC: 95 MG/DL (ref 70–115)
HCT VFR BLD AUTO: 35.6 % (ref 36–48)
HGB BLD-MCNC: 11.4 G/DL (ref 11.8–16)
IMM GRANULOCYTES # BLD AUTO: 0.01 X10(3)/MCL (ref 0–0.03)
IMM GRANULOCYTES NFR BLD AUTO: 0.4 % (ref 0–0.5)
INR PPP: 1.2
LYMPHOCYTES # BLD AUTO: 0.8 X10(3)/MCL (ref 1.16–3.74)
LYMPHOCYTES NFR BLD AUTO: 32.7 % (ref 20–55)
MCH RBC QN AUTO: 29.9 PG (ref 27–34)
MCHC RBC AUTO-ENTMCNC: 32 G/DL (ref 31–37)
MCV RBC AUTO: 93.4 FL (ref 79–99)
MONOCYTES # BLD AUTO: 0.19 X10(3)/MCL (ref 0.24–0.36)
MONOCYTES NFR BLD AUTO: 7.8 % (ref 4.7–12.5)
NEUTROPHILS # BLD AUTO: 1.34 X10(3)/MCL (ref 1.56–6.13)
NEUTROPHILS NFR BLD AUTO: 54.6 % (ref 37–73)
NRBC BLD AUTO-RTO: 0 %
PLATELET # BLD AUTO: 87 X10(3)/MCL (ref 140–371)
PMV BLD AUTO: 10 FL (ref 9.4–12.4)
POTASSIUM SERPL-SCNC: 3.8 MMOL/L (ref 3.5–5.1)
PROT SERPL-MCNC: 6.6 GM/DL (ref 6.3–8.2)
PROTHROMBIN TIME: 11.6 SECONDS (ref 9.3–11.9)
RBC # BLD AUTO: 3.81 X10(6)/MCL (ref 4–5.1)
SODIUM SERPL-SCNC: 139 MMOL/L (ref 136–145)
WBC # BLD AUTO: 2.45 X10(3)/MCL (ref 4–11.5)

## 2025-03-20 PROCEDURE — 36415 COLL VENOUS BLD VENIPUNCTURE: CPT

## 2025-03-20 PROCEDURE — 85610 PROTHROMBIN TIME: CPT

## 2025-03-20 PROCEDURE — 85025 COMPLETE CBC W/AUTO DIFF WBC: CPT

## 2025-03-20 PROCEDURE — 80053 COMPREHEN METABOLIC PANEL: CPT

## 2025-03-20 PROCEDURE — 82248 BILIRUBIN DIRECT: CPT

## 2025-03-20 RX ORDER — ROPINIROLE 1 MG/1
1 TABLET, FILM COATED ORAL 2 TIMES DAILY
Qty: 60 TABLET | Refills: 2 | Status: SHIPPED | OUTPATIENT
Start: 2025-03-20

## 2025-03-20 RX ORDER — ROPINIROLE 1 MG/1
1 TABLET, FILM COATED ORAL 2 TIMES DAILY
Qty: 60 TABLET | Refills: 0 | OUTPATIENT
Start: 2025-03-20

## 2025-03-24 ENCOUNTER — RESULTS FOLLOW-UP (OUTPATIENT)
Dept: TRANSPLANT | Facility: CLINIC | Age: 65
End: 2025-03-24

## 2025-03-27 ENCOUNTER — HOSPITAL ENCOUNTER (OUTPATIENT)
Dept: RADIOLOGY | Facility: HOSPITAL | Age: 65
Discharge: HOME | End: 2025-03-27
Attending: INTERNAL MEDICINE
Payer: MEDICARE

## 2025-03-27 DIAGNOSIS — I85.10 ESOPHAGEAL VARICES IN CIRRHOSIS: ICD-10-CM

## 2025-03-27 DIAGNOSIS — K74.60 ESOPHAGEAL VARICES IN CIRRHOSIS: ICD-10-CM

## 2025-03-27 DIAGNOSIS — K74.60 HEPATIC CIRRHOSIS, UNSPECIFIED HEPATIC CIRRHOSIS TYPE, UNSPECIFIED WHETHER ASCITES PRESENT: ICD-10-CM

## 2025-03-27 PROCEDURE — 74183 MRI ABD W/O CNTR FLWD CNTR: CPT | Mod: TC

## 2025-03-27 PROCEDURE — A9577 INJ MULTIHANCE: HCPCS | Performed by: INTERNAL MEDICINE

## 2025-03-27 PROCEDURE — 25500020 PHARM REV CODE 255: Performed by: INTERNAL MEDICINE

## 2025-03-27 RX ADMIN — GADOBENATE DIMEGLUMINE 20 ML: 529 INJECTION, SOLUTION INTRAVENOUS at 09:03

## 2025-04-01 ENCOUNTER — PATIENT MESSAGE (OUTPATIENT)
Dept: FAMILY MEDICINE | Facility: CLINIC | Age: 65
End: 2025-04-01
Payer: MEDICARE

## 2025-04-02 ENCOUNTER — TELEPHONE (OUTPATIENT)
Dept: HEPATOLOGY | Facility: CLINIC | Age: 65
End: 2025-04-02
Payer: MEDICARE

## 2025-04-02 NOTE — TELEPHONE ENCOUNTER
Patient received VM message from [facility] about her Pain status and did she go to the ER for evaluation and treatment?      Patient stated yesterday I was having this pain in my upper torso in my back on the left side.  I had my niece on stand by.    I am feeling much better today.  I think I know what happened.   I pulled this muscle on my upper back after doing some cleaning.    That is why I was calling so see if she (Dr Chakraborty) can order something for me until our Virtual Visit on 4/22/25.  The message from Dr Chakraborty was to go to the ER for the Pain you were having.  They would have been able to evaluate and treat you for the symptoms you were having.    I am feeling better right now. I just think I over did it.  Please keep us updated on how you are doing and if the pain returns go to the ER.    Patient verbalized understanding.  Dr Chakraborty in clinic and notified.

## 2025-04-02 NOTE — TELEPHONE ENCOUNTER
Call placed to the patient at [phone]. No Answer.  Left VM message this is Odalis calling from the Office of Dr Chakraborty, [facility].  Calling to see how you are doing with the Pain and did you go to the ER?   Please call us back at [phone].

## 2025-04-05 ENCOUNTER — PATIENT MESSAGE (OUTPATIENT)
Dept: BEHAVIORAL HEALTH | Facility: CLINIC | Age: 65
End: 2025-04-05
Payer: MEDICARE

## 2025-04-05 ENCOUNTER — PATIENT MESSAGE (OUTPATIENT)
Dept: FAMILY MEDICINE | Facility: CLINIC | Age: 65
End: 2025-04-05
Payer: MEDICARE

## 2025-04-07 ENCOUNTER — OFFICE VISIT (OUTPATIENT)
Dept: FAMILY MEDICINE | Facility: CLINIC | Age: 65
End: 2025-04-07
Payer: MEDICARE

## 2025-04-07 ENCOUNTER — RESULTS FOLLOW-UP (OUTPATIENT)
Dept: FAMILY MEDICINE | Facility: CLINIC | Age: 65
End: 2025-04-07

## 2025-04-07 VITALS
SYSTOLIC BLOOD PRESSURE: 120 MMHG | WEIGHT: 171.38 LBS | BODY MASS INDEX: 34.55 KG/M2 | OXYGEN SATURATION: 98 % | HEART RATE: 63 BPM | DIASTOLIC BLOOD PRESSURE: 70 MMHG | TEMPERATURE: 99 F | HEIGHT: 59 IN

## 2025-04-07 DIAGNOSIS — M25.571 ACUTE RIGHT ANKLE PAIN: Primary | ICD-10-CM

## 2025-04-07 DIAGNOSIS — M79.671 RIGHT FOOT PAIN: ICD-10-CM

## 2025-04-07 PROCEDURE — 1100F PTFALLS ASSESS-DOCD GE2>/YR: CPT | Mod: ,,, | Performed by: NURSE PRACTITIONER

## 2025-04-07 PROCEDURE — 3078F DIAST BP <80 MM HG: CPT | Mod: ,,, | Performed by: NURSE PRACTITIONER

## 2025-04-07 PROCEDURE — G2211 COMPLEX E/M VISIT ADD ON: HCPCS | Mod: ,,, | Performed by: NURSE PRACTITIONER

## 2025-04-07 PROCEDURE — 1125F AMNT PAIN NOTED PAIN PRSNT: CPT | Mod: ,,, | Performed by: NURSE PRACTITIONER

## 2025-04-07 PROCEDURE — 3288F FALL RISK ASSESSMENT DOCD: CPT | Mod: ,,, | Performed by: NURSE PRACTITIONER

## 2025-04-07 PROCEDURE — 1160F RVW MEDS BY RX/DR IN RCRD: CPT | Mod: ,,, | Performed by: NURSE PRACTITIONER

## 2025-04-07 PROCEDURE — 3044F HG A1C LEVEL LT 7.0%: CPT | Mod: ,,, | Performed by: NURSE PRACTITIONER

## 2025-04-07 PROCEDURE — 99213 OFFICE O/P EST LOW 20 MIN: CPT | Mod: ,,, | Performed by: NURSE PRACTITIONER

## 2025-04-07 PROCEDURE — 3008F BODY MASS INDEX DOCD: CPT | Mod: ,,, | Performed by: NURSE PRACTITIONER

## 2025-04-07 PROCEDURE — 1159F MED LIST DOCD IN RCRD: CPT | Mod: ,,, | Performed by: NURSE PRACTITIONER

## 2025-04-07 PROCEDURE — 3074F SYST BP LT 130 MM HG: CPT | Mod: ,,, | Performed by: NURSE PRACTITIONER

## 2025-04-07 PROCEDURE — 1157F ADVNC CARE PLAN IN RCRD: CPT | Mod: ,,, | Performed by: NURSE PRACTITIONER

## 2025-04-07 NOTE — PROGRESS NOTES
Results are within acceptable ranges for age and conditions. No change in treatment needed at this time. Follow up as scheduled.

## 2025-04-07 NOTE — PROGRESS NOTES
Patient ID: [id number]     Chief Complaint: right foot swollen and hurts       Subjective     Sophy Iyer is a 64 y.o. female in the office for right foot swollen and hurts       History of Present Illness    CHIEF COMPLAINT:  Patient presents today for right foot and ankle pain after twisting foot 2-3 weeks ago.    HISTORY OF PRESENT ILLNESS:  She twisted her right foot in a grass-covered hole in her yard 2-3 weeks ago with no initial swelling. Current symptoms began 2-3 days ago with worsening pain. She reports burning pain that varies in location and travels around the foot and ankle. Pain is exacerbated by weight-bearing activities but is also present at rest. She notes swelling in the affected area, which was particularly pronounced last night.    MEDICATIONS:  She is currently using Voltaren (diclofenac) cream for pain relief with some benefit, and occasionally takes Aleve. She is attempting to minimize medication use overall.      ROS:  ROS as indicated in HPI.       Answers submitted by the patient for this visit:  Review of Systems Questionnaire (Submitted on 4/6/2025)  activity change: Yes  neck pain: Yes  hearing loss: No  trouble swallowing: No  eye discharge: No  chest tightness: Yes  wheezing: No  chest pain: No  palpitations: No  constipation: No  vomiting: No  diarrhea: No  difficulty urinating: No  hematuria: No  joint swelling: Yes  arthralgias: Yes  headaches: Yes  dysphoric mood: No  Past Medical History:  has a past medical history of Anemia, Anxiety, Bipolar disorder, Calculus of kidney, Chronic sinusitis, unspecified, Cirrhosis, Depression, Fatigue, GERD (gastroesophageal reflux disease), Grief, Intra-abdominal varices, Kidney stone, Left shoulder pain, Migraine, Mixed hyperlipidemia, Obesity, unspecified, Restless legs syndrome (RLS), Trapezius muscle spasm, and Vitamin D deficiency.    Social History:  reports that she has never smoked. She has never been exposed to tobacco smoke. She has  "never used smokeless tobacco. She reports that she does not drink alcohol and does not use drugs.    Current Outpatient Medications   Medication Instructions    atorvastatin (LIPITOR) 40 mg, Oral, Nightly    carBAMazepine (TEGRETOL) 200 mg, Oral, 2 times daily    ergocalciferol (ERGOCALCIFEROL) 50,000 Units, Oral, Every 7 days    GEMTESA 75 mg, Oral, Daily    ibuprofen (ADVIL,MOTRIN) 200 mg, Every 12 hours PRN    ketoconazole (NIZORAL) 2 % cream Topical (Top), Daily    MAGNESIUM GLYCINATE ORAL 1 tablet, Daily    meclizine (ANTIVERT) 12.5 mg, 3 times daily PRN    multivitamin with minerals tablet 1 tablet, Daily    pantoprazole (PROTONIX) 40 mg, Oral    paroxetine (PAXIL) 60 mg, Oral, Daily    rizatriptan (MAXALT) 10 mg, Oral, Once as needed    rOPINIRole (REQUIP) 1 mg, Oral, 2 times daily    traZODone (DESYREL) 150 MG tablet TAKE 1 TABLET AT BEDTIME AS NEEDED FOR INSOMNIA    TURMERIC ORAL 1 tablet, Daily       Patient is allergic to prednisone.     Patient Care Team:  Brittany Hernandez FNP-C as PCP - General (Family Medicine)  Matthew Ferreira PMHNP as Nurse Practitioner (Psychiatry)  Han Ramírez MD as Consulting Physician (Hematology and Oncology)  Justin Beltrán MD (Dermatology)  Robetr Gonzalez MD as Consulting Physician (Urology)  Jason Pascual MD as Consulting Physician (Cardiology)  Coty Chakraborty MD as Consulting Physician (Transplant)     Objective     Visit Vitals  /70 (BP Location: Left arm, Patient Position: Sitting)   Pulse 63   Temp 98.6 °F (37 °C) (Temporal)   Ht 4' 11.02" (1.499 m)   Wt 77.7 kg (171 lb 6.4 oz)   SpO2 98%   BMI 34.60 kg/m²       Physical Exam  Vitals reviewed.   Constitutional:       General: She is not in acute distress.  Pulmonary:      Effort: Pulmonary effort is normal.   Musculoskeletal:      Right foot: Normal range of motion. Tenderness (laterally, midfoot) present. No swelling.      Comments: Bluish discoloration to lateral foot, area of " tenderness. Appears to be vascular rupture, approximately 2 cm.    Feet:      Right foot:      Skin integrity: Skin integrity normal.   Skin:     General: Skin is warm and dry.   Neurological:      Mental Status: She is alert. Mental status is at baseline.   Psychiatric:         Mood and Affect: Mood normal.       Assessment & Plan     1. Acute right ankle pain  -     X-Ray Ankle Complete Right; Future; Expected date: 04/07/2025    2. Right foot pain  Comments:  encouraged ice, compression, elevation.  use diclofenac gel TID.  RTC if no improvment after 2 weeks - may need MRI.  Orders:  -     X-Ray Foot Complete Right; Future; Expected date: 04/07/2025         Assessment & Plan    M25.571 Acute right ankle pain  M79.671 Right foot pain    IMPRESSION:  - Assessed foot injury, noting swelling and pain that has worsened over the past few days.  - Observed possible bruising and a potential busted blood vessel.    M25.571 ACUTE RIGHT ANKLE PAIN:  - Continued Voltaren (diclofenac) cream, 3-4 times daily as needed for pain relief.  - Ordered XR Foot to rule out fracture and assess the extent of the injury.  - Follow up after XRs for further evaluation and treatment planning.    M79.671 RIGHT FOOT PAIN:  - Continued Voltaren (diclofenac) cream, 3-4 times daily as needed for pain relief.  - Ordered XR Foot to rule out fracture and assess the extent of the injury.  - Follow up after XRs for further evaluation and treatment planning.          No follow-ups on file. In addition to their next scheduled appointment, the patient has also been instructed to follow up on as needed basis.     Future Appointments   Date Time Provider Department Center   4/22/2025 10:30 AM Coty Chakraborty MD [facility]   5/16/2025  2:20 PM Matthew Ferreira, PMHNP [facility]        This note was generated with the assistance of ambient listening technology. Verbal consent was obtained by the patient and accompanying  visitor(s) for the recording of patient appointment to facilitate this note. I attest to having reviewed and edited the generated note for accuracy, though some syntax or spelling errors may persist. Please contact the author of this note for any clarification.

## 2025-04-22 ENCOUNTER — OFFICE VISIT (OUTPATIENT)
Dept: HEPATOLOGY | Facility: CLINIC | Age: 65
End: 2025-04-22
Payer: MEDICARE

## 2025-04-22 ENCOUNTER — PATIENT MESSAGE (OUTPATIENT)
Dept: FAMILY MEDICINE | Facility: CLINIC | Age: 65
End: 2025-04-22
Payer: MEDICARE

## 2025-04-22 ENCOUNTER — PATIENT MESSAGE (OUTPATIENT)
Dept: TRANSPLANT | Facility: CLINIC | Age: 65
End: 2025-04-22
Payer: MEDICARE

## 2025-04-22 DIAGNOSIS — K74.60 HEPATIC CIRRHOSIS, UNSPECIFIED HEPATIC CIRRHOSIS TYPE, UNSPECIFIED WHETHER ASCITES PRESENT: Primary | ICD-10-CM

## 2025-04-22 PROCEDURE — 1159F MED LIST DOCD IN RCRD: CPT | Mod: CPTII,95,, | Performed by: INTERNAL MEDICINE

## 2025-04-22 PROCEDURE — 1157F ADVNC CARE PLAN IN RCRD: CPT | Mod: CPTII,95,, | Performed by: INTERNAL MEDICINE

## 2025-04-22 PROCEDURE — 3044F HG A1C LEVEL LT 7.0%: CPT | Mod: CPTII,95,, | Performed by: INTERNAL MEDICINE

## 2025-04-22 PROCEDURE — 1160F RVW MEDS BY RX/DR IN RCRD: CPT | Mod: CPTII,95,, | Performed by: INTERNAL MEDICINE

## 2025-04-22 PROCEDURE — 98006 SYNCH AUDIO-VIDEO EST MOD 30: CPT | Mod: 95,,, | Performed by: INTERNAL MEDICINE

## 2025-04-22 NOTE — PROGRESS NOTES
"The patient location is: in [place]  The chief complaint leading to consultation is: f/u cirrhosis    Visit type: audiovisual    Face to Face time with patient: 15 minutes  30 minutes of total time spent on the encounter, which includes face to face time and non-face to face time preparing to see the patient (eg, review of tests), Obtaining and/or reviewing separately obtained history, Documenting clinical information in the electronic or other health record, Independently interpreting results (not separately reported) and communicating results to the patient/family/caregiver, or Care coordination (not separately reported).     Each patient to whom he or she provides medical services by telemedicine is:  (1) informed of the relationship between the physician and patient and the respective role of any other health care provider with respect to management of the patient; and (2) notified that he or she may decline to receive medical services by telemedicine and may withdraw from such care at any time.    Notes: HEPATOLOGY FOLLOW UP    Referring Physician: Brittany Hernandez FNP-C   Current Corresponding Physician: Brittany Hernandez FNP-C     Sophy Iyer is here for follow up of cirrhosis    HPI  Sophy Iyer is a 64 y.o. female who presented 7/19/24for evaluation of cirrhosis noted on abdominal imaging     --saw local GI in [place] 6/24 after ER visit for RUQ pain and nausea and BRBPR but no admission to the hospital and no blood transfusion  --suspected esophageal varices (intra-abdominal varices seen on ct scan)-has not had EGD yet  --sent to hematologist for low "counts" including platelets-no bone marrow done  --unintentional weight loss of 40 lbs since 01/24  --no ascites or edema  --+++memory problems: lactulose prescribed (has not yet gotten it); of note ammonia level was checked and was only 17  --prgiven golytely instead of lactulose initially- +++loose stools- had to take imodium     --~6 hosp for bipolar " "during her life: including 3 suicide attempts (last hosp) 2006; is on disabiity     Labs 5/20/24: ALT 29, AST 40, ALKP 79, Tbil 0.5. plts  Alcohol: no significant alcohol  BMI: 7/15/24 35 (213 lbs one year ago)-losing weight due to loss of appetite     CT abdo pelvis w IV contrast 5/20/24: cirrhosis; splenomegaly; no HCC; no cause for pain found  CT renal stone study 5/4/24: Gallbladder is surgically absent. The spleen is enlarged with innumerable periesophageal and perigastric varices      MELD 3.0: 8 at 5/20/2024  8:19 AM  MELD-Na: 7 at 5/20/2024  8:19 AM  Calculated from:  Serum Creatinine: 0.72 mg/dL (Using min of 1 mg/dL) at 5/20/2024  8:19 AM  Serum Sodium: 140 mmol/L (Using max of 137 mmol/L) at 5/20/2024  8:19 AM  Total Bilirubin: 0.5 mg/dL (Using min of 1 mg/dL) at 5/20/2024  8:19 AM  Serum Albumin: 3.9 g/dL (Using max of 3.5 g/dL) at 5/20/2024  8:19 AM  INR(ratio): 1.1 at 5/20/2024  8:19 AM  Age at listing (hypothetical): 63 years  Sex: Female at 5/20/2024  8:19 AM    Interval History  IMPRESSION at time of consultation:  63 y.o. female with cirrhosis, likely due to MASLD given elevated BMI and AST>ALT. Current recommendations:  Cirrhosis, MELD <15. I am recommending: I do a full serologic w/u for chronic liver disease; HCC screening every 6 months with imaging and an AFP (MRI now) and then abdo US in 6 months; <2000 mg salt per day  Memory problems: trial of lactulose reasonable however, ammonia not elevated-will recheck; if it does not help then would recommend MRI brain and neurology evaluation  Portal Htn. GI bleeding hx and varices on imaging: EGD as scheduled  Unintentional weight loss: MRI as above and ct chest; up to date with mammogram; get pap done; colonoscopy as scheduled; check CA 19-9, , AFP, CEA    Since Sophy Iyer's initial consultation 7/24 (did not follow up until now):  --had a "nervous breakdown"  --EGD not done  --colonoscopy not done  --still unable to come to [place] due to " transportation issues    Labs 3/20/25: ALT 18, AST 33, ALKP 74, Tbil 0.6, plts 87    MRI abdo w wo contrast 3/27/25: Changes are present compatible with patient's history of cirrhosis with what appear to be extensive venous varicosities within the region of the distal 3rd of the esophagus     MELD 3.0: 9 at 3/20/2025 10:45 AM  MELD-Na: 8 at 3/20/2025 10:45 AM  Calculated from:  Serum Creatinine: 0.66 mg/dL (Using min of 1 mg/dL) at 3/20/2025 10:45 AM  Serum Sodium: 139 mmol/L (Using max of 137 mmol/L) at 3/20/2025 10:45 AM  Total Bilirubin: 0.6 mg/dL (Using min of 1 mg/dL) at 3/20/2025 10:45 AM  Serum Albumin: 4.3 g/dL (Using max of 3.5 g/dL) at 3/20/2025 10:45 AM  INR(ratio): 1.2 at 3/20/2025 10:45 AM  Age at listing (hypothetical): 64 years  Sex: Female at 3/20/2025 10:45 AM       Encounter Medications[1]  Review of patient's allergies indicates:   Allergen Reactions    Prednisone Anxiety     Past Medical History:   Diagnosis Date    Anemia     Anxiety     Bipolar disorder     Calculus of kidney 02/22/2023    Chronic sinusitis, unspecified     Cirrhosis 07/19/2024    Depression     Fatigue     GERD (gastroesophageal reflux disease)     Grief     Intra-abdominal varices 06/03/2024    Kidney stone     Left shoulder pain     Migraine     Mixed hyperlipidemia     Obesity, unspecified     Restless legs syndrome (RLS)     Trapezius muscle spasm     Vitamin D deficiency        Review of Systems   Constitutional: Negative.    HENT: Negative.     Eyes: Negative.    Respiratory: Negative.     Cardiovascular: Negative.    Gastrointestinal: Negative.    Genitourinary: Negative.    Musculoskeletal: Negative.    Skin: Negative.    Neurological: Negative.    Psychiatric/Behavioral: Negative.       There were no vitals filed for this visit.    Physical Exam    MELD 3.0: 9 at 3/20/2025 10:45 AM  MELD-Na: 8 at 3/20/2025 10:45 AM  Calculated from:  Serum Creatinine: 0.66 mg/dL (Using min of 1 mg/dL) at 3/20/2025 10:45 AM  Serum  Sodium: 139 mmol/L (Using max of 137 mmol/L) at 3/20/2025 10:45 AM  Total Bilirubin: 0.6 mg/dL (Using min of 1 mg/dL) at 3/20/2025 10:45 AM  Serum Albumin: 4.3 g/dL (Using max of 3.5 g/dL) at 3/20/2025 10:45 AM  INR(ratio): 1.2 at 3/20/2025 10:45 AM  Age at listing (hypothetical): 64 years  Sex: Female at 3/20/2025 10:45 AM      Lab Results   Component Value Date    BUN 16 03/20/2025    CREATININE 0.66 03/20/2025    CALCIUM 9.2 03/20/2025     03/20/2025    K 3.8 03/20/2025     03/20/2025    CO2 28 03/20/2025    WBC 2.45 (L) 03/20/2025    WBC 3.1 (L) 02/19/2024    RBC 3.81 (L) 03/20/2025    HGB 11.4 (L) 03/20/2025    HGB 12.3 02/19/2024    HCT 35.6 (L) 03/20/2025    HCT 38.3 02/19/2024    MCV 93.4 03/20/2025    MCV 94.3 02/19/2024    MCH 29.9 03/20/2025    MCHC 32.0 03/20/2025     Lab Results   Component Value Date    RDW 14.6 (H) 03/20/2025    PLT 87 (L) 03/20/2025    MPV 10.0 03/20/2025    APTT 24.8 (L) 11/25/2018    CHOL 200 03/13/2025    TRIG 42 03/13/2025     (H) 03/13/2025    LDL 73.8 08/19/2021    ALBUMIN 4.3 03/20/2025    BILIDIR 0.3 03/20/2025    AST 33 03/20/2025    ALT 18 03/20/2025    ALKPHOS 74 03/20/2025    MG 2.00 01/18/2024    LABPROT 6.6 03/20/2025    LABPROT 11.6 03/20/2025    INR 1.2 03/20/2025       Assessment and Plan:  Problem List[2]  Sophy Iyer is a 64 y.o. female with cirrhosis  Current recommendations:  Cirrhosis, compensated: recommend meld labs every 3 months; HCC screening every 6 months with imaging and AFP; check full serologic w/u for various causes of liver disease  Portal htn: EGD to r/o varices  Colorectal ca screening: colonoscopy   Tegretol/seizrues: Tegretol not causing any hepatic issues -enzymes normal  Pruritus: check bile acids  Resless legs: f/u pcp  F/u with DR Ramos in person in  in 6 months           [1]   Outpatient Encounter Medications as of 4/22/2025   Medication Sig Dispense Refill    atorvastatin (LIPITOR) 40 MG tablet Take 1 tablet (40 mg  total) by mouth every evening. 90 tablet 3    carBAMazepine (TEGRETOL) 200 mg tablet Take 1 tablet (200 mg total) by mouth 2 (two) times daily. 60 tablet 3    ergocalciferol (ERGOCALCIFEROL) 50,000 unit Cap Take 1 capsule (50,000 Units total) by mouth every 7 days. 12 capsule 3    ibuprofen (ADVIL,MOTRIN) 200 MG tablet Take 200 mg by mouth every 12 (twelve) hours as needed for Pain.      ketoconazole (NIZORAL) 2 % cream Apply topically once daily. 60 g 3    MAGNESIUM GLYCINATE ORAL Take 1 tablet by mouth Daily.      meclizine (ANTIVERT) 25 mg tablet Take 12.5 mg by mouth 3 (three) times daily as needed for Dizziness.      multivitamin with minerals tablet Take 1 tablet by mouth once daily.      pantoprazole (PROTONIX) 40 MG tablet TAKE 1 TABLET BY MOUTH ONCE DAILY 30 tablet 11    paroxetine (PAXIL) 30 MG tablet Take 2 tablets (60 mg total) by mouth once daily. 60 tablet 3    rizatriptan (MAXALT) 10 MG tablet Take 1 tablet (10 mg total) by mouth once as needed for Migraine. 16 tablet 5    rOPINIRole (REQUIP) 1 MG tablet Take 1 tablet (1 mg total) by mouth 2 (two) times a day. 60 tablet 2    traZODone (DESYREL) 150 MG tablet TAKE 1 TABLET AT BEDTIME AS NEEDED FOR INSOMNIA 90 tablet 1    TURMERIC ORAL Take 1 tablet by mouth Daily.      vibegron (GEMTESA) 75 mg Tab Take 1 tablet (75 mg total) by mouth once daily. 90 tablet 0    [DISCONTINUED] diazePAM (VALIUM) 5 MG tablet Take one tablet by mouth 30 minutes prior to testing.  May repeat dose once if needed. 2 tablet 0    [DISCONTINUED] fluticasone propionate (FLONASE) 50 mcg/actuation nasal spray 1 spray (50 mcg total) by Each Nostril route once daily. 16 mL 0     No facility-administered encounter medications on file as of 4/22/2025.   [2]   Patient Active Problem List  Diagnosis    Other neutropenia    Bipolar affective disorder, remission status unspecified    Generalized anxiety disorder    Gastroesophageal reflux disease    Hyperlipidemia    Migraine headache     Sinusitis    Vitamin D deficiency    Obesity    Restless legs syndrome    Shortness of breath    Thrombocytopenia, unspecified    Asthma    Elevated blood pressure reading    Near syncope    Fatigue    Long term use of drug    Dizziness    Anxious mood    Intra-abdominal varices    Splenomegaly    Esophageal varices in cirrhosis    Bladder spasms    Atypical chest pain    Encounter for routine adult health examination with abnormal findings    Right ankle pain    Right foot pain

## 2025-04-22 NOTE — PATIENT INSTRUCTIONS
MELD Labs every 3 months  EGD/colonoscopy  Serologic w/u for CLD  Tegretol seems to be ok  Will check bile acids for itchiness  See pcp re restless legs  F/u with Dr Ramos in [place] since pt can't travel to [facility]

## 2025-04-24 ENCOUNTER — PATIENT MESSAGE (OUTPATIENT)
Dept: FAMILY MEDICINE | Facility: CLINIC | Age: 65
End: 2025-04-24
Payer: MEDICARE

## 2025-04-24 DIAGNOSIS — G25.81 RESTLESS LEGS SYNDROME: ICD-10-CM

## 2025-04-24 RX ORDER — ROPINIROLE 2 MG/1
2 TABLET, FILM COATED ORAL 2 TIMES DAILY
Qty: 180 TABLET | Refills: 0 | Status: SHIPPED | OUTPATIENT
Start: 2025-04-24 | End: 2025-07-23

## 2025-04-28 ENCOUNTER — PATIENT MESSAGE (OUTPATIENT)
Dept: HEPATOLOGY | Facility: CLINIC | Age: 65
End: 2025-04-28
Payer: MEDICARE

## 2025-04-28 ENCOUNTER — TELEPHONE (OUTPATIENT)
Dept: HEPATOLOGY | Facility: CLINIC | Age: 65
End: 2025-04-28
Payer: MEDICARE

## 2025-04-28 NOTE — TELEPHONE ENCOUNTER
Pt scheduled for labs in 3 mths. Pt scheduled herself for us to be done in October. Pt was placed on [facility] hepatology recalls so they will contact her when is time to scheduled. When the scheduled for Dr Ramos opens.

## 2025-04-28 NOTE — TELEPHONE ENCOUNTER
----- Message from Coty Chakraborty MD sent at 4/27/2025  9:39 PM CDT -----  Labs every 3 months; maryann US w afp in 6 months  F/u with dr love in [place] in 6 months

## 2025-04-29 ENCOUNTER — HOSPITAL ENCOUNTER (OUTPATIENT)
Dept: PREADMISSION TESTING | Facility: HOSPITAL | Age: 65
Discharge: HOME | End: 2025-04-29
Attending: SURGERY
Payer: MEDICARE

## 2025-04-29 VITALS
BODY MASS INDEX: 34.27 KG/M2 | BODY MASS INDEX: 34.27 KG/M2 | WEIGHT: 170 LBS | HEIGHT: 59 IN | HEIGHT: 59 IN | WEIGHT: 170 LBS

## 2025-04-29 DIAGNOSIS — K92.1 BLOOD IN STOOL: Primary | ICD-10-CM

## 2025-04-29 RX ORDER — DIPHENHYDRAMINE HCL 25 MG
25 CAPSULE ORAL NIGHTLY
COMMUNITY

## 2025-04-29 RX ORDER — SODIUM CHLORIDE, SODIUM LACTATE, POTASSIUM CHLORIDE, CALCIUM CHLORIDE 600; 310; 30; 20 MG/100ML; MG/100ML; MG/100ML; MG/100ML
INJECTION, SOLUTION INTRAVENOUS CONTINUOUS
OUTPATIENT
Start: 2025-05-07

## 2025-04-29 RX ORDER — GLYCOPYRROLATE 0.2 MG/ML
0.2 INJECTION INTRAMUSCULAR; INTRAVENOUS ONCE
OUTPATIENT
Start: 2025-05-02

## 2025-04-29 NOTE — DISCHARGE INSTRUCTIONS

## 2025-04-29 NOTE — DISCHARGE INSTRUCTIONS

## 2025-05-05 ENCOUNTER — ANESTHESIA EVENT (OUTPATIENT)
Dept: GASTROENTEROLOGY | Facility: HOSPITAL | Age: 65
End: 2025-05-05
Payer: MEDICARE

## 2025-05-05 NOTE — ANESTHESIA PREPROCEDURE EVALUATION
05/05/2025  Sophy Iyer is a 64 y.o., female presents for colonoscopy      Anesthesia History      No specialty history recorded    Other Medical History   Depression Anemia   Mixed hyperlipidemia Bipolar disorder   Anxiety Fatigue   GERD (gastroesophageal reflux disease) Grief   Kidney stone Left shoulder pain   Migraine Obesity, unspecified   Restless legs syndrome (RLS) Chronic sinusitis, unspecified   Trapezius muscle spasm Vitamin D deficiency   Calculus of kidney Cirrhosis   Intra-abdominal varices        Surgical History    CHOLECYSTECTOMY HYSTERECTOMY   COLON BIOPSY CARPAL TUNNEL RELEASE   COLON SURGERY SMALL INTESTINE SURGERY         Prescription Medications  Within last 14 days from 05/05/25   Last Taken Last Updated   atorvastatin (LIPITOR) 40 MG tablet        carBAMazepine (TEGRETOL) 200 mg tablet        diphenhydrAMINE (BENADRYL) 25 mg capsule        ergocalciferol (ERGOCALCIFEROL) 50,000 unit Cap        ibuprofen (ADVIL,MOTRIN) 200 MG tablet        ketoconazole (NIZORAL) 2 % cream    Taking 09/13/24 1124   MAGNESIUM GLYCINATE ORAL        meclizine (ANTIVERT) 25 mg tablet        multivitamin with minerals tablet        pantoprazole (PROTONIX) 40 MG tablet        paroxetine (PAXIL) 30 MG tablet        rOPINIRole (REQUIP) 2 MG tablet        traZODone (DESYREL) 150 MG tablet        TURMERIC ORAL        vibegron (GEMTESA) 75 mg Tab                  Pre-op Assessment    I have reviewed the Patient Summary Reports.     I have reviewed the Nursing Notes. I have reviewed the NPO Status.   I have reviewed the Medications.     Review of Systems  Anesthesia Hx:  No problems with previous Anesthesia             Denies Family Hx of Anesthesia complications.    Denies Personal Hx of Anesthesia complications.                    Social:  Non-Smoker       Hematology/Oncology:    Oncology Normal    -- Anemia:                                   EENT/Dental:  EENT/Dental Normal           Cardiovascular:                hyperlipidemia            Shortness of Breath                          Pulmonary:    Asthma  Shortness of breath     Asthma:               Renal/:  Chronic Renal Disease        Kidney Function/Disease             Hepatic/GI:     GERD Liver Disease,        Gerd       Liver Disease        Musculoskeletal:  Musculoskeletal Normal                Neurological:    Neuromuscular Disease,  Headaches      Dx of Headaches                         Neuromuscular Disease   Endocrine:        Obesity / BMI > 30  Dermatological:  Skin Normal    Psych:  Psychiatric History anxiety depression       Psychotic Disorder and Bipolar disorder.          Physical Exam  General: Well nourished, Cooperative, Alert and Oriented    Airway:  Mouth Opening: Normal  TM Distance: Normal  Tongue: Normal  Neck ROM: Normal ROM        Anesthesia Plan  Type of Anesthesia, risks & benefits discussed:    Anesthesia Type: MAC  Intra-op Monitoring Plan: Standard ASA Monitors  Post Op Pain Control Plan:   (medical reason for not using multimodal pain management)  Induction:  IV  Informed Consent: Informed consent signed with the Patient and all parties understand the risks and agree with anesthesia plan.  All questions answered.   ASA Score: 3  Day of Surgery Review of History & Physical: H&P Update referred to the surgeon/provider.I have interviewed and examined the patient. I have reviewed the patient's H&P dated: There are no significant changes.     Ready For Surgery From Anesthesia Perspective.     .

## 2025-05-07 ENCOUNTER — HOSPITAL ENCOUNTER (OUTPATIENT)
Dept: GASTROENTEROLOGY | Facility: HOSPITAL | Age: 65
Discharge: HOME | End: 2025-05-07
Attending: SURGERY
Payer: MEDICARE

## 2025-05-07 ENCOUNTER — ANESTHESIA (OUTPATIENT)
Dept: GASTROENTEROLOGY | Facility: HOSPITAL | Age: 65
End: 2025-05-07
Payer: MEDICARE

## 2025-05-07 VITALS
RESPIRATION RATE: 20 BRPM | HEIGHT: 59 IN | SYSTOLIC BLOOD PRESSURE: 135 MMHG | HEART RATE: 55 BPM | WEIGHT: 170 LBS | DIASTOLIC BLOOD PRESSURE: 54 MMHG | BODY MASS INDEX: 34.27 KG/M2 | TEMPERATURE: 98 F | OXYGEN SATURATION: 99 %

## 2025-05-07 DIAGNOSIS — K92.1 BLOODY STOOL: ICD-10-CM

## 2025-05-07 DIAGNOSIS — K92.1 BLOOD IN STOOL: ICD-10-CM

## 2025-05-07 PROCEDURE — 63600175 PHARM REV CODE 636 W HCPCS: Performed by: NURSE ANESTHETIST, CERTIFIED REGISTERED

## 2025-05-07 PROCEDURE — 37000008 HC ANESTHESIA 1ST 15 MINUTES

## 2025-05-07 PROCEDURE — 25000003 PHARM REV CODE 250: Performed by: SURGERY

## 2025-05-07 PROCEDURE — 37000009 HC ANESTHESIA EA ADD 15 MINS

## 2025-05-07 PROCEDURE — 63600175 PHARM REV CODE 636 W HCPCS: Performed by: SURGERY

## 2025-05-07 RX ORDER — PROPOFOL 10 MG/ML
VIAL (ML) INTRAVENOUS
Status: DISCONTINUED | OUTPATIENT
Start: 2025-05-07 | End: 2025-05-07

## 2025-05-07 RX ORDER — LIDOCAINE HYDROCHLORIDE 20 MG/ML
INJECTION INTRAVENOUS
Status: DISCONTINUED | OUTPATIENT
Start: 2025-05-07 | End: 2025-05-07

## 2025-05-07 RX ORDER — ONDANSETRON 4 MG/1
8 TABLET, ORALLY DISINTEGRATING ORAL ONCE
Status: COMPLETED | OUTPATIENT
Start: 2025-05-07 | End: 2025-05-07

## 2025-05-07 RX ORDER — SODIUM CHLORIDE, SODIUM LACTATE, POTASSIUM CHLORIDE, CALCIUM CHLORIDE 600; 310; 30; 20 MG/100ML; MG/100ML; MG/100ML; MG/100ML
INJECTION, SOLUTION INTRAVENOUS CONTINUOUS
Status: DISCONTINUED | OUTPATIENT
Start: 2025-05-07 | End: 2025-05-08 | Stop reason: HOSPADM

## 2025-05-07 RX ADMIN — LIDOCAINE HYDROCHLORIDE 100 MG: 20 INJECTION, SOLUTION INTRAVENOUS at 08:05

## 2025-05-07 RX ADMIN — PROPOFOL 110 MG: 10 INJECTION, EMULSION INTRAVENOUS at 08:05

## 2025-05-07 RX ADMIN — SODIUM CHLORIDE, POTASSIUM CHLORIDE, SODIUM LACTATE AND CALCIUM CHLORIDE: 600; 310; 30; 20 INJECTION, SOLUTION INTRAVENOUS at 07:05

## 2025-05-07 RX ADMIN — ONDANSETRON 8 MG: 4 TABLET, ORALLY DISINTEGRATING ORAL at 10:05

## 2025-05-07 NOTE — DISCHARGE INSTRUCTIONS
Follow-up with Dr Davis  Diet: as tolerated  Activity:  decrease activity today, no driving today, resume all activity tomorrow  Notify MD:  increased swelling of abdomen, excessive nausea/vomiting, excessive bright red bleeding from rectum  Medications:  continue your home medications. Keep a list of your home medications at all times for emergencies.

## 2025-05-07 NOTE — ANESTHESIA POSTPROCEDURE EVALUATION
Anesthesia Post Evaluation    Patient: Sophy Iyer    Procedure(s) Performed: * No procedures listed *    Final Anesthesia Type: MAC      Patient location during evaluation: floor  Patient participation: Yes- Able to Participate  Level of consciousness: awake and alert, awake and oriented  Post-procedure vital signs: reviewed and stable  Pain management: adequate  Airway patency: patent    PONV status at discharge: No PONV  Anesthetic complications: no      Cardiovascular status: blood pressure returned to baseline  Respiratory status: unassisted, room air and spontaneous ventilation  Hydration status: euvolemic  Follow-up not needed.              Vitals Value Taken Time   /64 05/07/25 07:36   Temp 36.4 °C (97.5 °F) 05/07/25 07:36   Pulse 64 05/07/25 07:36   Resp 20 05/07/25 07:36   SpO2 99 % 05/07/25 07:36         No case tracking events are documented in the log.      Pain/Lucia Score: No data recorded

## 2025-05-07 NOTE — PLAN OF CARE
PT IS BACK TO ROOM, AROUSED PER VOICE, IN STABLE CONDITION, NO DIFFICULTY BREATHING OR SWALLOWING, NO RECTAL BLEEDING NOTED, + FLATUS NOTED, PT IS TOLERATING LIQUIDS, FAMILY AT SIDE, SR X2, CB IN REACH. FREQUENT VITALS IN PROGRESS.

## 2025-05-07 NOTE — DISCHARGE SUMMARY
[facility]  [facility] Stay    Colonoscopy      OUTCOME: Patient tolerated treatment/procedure well without complication and is now ready for discharge.    DISPOSITION: Home or Self Care      FINAL DIAGNOSIS:    Terminal ileum up to 20 cm   Normal cecum ascending transverse colon   Status post left colon resection anastomosis at 38 cm   Anastomosis normal side-to-side without any abnormalities  Normal remain rectosigmoid colon  Good tone no masses with grade 1-2 internal hemorrhoids  FOLLOWUP: In clinic 1 week    DISCHARGE INSTRUCTIONS:  No discharge procedures on file.     TIME SPENT ON DISCHARGE:  5 minutes

## 2025-05-07 NOTE — H&P
The patient has been examined and the H&P has been reviewed:    I concur with the findings and no changes have occurred since H&P was written.    Staff present during examination : Franca Oh RN    Patient cleared for Anesthesia: MAC    Anesthesia/Surgery risks, benefits and alternative options discussed and understood by patient/family.    I have reviewed and agree with Anesthesia Plan of Care.    There are no hospital problems to display for this patient.

## 2025-05-07 NOTE — PLAN OF CARE
"Pt reports alleviation of nausea. States "I'm ready to go home"    Discharge instructions provided to patient and family, allowed time for questions, verbalized understanding.    Discharged home in stable condition via wheel chair, accompanied by family, no s/s of distress noted  "

## 2025-05-09 ENCOUNTER — HOSPITAL ENCOUNTER (OUTPATIENT)
Dept: GASTROENTEROLOGY | Facility: HOSPITAL | Age: 65
Discharge: HOME | End: 2025-05-09
Attending: SURGERY
Payer: MEDICARE

## 2025-05-09 ENCOUNTER — HOSPITAL ENCOUNTER (EMERGENCY)
Facility: HOSPITAL | Age: 65
Discharge: HOME | End: 2025-05-09
Payer: MEDICARE

## 2025-05-09 ENCOUNTER — ANESTHESIA EVENT (OUTPATIENT)
Dept: GASTROENTEROLOGY | Facility: HOSPITAL | Age: 65
End: 2025-05-09
Payer: MEDICARE

## 2025-05-09 ENCOUNTER — ANESTHESIA (OUTPATIENT)
Dept: GASTROENTEROLOGY | Facility: HOSPITAL | Age: 65
End: 2025-05-09
Payer: MEDICARE

## 2025-05-09 VITALS
HEIGHT: 59 IN | RESPIRATION RATE: 17 BRPM | OXYGEN SATURATION: 98 % | TEMPERATURE: 99 F | DIASTOLIC BLOOD PRESSURE: 90 MMHG | SYSTOLIC BLOOD PRESSURE: 167 MMHG | WEIGHT: 169 LBS | HEART RATE: 60 BPM | BODY MASS INDEX: 34.07 KG/M2

## 2025-05-09 VITALS
OXYGEN SATURATION: 100 % | HEIGHT: 59 IN | RESPIRATION RATE: 20 BRPM | DIASTOLIC BLOOD PRESSURE: 73 MMHG | SYSTOLIC BLOOD PRESSURE: 140 MMHG | WEIGHT: 170 LBS | HEART RATE: 55 BPM | BODY MASS INDEX: 34.27 KG/M2 | TEMPERATURE: 97 F

## 2025-05-09 DIAGNOSIS — K92.1 BLOOD IN STOOL: ICD-10-CM

## 2025-05-09 DIAGNOSIS — K20.90 ESOPHAGITIS: Primary | ICD-10-CM

## 2025-05-09 DIAGNOSIS — R07.9 CHEST PAIN: ICD-10-CM

## 2025-05-09 LAB
ALBUMIN SERPL-MCNC: 4.4 G/DL (ref 3.4–5)
ALBUMIN/GLOB SERPL: 1.5 RATIO
ALP SERPL-CCNC: 71 UNIT/L (ref 50–144)
ALT SERPL-CCNC: 24 UNIT/L (ref 1–45)
ANION GAP SERPL CALC-SCNC: 3 MEQ/L (ref 2–13)
AST SERPL-CCNC: 33 UNIT/L (ref 14–36)
BASOPHILS # BLD AUTO: 0.02 X10(3)/MCL (ref 0.01–0.08)
BASOPHILS NFR BLD AUTO: 0.7 % (ref 0.1–1.2)
BILIRUB SERPL-MCNC: 0.5 MG/DL (ref 0–1)
BUN SERPL-MCNC: 16 MG/DL (ref 7–20)
CALCIUM SERPL-MCNC: 9.5 MG/DL (ref 8.4–10.2)
CHLORIDE SERPL-SCNC: 108 MMOL/L (ref 98–110)
CO2 SERPL-SCNC: 28 MMOL/L (ref 21–32)
CREAT SERPL-MCNC: 0.6 MG/DL (ref 0.66–1.25)
CREAT/UREA NIT SERPL: 27 (ref 12–20)
EOSINOPHIL # BLD AUTO: 0.12 X10(3)/MCL (ref 0.04–0.36)
EOSINOPHIL NFR BLD AUTO: 4.2 % (ref 0.7–7)
ERYTHROCYTE [DISTWIDTH] IN BLOOD BY AUTOMATED COUNT: 14.3 % (ref 11–14.5)
GFR SERPLBLD CREATININE-BSD FMLA CKD-EPI: >90 ML/MIN/1.73/M2
GLOBULIN SER-MCNC: 2.9 GM/DL (ref 2–3.9)
GLUCOSE SERPL-MCNC: 99 MG/DL (ref 70–115)
HCT VFR BLD AUTO: 37.3 % (ref 36–48)
HGB BLD-MCNC: 12.1 G/DL (ref 11.8–16)
IMM GRANULOCYTES # BLD AUTO: 0.01 X10(3)/MCL (ref 0–0.03)
IMM GRANULOCYTES NFR BLD AUTO: 0.4 % (ref 0–0.5)
LIPASE SERPL-CCNC: 161 U/L (ref 23–300)
LYMPHOCYTES # BLD AUTO: 0.95 X10(3)/MCL (ref 1.16–3.74)
LYMPHOCYTES NFR BLD AUTO: 33.6 % (ref 20–55)
MCH RBC QN AUTO: 30.3 PG (ref 27–34)
MCHC RBC AUTO-ENTMCNC: 32.4 G/DL (ref 31–37)
MCV RBC AUTO: 93.3 FL (ref 79–99)
MONOCYTES # BLD AUTO: 0.22 X10(3)/MCL (ref 0.24–0.36)
MONOCYTES NFR BLD AUTO: 7.8 % (ref 4.7–12.5)
NEUTROPHILS # BLD AUTO: 1.51 X10(3)/MCL (ref 1.56–6.13)
NEUTROPHILS NFR BLD AUTO: 53.3 % (ref 37–73)
NRBC BLD AUTO-RTO: 0 %
PLATELET # BLD AUTO: 86 X10(3)/MCL (ref 140–371)
PMV BLD AUTO: 10.2 FL (ref 9.4–12.4)
POTASSIUM SERPL-SCNC: 3.7 MMOL/L (ref 3.5–5.1)
PROT SERPL-MCNC: 7.3 GM/DL (ref 6.3–8.2)
RBC # BLD AUTO: 4 X10(6)/MCL (ref 4–5.1)
SODIUM SERPL-SCNC: 139 MMOL/L (ref 136–145)
TROPONIN I SERPL-MCNC: <0.012 NG/ML (ref 0–0.03)
WBC # BLD AUTO: 2.83 X10(3)/MCL (ref 4–11.5)

## 2025-05-09 PROCEDURE — 25000003 PHARM REV CODE 250: Performed by: NURSE ANESTHETIST, CERTIFIED REGISTERED

## 2025-05-09 PROCEDURE — 25500020 PHARM REV CODE 255

## 2025-05-09 PROCEDURE — 43244 EGD VARICES LIGATION: CPT | Performed by: SURGERY

## 2025-05-09 PROCEDURE — 27201423 OPTIME MED/SURG SUP & DEVICES STERILE SUPPLY

## 2025-05-09 PROCEDURE — 37000009 HC ANESTHESIA EA ADD 15 MINS

## 2025-05-09 PROCEDURE — 84484 ASSAY OF TROPONIN QUANT: CPT | Performed by: FAMILY MEDICINE

## 2025-05-09 PROCEDURE — 93005 ELECTROCARDIOGRAM TRACING: CPT

## 2025-05-09 PROCEDURE — 99285 EMERGENCY DEPT VISIT HI MDM: CPT | Mod: 25

## 2025-05-09 PROCEDURE — 63600175 PHARM REV CODE 636 W HCPCS: Performed by: NURSE ANESTHETIST, CERTIFIED REGISTERED

## 2025-05-09 PROCEDURE — 37000008 HC ANESTHESIA 1ST 15 MINUTES

## 2025-05-09 PROCEDURE — 93010 ELECTROCARDIOGRAM REPORT: CPT | Mod: ,,, | Performed by: INTERNAL MEDICINE

## 2025-05-09 PROCEDURE — 63600175 PHARM REV CODE 636 W HCPCS: Performed by: SURGERY

## 2025-05-09 PROCEDURE — 25000003 PHARM REV CODE 250: Performed by: FAMILY MEDICINE

## 2025-05-09 PROCEDURE — 80053 COMPREHEN METABOLIC PANEL: CPT | Performed by: FAMILY MEDICINE

## 2025-05-09 PROCEDURE — 83690 ASSAY OF LIPASE: CPT | Performed by: FAMILY MEDICINE

## 2025-05-09 PROCEDURE — 85025 COMPLETE CBC W/AUTO DIFF WBC: CPT | Performed by: FAMILY MEDICINE

## 2025-05-09 RX ORDER — SODIUM CHLORIDE, SODIUM LACTATE, POTASSIUM CHLORIDE, CALCIUM CHLORIDE 600; 310; 30; 20 MG/100ML; MG/100ML; MG/100ML; MG/100ML
INJECTION, SOLUTION INTRAVENOUS CONTINUOUS
Status: DISCONTINUED | OUTPATIENT
Start: 2025-05-09 | End: 2025-05-10 | Stop reason: HOSPADM

## 2025-05-09 RX ORDER — GLYCOPYRROLATE 0.2 MG/ML
0.2 INJECTION INTRAMUSCULAR; INTRAVENOUS ONCE
Status: COMPLETED | OUTPATIENT
Start: 2025-05-09 | End: 2025-05-09

## 2025-05-09 RX ORDER — LIDOCAINE HYDROCHLORIDE 20 MG/ML
INJECTION INTRAVENOUS
Status: DISCONTINUED | OUTPATIENT
Start: 2025-05-09 | End: 2025-05-09

## 2025-05-09 RX ORDER — PROPOFOL 10 MG/ML
VIAL (ML) INTRAVENOUS
Status: DISCONTINUED | OUTPATIENT
Start: 2025-05-09 | End: 2025-05-09

## 2025-05-09 RX ORDER — ALUMINUM HYDROXIDE, MAGNESIUM HYDROXIDE, AND SIMETHICONE 1200; 120; 1200 MG/30ML; MG/30ML; MG/30ML
30 SUSPENSION ORAL ONCE
Status: COMPLETED | OUTPATIENT
Start: 2025-05-09 | End: 2025-05-09

## 2025-05-09 RX ORDER — LIDOCAINE HYDROCHLORIDE 20 MG/ML
15 SOLUTION OROPHARYNGEAL ONCE
Status: COMPLETED | OUTPATIENT
Start: 2025-05-09 | End: 2025-05-09

## 2025-05-09 RX ORDER — TRANEXAMIC ACID 100 MG/ML
INJECTION, SOLUTION INTRAVENOUS
Status: DISCONTINUED | OUTPATIENT
Start: 2025-05-09 | End: 2025-05-09

## 2025-05-09 RX ADMIN — PROPOFOL 30 MG: 10 INJECTION, EMULSION INTRAVENOUS at 10:05

## 2025-05-09 RX ADMIN — LIDOCAINE HYDROCHLORIDE 50 MG: 20 INJECTION, SOLUTION INTRAVENOUS at 10:05

## 2025-05-09 RX ADMIN — LIDOCAINE HYDROCHLORIDE 15 ML: 20 SOLUTION ORAL at 04:05

## 2025-05-09 RX ADMIN — PROPOFOL 70 MG: 10 INJECTION, EMULSION INTRAVENOUS at 10:05

## 2025-05-09 RX ADMIN — GLYCOPYRROLATE 0.2 MG: 0.2 INJECTION INTRAMUSCULAR; INTRAVENOUS at 08:05

## 2025-05-09 RX ADMIN — IOHEXOL 100 ML: 350 INJECTION, SOLUTION INTRAVENOUS at 05:05

## 2025-05-09 RX ADMIN — ALUMINUM HYDROXIDE, MAGNESIUM HYDROXIDE, AND DIMETHICONE 30 ML: 200; 20; 200 SUSPENSION ORAL at 04:05

## 2025-05-09 RX ADMIN — SODIUM CHLORIDE, POTASSIUM CHLORIDE, SODIUM LACTATE AND CALCIUM CHLORIDE: 600; 310; 30; 20 INJECTION, SOLUTION INTRAVENOUS at 08:05

## 2025-05-09 RX ADMIN — TRANEXAMIC ACID 1000 MG: 100 INJECTION, SOLUTION INTRAVENOUS at 10:05

## 2025-05-09 NOTE — H&P
The patient has been examined and the H&P has been reviewed:    I concur with the findings and no changes have occurred since H&P was written.    Staff present during examination : Stacy Hilario RN    Patient cleared for Anesthesia: MAC    Anesthesia/Surgery risks, benefits and alternative options discussed and understood by patient/family.    I have reviewed and agree with Anesthesia Plan of Care.    There are no hospital problems to display for this patient.        170.18

## 2025-05-09 NOTE — ED PROVIDER NOTES
"Encounter Date: 2025       History     Chief Complaint   Patient presents with    Throat Pain     PT to ER via [facility], PT C/O throat pain and discomfort after having GI scope done today.      Patient presents with a chief complaint of central chest pain primarily in the lower sternum with minimal extension into the epigastrium.  She states that she has a history of "varicose veins in her esophagus. ".  She had an EGD performed this morning.  She had some nausea without vomiting.  Zofran given in route per EMS.  It is described as a burning-type sensation or sharp.        Review of patient's allergies indicates:   Allergen Reactions    Penicillin Rash    Codeine Rash    Prednisone Anxiety     Past Medical History:   Diagnosis Date    Anemia     Anxiety     Bipolar disorder     Calculus of kidney 2023    Chronic sinusitis, unspecified     Cirrhosis 2024    Depression     Fatigue     GERD (gastroesophageal reflux disease)     Grief     Intra-abdominal varices 2024    Kidney stone     Left shoulder pain     Migraine     Mixed hyperlipidemia     Obesity, unspecified     Restless legs syndrome (RLS)     Trapezius muscle spasm     Vitamin D deficiency      Past Surgical History:   Procedure Laterality Date    CARPAL TUNNEL RELEASE      CHOLECYSTECTOMY      COLON BIOPSY      COLON SURGERY      HYSTERECTOMY      SMALL INTESTINE SURGERY  ?2019    Large tumor     Family History   Problem Relation Name Age of Onset    Hypothyroidism Mother Mell ()     Parkinsonism Mother Mell ()     Miscarriages / Stillbirths Mother Mell ()     Bipolar disorder Father Neptali     Depression Father Neptali     Coronary artery disease Father Neptali     Hypertension Father Neptali     Hypothyroidism Father Neptali     Heart disease Father Neptali     Mental illness Father Neptali     Hypertension Sister Radha     Hypothyroidism Sister Radha     Diabetes Sister Radha     Bradycardia Brother      No " Known Problems Maternal Aunt      No Known Problems Paternal Aunt      No Known Problems Maternal Uncle      No Known Problems Paternal Uncle      No Known Problems Maternal Grandfather      No Known Problems Maternal Grandmother      Diabetes Paternal Grandfather Giselle Gage     Bipolar disorder Paternal Grandmother Digna Suarez     Depression Paternal Grandmother Digna Suarez     Mental illness Paternal Grandmother Digna Suarez     No Known Problems Cousin      No Known Problems Other       Social History[1]  Review of Systems   Constitutional: Negative.    Eyes: Negative.    Respiratory:  Negative for shortness of breath.    Cardiovascular:  Positive for chest pain.   Gastrointestinal:  Positive for abdominal pain and nausea. Negative for vomiting.       Physical Exam     Initial Vitals [05/09/25 1621]   BP Pulse Resp Temp SpO2   (!) 160/62 (!) 59 18 98.7 °F (37.1 °C) 100 %      MAP       --         Physical Exam    Constitutional: She appears well-developed and well-nourished.   Eyes: EOM are normal. Pupils are equal, round, and reactive to light.   Cardiovascular:  Normal rate, regular rhythm and normal heart sounds.           Pulmonary/Chest: Breath sounds normal.   Abdominal: Abdomen is soft. Bowel sounds are normal.   Minimal epigastric tenderness with palpation described as a soreness   Musculoskeletal:         General: Normal range of motion.     Neurological: She is alert and oriented to person, place, and time.   Skin: Skin is warm and dry.         ED Course   Procedures  Labs Reviewed   COMPREHENSIVE METABOLIC PANEL - Abnormal       Result Value    Sodium 139      Potassium 3.7      Chloride 108      CO2 28      Glucose 99      Blood Urea Nitrogen 16      Creatinine 0.60 (*)     Calcium 9.5      Protein Total 7.3      Albumin 4.4      Globulin 2.9      Albumin/Globulin Ratio 1.5      Bilirubin Total 0.5      ALP 71      ALT 24      AST 33      eGFR >90      Anion Gap 3.0      BUN/Creatinine Ratio 27 (*)     CBC WITH DIFFERENTIAL - Abnormal    WBC 2.83 (*)     RBC 4.00      Hgb 12.1      Hct 37.3      MCV 93.3      MCH 30.3      MCHC 32.4      RDW 14.3      Platelet 86 (*)     MPV 10.2      Neut % 53.3      Lymph % 33.6      Mono % 7.8      Eos % 4.2      Basophil % 0.7      Lymph # 0.95 (*)     Neut # 1.51 (*)     Mono # 0.22 (*)     Eos # 0.12      Baso # 0.02      Imm Gran # 0.01      Imm Grans % 0.4      NRBC% 0.0     LIPASE - Normal    Lipase Level 161     TROPONIN I - Normal    Troponin-I <0.012     CBC W/ AUTO DIFFERENTIAL    Narrative:     The following orders were created for panel order CBC auto differential.  Procedure                               Abnormality         Status                     ---------                               -----------         ------                     CBC with Differential[[id number]]       Abnormal            Final result                 Please view results for these tests on the individual orders.     EKG Readings: (Independently Interpreted)   Initial Reading: No STEMI. Rhythm: Sinus Bradycardia. Heart Rate: 52. ST Segments: Normal ST Segments. T Waves: Normal.     ECG Results              EKG 12-lead (Preliminary result)  Result time 05/09/25 17:10:31      Wet Read by Dg Florez MD (05/09/25 17:10:31, [facility] Medicine)    Sinus bradycardia, heart rate 52, normal intervals, normal axis, normal P waves, normal QRS, normal ST segments, normal T-waves, normal QT.                                  Imaging Results              CT Chest Abdomen With IV Contrast (XPD) (Final result)  Result time 05/09/25 18:55:41      Final result by Wilmer Mendoza MD (05/09/25 18:55:41)                   Impression:        1. No thoracic or abdominal aortic aneurysm or dissection.  2. Similar appearing perigastric and paraesophageal varices.  3. 0.47 cm nodule in posterior left lower lobe is indeterminate.  As per Fleischner Society pulmonary  nodule recommendations, recommend a follow-up CT scan in 12 months.  Changes from cirrhosis attic steatosis.  4. Similar marked splenomegaly with 1.4 cm peripherally enhancing focus in lateral spleen unchanged since the 2024 exam and has only mildly increased in size since the 2018 exam.  This is of unknown etiology.  5. Patency of the portal vein near its confluence with the splenic vein cannot be confirmed on this exam.  6. On the prior study, there was a 0.8 cm simple cyst in the lateral midpole right kidney.  This has decreased some in size but also is more heterogeneous in density than a simple cyst.  Recommend a follow-up outpatient nonemergent renal ultrasound in 2-4 weeks for further evaluation.  7. Status post hysterectomy and cholecystectomy.      Electronically signed by: Wilmer Mendoza MD  Date:    05/09/2025  Time:    18:55               Narrative:      CT SCANS OF THE CHEST, ABDOMEN, AND PELVIS WITH CONTRAST:    CPT: 00388, 32414    Total DLP: 1025.3 mGy-cm; Automatic exposure control was utilized to limit the radiation dose to the patient.    Total contrast: 100 mL in unspecified peripheral vein.    HISTORY:Atypical chest pain worrisome for aortic aneurysm.  History of varices.    Comparison: Comparison CT with contrast from 05/20/2024 and 04/01/2018 the portal vein is grossly patent.    Technique: Multiple contiguous axial images were acquired from the thoracic inlet to the femoral trochanters with contrast administration. Coronal and sagittal reformatted images were also submitted.    FINDINGS:    The aortic root is 3.2 cm in diameter, the ascending aorta is 3.9 cm in diameter, and the descending thoracic aorta is 2.6 cm in diameter.  Abdominal aorta is normal in caliber.  There is no dissection.  The heart of normal to mildly is at the upper limits enlarged in size.  The central pulmonary vessels are normal in size.  No mediastinal or hilar lymphadenopathy.  There are prominent varices extending  from the perigastric area more prominently around the mid to distal mediastinal esophagus similar appearance to previous exam. No suspicion interstitial or alveolar opacities, other lung nodule/mass, effusion, or pneumothorax is present. There is 0.47 cm nodule in the posterior left lower lobe on series 14, image 38    There is subtle mild micronodular appearance of the liver with diffuse decreased density.  Patency of the portal vein near the splenic confluence cannot be confirmed but may just be poorly visualized due to contrast bolus timing.  No focal liver lesions are identified.  There is marked splenomegaly to 16.4  Cm.  There is a similar peripherally enhancing 1.4 cm focus in the lateral spleen from the 2024 exam that has slightly increased in size since the 2018 exam.  Size bowel gas pattern is unremarkable.  There is slightly decreased excretion of contrast in the left compared to the compared to the right.  On the prior there is a 0.8 cm nonenhancing cyst in the lateral midpole cortex of the right kidney.  On this exam, is decreased slightly in size is 0.5 cm but also become more heterogeneous in density with Hounsfield density measurements above the level for a simple cyst.  No nephroureterolithiasis or hydroureteronephrosis is present.                                       Medications   aluminum-magnesium hydroxide-simethicone 200-200-20 mg/5 mL suspension 30 mL (30 mLs Oral Given 5/9/25 1642)     And   LIDOcaine viscous HCl 2% oral solution 15 mL (15 mLs Oral Given 5/9/25 1642)   iohexoL (OMNIPAQUE 350) injection 100 mL (100 mLs Intravenous Given 5/9/25 1750)     Medical Decision Making  Amount and/or Complexity of Data Reviewed  Labs: ordered.  Radiology: ordered.    Risk  OTC drugs.  Prescription drug management.      Additional MDM:   Differential Diagnosis:   Esophageal varices esophagitis gastritis GERD peptic ulcer disease esophageal perforation, perforated viscus anxiety                                     Clinical Impression:  Final diagnoses:  [R07.9] Chest pain  [K20.90] Esophagitis (Primary)          ED Disposition Condition    Discharge Good          ED Prescriptions    None       Follow-up Information       Follow up With Specialties Details Why Contact Info    Brittany Hernandez FNP-C Family Medicine Call in 3 days  [address]  [address]  [facility]  [place] [address]  [phone]      Damián Davis MD General Surgery, Cardiology Call in 3 days  [address]  [address]  [place] [address]  [phone]                   [1]   Social History  Tobacco Use    Smoking status: Never     Passive exposure: Never    Smokeless tobacco: Never   Substance Use Topics    Alcohol use: Never    Drug use: Never        Dg Florez MD  05/09/25 9073

## 2025-05-09 NOTE — ADDENDUM NOTE
Addendum  created 05/09/25 1135 by Jian Vines CRNA    Intraprocedure Meds edited, Orders acknowledged in Narrator

## 2025-05-09 NOTE — ANESTHESIA PREPROCEDURE EVALUATION
05/09/2025  Sophy Iyer is a 64 y.o., female.      Pre-op Assessment    I have reviewed the Patient Summary Reports.     I have reviewed the Nursing Notes. I have reviewed the NPO Status.   I have reviewed the Medications.     Review of Systems  Anesthesia Hx:  No problems with previous Anesthesia             Denies Family Hx of Anesthesia complications.    Denies Personal Hx of Anesthesia complications.                    Hematology/Oncology:  Hematology Normal   Oncology Normal                                   EENT/Dental:  EENT/Dental Normal           Cardiovascular:  Cardiovascular Normal Exercise tolerance: good                                             Pulmonary:    Asthma mild Shortness of breath     Asthma:               Renal/:  Chronic Renal Disease, CKD                Hepatic/GI:     GERD Liver Disease,       Esophageal / Stomach Disorders Gerd, Esophagitis       Liver Disease        Musculoskeletal:  Musculoskeletal Normal                Neurological:    Neuromuscular Disease,  Headaches                               Neuromuscular Disease   Endocrine:  Endocrine Normal          Metabolic Disorders, Obesity / BMI > 30  Dermatological:  Skin Normal    Psych:  Psychiatric History anxiety                 Physical Exam  General: Well nourished, Cooperative, Alert and Oriented    Airway:  Mallampati: II / II  Mouth Opening: Normal  TM Distance: Normal  Tongue: Normal  Neck ROM: Normal ROM    Dental:  Intact        Anesthesia Plan  Type of Anesthesia, risks & benefits discussed:    Anesthesia Type: MAC  Intra-op Monitoring Plan: Standard ASA Monitors  Post Op Pain Control Plan: multimodal analgesia  Induction:  IV  Airway Plan: Direct  Informed Consent: Informed consent signed with the Patient and all parties understand the risks and agree with anesthesia plan.  All questions answered. Patient  consented to blood products? Yes  ASA Score: 3  Day of Surgery Review of History & Physical: H&P Update referred to the surgeon/provider.I have interviewed and examined the patient. I have reviewed the patient's H&P dated: There are no significant changes.     Ready For Surgery From Anesthesia Perspective.     .

## 2025-05-09 NOTE — PLAN OF CARE
PT IS BACK TO ROOM, AWAKE AND ALERT, IN STABLE CONDITION, NO DIFFICULTY BREATHING OR SWALLOWING, NO bleeding noted, PT IS TOLERATING LIQUIDS, FAMILY AT SIDE, SR X2, CB IN REACH

## 2025-05-09 NOTE — ANESTHESIA POSTPROCEDURE EVALUATION
Anesthesia Post Evaluation    Patient: Sophy Iyer    Procedure(s) Performed: * No procedures listed *    Final Anesthesia Type: MAC      Patient location during evaluation: GI PACU  Patient participation: Yes- Able to Participate  Level of consciousness: awake and alert, awake and oriented  Post-procedure vital signs: reviewed and stable  Pain management: adequate  Airway patency: patent    PONV status at discharge: No PONV  Anesthetic complications: no      Cardiovascular status: blood pressure returned to baseline  Respiratory status: unassisted, room air and spontaneous ventilation  Hydration status: euvolemic  Follow-up not needed.              Vitals Value Taken Time   /85 05/09/25 10:56   Temp 37 05/09/25 10:56   Pulse 66 05/09/25 10:56   Resp 16 05/09/25 10:56   SpO2 98 05/09/25 10:56         No case tracking events are documented in the log.      Pain/Lucia Score: No data recorded

## 2025-05-09 NOTE — DISCHARGE INSTRUCTIONS
Follow-up with Dr. KWAN ON FRIDAY, 5/16/25 @ 5:45AM IN [place]  Diet:  BLAND  Activity:  decrease activity today, no driving today, resume all activity tomorrow  Notify MD:  increased swelling of abdomen, difficulty breathing/swallowing, excessive nausea/vomiting, excessive bright red bleeding from mouth/vomit,  Medications:  continue your home medications, keep a list of your home medications at all times for emergencies.

## 2025-05-11 ENCOUNTER — PATIENT MESSAGE (OUTPATIENT)
Dept: FAMILY MEDICINE | Facility: CLINIC | Age: 65
End: 2025-05-11
Payer: MEDICARE

## 2025-05-12 LAB
OHS QRS DURATION: 80 MS
OHS QTC CALCULATION: 394 MS

## 2025-05-13 ENCOUNTER — OFFICE VISIT (OUTPATIENT)
Dept: BEHAVIORAL HEALTH | Facility: CLINIC | Age: 65
End: 2025-05-13
Payer: MEDICARE

## 2025-05-13 VITALS
HEART RATE: 63 BPM | WEIGHT: 167 LBS | OXYGEN SATURATION: 97 % | HEIGHT: 59 IN | BODY MASS INDEX: 33.67 KG/M2 | SYSTOLIC BLOOD PRESSURE: 122 MMHG | TEMPERATURE: 98 F | DIASTOLIC BLOOD PRESSURE: 78 MMHG

## 2025-05-13 DIAGNOSIS — F41.1 GENERALIZED ANXIETY DISORDER: ICD-10-CM

## 2025-05-13 DIAGNOSIS — G47.00 INSOMNIA, UNSPECIFIED TYPE: ICD-10-CM

## 2025-05-13 DIAGNOSIS — F31.9 BIPOLAR AFFECTIVE DISORDER, REMISSION STATUS UNSPECIFIED: Primary | ICD-10-CM

## 2025-05-13 DIAGNOSIS — G25.81 RESTLESS LEGS SYNDROME: ICD-10-CM

## 2025-05-13 PROCEDURE — 3008F BODY MASS INDEX DOCD: CPT | Mod: ,,, | Performed by: NURSE PRACTITIONER

## 2025-05-13 PROCEDURE — 3044F HG A1C LEVEL LT 7.0%: CPT | Mod: ,,, | Performed by: NURSE PRACTITIONER

## 2025-05-13 PROCEDURE — 3078F DIAST BP <80 MM HG: CPT | Mod: ,,, | Performed by: NURSE PRACTITIONER

## 2025-05-13 PROCEDURE — 3074F SYST BP LT 130 MM HG: CPT | Mod: ,,, | Performed by: NURSE PRACTITIONER

## 2025-05-13 PROCEDURE — 99214 OFFICE O/P EST MOD 30 MIN: CPT | Mod: ,,, | Performed by: NURSE PRACTITIONER

## 2025-05-13 PROCEDURE — 1157F ADVNC CARE PLAN IN RCRD: CPT | Mod: ,,, | Performed by: NURSE PRACTITIONER

## 2025-05-13 PROCEDURE — 1160F RVW MEDS BY RX/DR IN RCRD: CPT | Mod: ,,, | Performed by: NURSE PRACTITIONER

## 2025-05-13 PROCEDURE — 1159F MED LIST DOCD IN RCRD: CPT | Mod: ,,, | Performed by: NURSE PRACTITIONER

## 2025-05-13 PROCEDURE — 1125F AMNT PAIN NOTED PAIN PRSNT: CPT | Mod: ,,, | Performed by: NURSE PRACTITIONER

## 2025-05-13 RX ORDER — TRAZODONE HYDROCHLORIDE 150 MG/1
TABLET ORAL
Qty: 90 TABLET | Refills: 2 | Status: SHIPPED | OUTPATIENT
Start: 2025-05-13

## 2025-05-13 RX ORDER — PAROXETINE 30 MG/1
60 TABLET, FILM COATED ORAL DAILY
Qty: 60 TABLET | Refills: 2 | Status: SHIPPED | OUTPATIENT
Start: 2025-05-13

## 2025-05-13 RX ORDER — ESOMEPRAZOLE MAGNESIUM 40 MG/1
40 CAPSULE, DELAYED RELEASE ORAL
COMMUNITY

## 2025-05-13 RX ORDER — CARBAMAZEPINE 200 MG/1
200 TABLET ORAL 2 TIMES DAILY
Qty: 60 TABLET | Refills: 2 | Status: SHIPPED | OUTPATIENT
Start: 2025-05-13

## 2025-05-13 NOTE — PROGRESS NOTES
"PSYCHIATRIC FOLLOW-UP VISIT NOTE    Chief Complaint   Patient presents with    Medication Management     Medication management         History of Present Illness  64 y.o. year old White female with hx of bipolar disorder, generalized anxiety disorder, RLS, and insomnia seen today for follow-up appointment and medication management.  Patient reports that she has been doing very well since our last visit.  Has had minimal depression and anxiety.  She was unable to tolerate BuSpar, which we initiated for increased anxiety at last visit.  She is tolerating current stressors well and without significant difficulty in her day-to-day life.  Mood has been stable and she denies any mood lability, increased impulsivity, or decreased need for sleep.  Recently had a colonoscopy and EGD and will receive those results this Friday.  From a psychiatric perspective she is without any acute complaints today.  Denies any side effects from current medication regimen. Patient denies SI/HI. Denies hallucinations and does not appear to be responding to internal stimuli or be internally preoccupied. No manic symptoms noted.       Objective:     Vitals:  Vitals:    05/13/25 0954   BP: 122/78   BP Location: Right arm   Patient Position: Sitting   Pulse: 63   Temp: 98.1 °F (36.7 °C)   TempSrc: Temporal   SpO2: 97%   Weight: 75.8 kg (167 lb)   Height: 4' 11.02" (1.499 m)       Wt Readings from Last 3 Encounters:   05/13/25 0954 75.8 kg (167 lb)   05/09/25 1621 76.7 kg (169 lb)   05/09/25 0832 77.1 kg (169 lb 15.6 oz)         Medication:  Current Medications[1]       Significant Labs: - none at this time    Significant Imaging: - none at this time    Physical Exam     See HPI    Review of Systems     Mental Status Exam:  Presentation:  - Appearance: 64 y.o. year old White female, appears stated age, appears Casually dressed and Well groomed  - Motility: Erect when standing, Steady gait, No EPS or Tremors, No psychomotor agitation or retardation " "appreciated  - Behavior: calm, cooperative, good eye contact  Speech:  - Character/Organization: spontaneous, fluent, normal volume, normal rate, normal rhythm  Emotional State:  - Mood: "content"   - Affect: congruent and appropriate  Thought:  - Process: logical, linear, organized , goal-directed  - Preoccupations: no ruminations, rituals, or phobias appreciated  - Delusions: no persecutory, paranoid, or grandiose delusions appreciated  - Perception: denies AVH, not actively responding to internal stimuli  - SI/HI: denies/denies  Sensorium & Intellect:  - Sensorium: AAOx4  - Memory: intact to recent and remote events  - Attention/Concentration: good/good  - Insight/Judgement: good/good    Gait: normal swing and stance  MSK:no rigidity appreciated    All other systems without acute issues unless noted in HPI      Assessment/Plan      ICD-10-CM ICD-9-CM    1. Bipolar affective disorder, remission status unspecified  F31.9 296.80 paroxetine (PAXIL) 30 MG tablet      carBAMazepine (TEGRETOL) 200 mg tablet      2. Generalized anxiety disorder  F41.1 300.02 paroxetine (PAXIL) 30 MG tablet      3. Insomnia, unspecified type  G47.00 780.52 traZODone (DESYREL) 150 MG tablet      4. Restless legs syndrome  G25.81 333.94          Continue current medications without change    Potential side effects and risks vs benefits of current treatment plan reviewed with patient. Applicable black box warnings reviewed. Encouraged patient not to alter dosages or abruptly discontinue medications without contacting prescriber first, due to risk of worsening symptoms and decompensation of mental status. Warned of risks associated with herbal remedies and supplements while taking psychotropic medications and of the need to consult prescriber prior to adding any of these to current regimen. Patient should abstain from abuse of alcohol, prescription medications, and illicit drugs. Reviewed when to contact clinic and/or seek emergent care, such " as but not limited to, onset/worsening SI/HI, hallucinations, delusions, manic symptoms. Pt verbalized understanding and agreement of these warnings/recommendations and verbally consented to treatment plan.      Follow up in about 3 months (around 8/13/2025) for Medication Management.        Matthew Ferreira, PMHNP         [1]   Current Outpatient Medications:     atorvastatin (LIPITOR) 40 MG tablet, Take 1 tablet (40 mg total) by mouth every evening., Disp: 90 tablet, Rfl: 3    diphenhydrAMINE (BENADRYL) 25 mg capsule, Take 25 mg by mouth every evening., Disp: , Rfl:     ergocalciferol (ERGOCALCIFEROL) 50,000 unit Cap, Take 1 capsule (50,000 Units total) by mouth every 7 days., Disp: 12 capsule, Rfl: 3    esomeprazole (NEXIUM) 40 MG capsule, Take 40 mg by mouth before breakfast., Disp: , Rfl:     ketoconazole (NIZORAL) 2 % cream, Apply topically once daily., Disp: 60 g, Rfl: 3    meclizine (ANTIVERT) 25 mg tablet, Take 12.5 mg by mouth 3 (three) times daily as needed for Dizziness., Disp: , Rfl:     multivitamin with minerals tablet, Take 1 tablet by mouth once daily., Disp: , Rfl:     rOPINIRole (REQUIP) 2 MG tablet, Take 1 tablet (2 mg total) by mouth 2 (two) times a day., Disp: 180 tablet, Rfl: 0    vibegron (GEMTESA) 75 mg Tab, Take 1 tablet (75 mg total) by mouth once daily., Disp: 90 tablet, Rfl: 0    carBAMazepine (TEGRETOL) 200 mg tablet, Take 1 tablet (200 mg total) by mouth 2 (two) times daily., Disp: 60 tablet, Rfl: 2    paroxetine (PAXIL) 30 MG tablet, Take 2 tablets (60 mg total) by mouth once daily., Disp: 60 tablet, Rfl: 2    rizatriptan (MAXALT) 10 MG tablet, Take 1 tablet (10 mg total) by mouth once as needed for Migraine., Disp: 16 tablet, Rfl: 5    traZODone (DESYREL) 150 MG tablet, TAKE 1 TABLET AT BEDTIME AS NEEDED FOR INSOMNIA, Disp: 90 tablet, Rfl: 2

## 2025-05-13 NOTE — DISCHARGE SUMMARY
[facility]  Discharge Note  Short Stay    EGD      OUTCOME: Patient tolerated treatment/procedure well without complication and is now ready for discharge.    DISPOSITION: Home or Self Care    FINAL DIAGNOSIS:  <principal problem not specified>    FOLLOWUP: In clinic    DISCHARGE INSTRUCTIONS:  No discharge procedures on file.      Clinical Reference Documents Added to Patient Instructions         Document    [facility] ENDOSCOPY DISCHARGE INSTRUCTIONS (ENGLISH)            TIME SPENT ON DISCHARGE: 5 minutes

## 2025-05-19 DIAGNOSIS — N32.89 BLADDER SPASMS: ICD-10-CM

## 2025-05-19 RX ORDER — VIBEGRON 75 MG/1
75 TABLET, FILM COATED ORAL DAILY
Qty: 90 TABLET | Refills: 1 | Status: SHIPPED | OUTPATIENT
Start: 2025-05-19

## 2025-05-22 DIAGNOSIS — I85.10 ESOPHAGEAL VARICES IN CIRRHOSIS: ICD-10-CM

## 2025-05-22 DIAGNOSIS — K74.60 ESOPHAGEAL VARICES IN CIRRHOSIS: ICD-10-CM

## 2025-05-22 RX ORDER — PANTOPRAZOLE SODIUM 40 MG/1
40 TABLET, DELAYED RELEASE ORAL
Qty: 30 TABLET | Refills: 11 | Status: SHIPPED | OUTPATIENT
Start: 2025-05-22

## 2025-06-04 ENCOUNTER — HOSPITAL ENCOUNTER (OUTPATIENT)
Dept: RADIOLOGY | Facility: HOSPITAL | Age: 65
Discharge: HOME | End: 2025-06-04
Attending: SURGERY
Payer: MEDICARE

## 2025-06-04 DIAGNOSIS — Z12.31 SCREENING MAMMOGRAM, ENCOUNTER FOR: ICD-10-CM

## 2025-06-04 PROCEDURE — 77067 SCR MAMMO BI INCL CAD: CPT | Mod: TC

## 2025-06-18 ENCOUNTER — PATIENT MESSAGE (OUTPATIENT)
Dept: FAMILY MEDICINE | Facility: CLINIC | Age: 65
End: 2025-06-18
Payer: MEDICARE

## 2025-07-03 ENCOUNTER — HOSPITAL ENCOUNTER (OUTPATIENT)
Dept: RADIOLOGY | Facility: HOSPITAL | Age: 65
Discharge: HOME | End: 2025-07-03
Attending: SURGERY
Payer: MEDICARE

## 2025-07-03 ENCOUNTER — HOSPITAL ENCOUNTER (OUTPATIENT)
Dept: PREADMISSION TESTING | Facility: HOSPITAL | Age: 65
Discharge: HOME | End: 2025-07-03
Attending: SURGERY
Payer: MEDICARE

## 2025-07-03 VITALS — WEIGHT: 180 LBS | BODY MASS INDEX: 36.29 KG/M2 | HEIGHT: 59 IN

## 2025-07-03 DIAGNOSIS — Z01.818 PRE-OP TESTING: ICD-10-CM

## 2025-07-03 DIAGNOSIS — G56.02 CARPAL TUNNEL SYNDROME ON LEFT: Primary | ICD-10-CM

## 2025-07-03 LAB
ALBUMIN SERPL-MCNC: 4.4 G/DL (ref 3.4–5)
ALBUMIN/GLOB SERPL: 1.6 RATIO
ALP SERPL-CCNC: 70 UNIT/L (ref 50–144)
ALT SERPL-CCNC: 23 UNIT/L (ref 1–45)
ANION GAP SERPL CALC-SCNC: 4 MEQ/L (ref 2–13)
APTT PPP: 23.9 SECONDS (ref 23–29.4)
AST SERPL-CCNC: 32 UNIT/L (ref 14–36)
BASOPHILS # BLD AUTO: 0.02 X10(3)/MCL (ref 0.01–0.08)
BASOPHILS NFR BLD AUTO: 0.8 % (ref 0.1–1.2)
BILIRUB SERPL-MCNC: 0.8 MG/DL (ref 0–1)
BUN SERPL-MCNC: 14 MG/DL (ref 7–20)
CALCIUM SERPL-MCNC: 9.3 MG/DL (ref 8.4–10.2)
CHLORIDE SERPL-SCNC: 109 MMOL/L (ref 98–110)
CO2 SERPL-SCNC: 27 MMOL/L (ref 21–32)
CREAT SERPL-MCNC: 0.67 MG/DL (ref 0.66–1.25)
CREAT/UREA NIT SERPL: 21 (ref 12–20)
EOSINOPHIL # BLD AUTO: 0.11 X10(3)/MCL (ref 0.04–0.36)
EOSINOPHIL NFR BLD AUTO: 4.5 % (ref 0.7–7)
ERYTHROCYTE [DISTWIDTH] IN BLOOD BY AUTOMATED COUNT: 15.8 % (ref 11–14.5)
GFR SERPLBLD CREATININE-BSD FMLA CKD-EPI: >90 ML/MIN/1.73/M2
GLOBULIN SER-MCNC: 2.7 GM/DL (ref 2–3.9)
GLUCOSE SERPL-MCNC: 104 MG/DL (ref 70–115)
HCT VFR BLD AUTO: 39.8 % (ref 36–48)
HGB BLD-MCNC: 12.6 G/DL (ref 11.8–16)
IMM GRANULOCYTES # BLD AUTO: 0 X10(3)/MCL (ref 0–0.03)
IMM GRANULOCYTES NFR BLD AUTO: 0 % (ref 0–0.5)
INR PPP: 1.1
LYMPHOCYTES # BLD AUTO: 0.75 X10(3)/MCL (ref 1.16–3.74)
LYMPHOCYTES NFR BLD AUTO: 30.7 % (ref 20–55)
MCH RBC QN AUTO: 29.6 PG (ref 27–34)
MCHC RBC AUTO-ENTMCNC: 31.7 G/DL (ref 31–37)
MCV RBC AUTO: 93.4 FL (ref 79–99)
MONOCYTES # BLD AUTO: 0.18 X10(3)/MCL (ref 0.24–0.36)
MONOCYTES NFR BLD AUTO: 7.4 % (ref 4.7–12.5)
NEUTROPHILS # BLD AUTO: 1.38 X10(3)/MCL (ref 1.56–6.13)
NEUTROPHILS NFR BLD AUTO: 56.6 % (ref 37–73)
NRBC BLD AUTO-RTO: 0 %
PLATELET # BLD AUTO: 93 X10(3)/MCL (ref 140–371)
PMV BLD AUTO: 11.2 FL (ref 9.4–12.4)
POTASSIUM SERPL-SCNC: 3.7 MMOL/L (ref 3.5–5.1)
PROT SERPL-MCNC: 7.1 GM/DL (ref 6.3–8.2)
PROTHROMBIN TIME: 11.5 SECONDS (ref 9.3–11.9)
RBC # BLD AUTO: 4.26 X10(6)/MCL (ref 4–5.1)
SODIUM SERPL-SCNC: 140 MMOL/L (ref 136–145)
WBC # BLD AUTO: 2.44 X10(3)/MCL (ref 4–11.5)

## 2025-07-03 PROCEDURE — 85025 COMPLETE CBC W/AUTO DIFF WBC: CPT | Performed by: SURGERY

## 2025-07-03 PROCEDURE — 80053 COMPREHEN METABOLIC PANEL: CPT | Performed by: SURGERY

## 2025-07-03 PROCEDURE — 71046 X-RAY EXAM CHEST 2 VIEWS: CPT | Mod: TC

## 2025-07-03 PROCEDURE — 85610 PROTHROMBIN TIME: CPT | Performed by: SURGERY

## 2025-07-03 PROCEDURE — 85730 THROMBOPLASTIN TIME PARTIAL: CPT | Performed by: SURGERY

## 2025-07-03 RX ORDER — SODIUM CHLORIDE, SODIUM LACTATE, POTASSIUM CHLORIDE, CALCIUM CHLORIDE 600; 310; 30; 20 MG/100ML; MG/100ML; MG/100ML; MG/100ML
INJECTION, SOLUTION INTRAVENOUS CONTINUOUS
OUTPATIENT
Start: 2025-07-11

## 2025-07-03 RX ORDER — CLINDAMYCIN PHOSPHATE 600 MG/50ML
600 INJECTION, SOLUTION INTRAVENOUS
OUTPATIENT
Start: 2025-07-11

## 2025-07-03 RX ORDER — DEXTROSE MONOHYDRATE AND SODIUM CHLORIDE 5; .45 G/100ML; G/100ML
INJECTION, SOLUTION INTRAVENOUS CONTINUOUS
OUTPATIENT
Start: 2025-07-11

## 2025-07-03 NOTE — DISCHARGE INSTRUCTIONS

## 2025-07-09 ENCOUNTER — ANESTHESIA EVENT (OUTPATIENT)
Dept: SURGERY | Facility: HOSPITAL | Age: 65
End: 2025-07-09
Payer: MEDICARE

## 2025-07-09 NOTE — ANESTHESIA PREPROCEDURE EVALUATION
07/09/2025  Sophy Iyer is a 64 y.o., female.  Procedure Information    Case: [id number] Date/Time: 07/11/25 0945   Procedure: RELEASE, CARPAL TUNNEL (Left: Hand)   Anesthesia type: General/MAC   Diagnosis: Carpal tunnel syndrome on left [G56.02]   Pre-op diagnosis: Carpal tunnel syndrome on left [G56.02]   Location: [facility] OR  / 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     Kidney stone      Left shoulder pain      Migraine      Mixed hyperlipidemia      Obesity, unspecified      Restless legs syndrome (RLS)      Trapezius muscle spasm      Vitamin D deficiency         Latest Reference Range & Units 07/03/25 08:26   WBC 4.00 - 11.50 x10(3)/mcL 2.44 (L)   RBC 4.00 - 5.10 x10(6)/mcL 4.26   Hemoglobin 11.8 - 16.0 g/dL 12.6   Hematocrit 36.0 - 48.0 % 39.8   MCV 79.0 - 99.0 fL 93.4   MCH 27.0 - 34.0 pg 29.6   MCHC 31.0 - 37.0 g/dL 31.7   RDW 11.0 - 14.5 % 15.8 (H)   Platelet Count 140 - 371 x10(3)/mcL 93 (L)   MPV 9.4 - 12.4 fL 11.2   Neut % 37 - 73 % 56.6   LYMPH % 20 - 55 % 30.7   Mono % 4.7 - 12.5 % 7.4   Eos % 0.7 - 7 % 4.5   Basophil % 0.1 - 1.2 % 0.8   Immature Granulocytes 0 - 0.5 % 0.0   Neut # 1.56 - 6.13 x10(3)/mcL 1.38 (L)   Lymph # 1.16 - 3.74 x10(3)/mcL 0.75 (L)   Mono # 0.24 - 0.36 x10(3)/mcL 0.18 (L)   Eos # 0.04 - 0.36 x10(3)/mcL 0.11   Baso # 0.01 - 0.08 x10(3)/mcL 0.02    Immature Grans (Abs) 0.00 - 0.03 x10(3)/mcL 0.00   nRBC <=1 % 0.0   (L): Data is abnormally low  (H): Data is abnormally high   Latest Reference Range & Units 07/03/25 08:26   Sodium 136 - 145 mmol/L 140   Potassium 3.5 - 5.1 mmol/L 3.7   Chloride 98 - 110 mmol/L 109   CO2 21 - 32 mmol/L 27   Anion Gap 2.0 - 13.0 mEq/L 4.0   BUN 7.0 - 20.0 mg/dL 14   Creatinine 0.66 - 1.25 mg/dL 0.67   BUN/CREAT RATIO 12 - 20  21 (H)   eGFR mL/min/1.73/m2 >90   Glucose 70 - 115 mg/dL 104   Calcium 8.4 - 10.2 mg/dL 9.3   ALP 50 - 144 unit/L 70   PROTEIN TOTAL 6.3 - 8.2 gm/dL 7.1   Albumin 3.4 - 5.0 g/dL 4.4   Albumin/Globulin Ratio ratio 1.6   BILIRUBIN TOTAL 0.0 - 1.0 mg/dL 0.8   AST 14 - 36 unit/L 32   ALT 1 - 45 unit/L 23   (H): Data is abnormally high  Pre-op Assessment    I have reviewed the Patient Summary Reports.     I have reviewed the Nursing Notes. I have reviewed the NPO Status.   I have reviewed the Medications.     Review of Systems  Anesthesia Hx:  No problems with previous Anesthesia             Denies Family Hx of Anesthesia complications.    Denies Personal Hx of Anesthesia complications.                    Social:  Non-Smoker       Hematology/Oncology:    Oncology Normal    -- Anemia:                                  EENT/Dental:  EENT/Dental Normal           Cardiovascular:  Exercise tolerance: poor    Valvular problems/Murmurs          hyperlipidemia COPELAND  ECG has been reviewed.                            Pulmonary:    Asthma asymptomatic Shortness of breath                  Renal/:  Chronic Renal Disease renal calculi               Hepatic/GI:     GERD, well controlled Liver Disease, Hepatitis              Musculoskeletal:  Musculoskeletal Normal                Neurological:    Neuromuscular Disease,  Headaches     RLS                            Endocrine:  Endocrine Normal          Obesity / BMI > 30  Dermatological:  Skin Normal    Psych:  Psychiatric History anxiety depression Bipolar                Physical Exam  General: Well nourished, Cooperative, Alert and Oriented    Airway:  Mallampati: II / II  Mouth Opening: Normal  TM Distance: Normal  Tongue: Normal  Neck ROM: Normal ROM    Dental:  Intact, Dentures  Upper dentures      Anesthesia Plan  Type of Anesthesia, risks & benefits discussed:    Anesthesia Type: MAC  Intra-op Monitoring Plan: Standard ASA Monitors  Post Op Pain Control Plan: multimodal analgesia  Induction:  IV  Airway Plan: Direct  Informed Consent: Informed consent signed with the Patient and all parties understand the risks and agree with anesthesia plan.  All questions answered. Patient consented to blood products? Yes  ASA Score: 3  Day of Surgery Review of History & Physical: H&P Update referred to the surgeon/provider.I have interviewed and examined the patient. I have reviewed the patient's H&P dated: There are no significant changes.     Ready For Surgery From Anesthesia Perspective.     .

## 2025-07-11 ENCOUNTER — HOSPITAL ENCOUNTER (OUTPATIENT)
Facility: HOSPITAL | Age: 65
Discharge: HOME | End: 2025-07-11
Attending: SURGERY | Admitting: SURGERY
Payer: MEDICARE

## 2025-07-11 ENCOUNTER — ANESTHESIA (OUTPATIENT)
Dept: SURGERY | Facility: HOSPITAL | Age: 65
End: 2025-07-11
Payer: MEDICARE

## 2025-07-11 VITALS
OXYGEN SATURATION: 93 % | TEMPERATURE: 98 F | BODY MASS INDEX: 36.26 KG/M2 | WEIGHT: 179.88 LBS | DIASTOLIC BLOOD PRESSURE: 68 MMHG | HEIGHT: 59 IN | RESPIRATION RATE: 20 BRPM | HEART RATE: 50 BPM | SYSTOLIC BLOOD PRESSURE: 118 MMHG

## 2025-07-11 DIAGNOSIS — G56.02 CARPAL TUNNEL SYNDROME ON LEFT: ICD-10-CM

## 2025-07-11 PROCEDURE — S5010 5% DEXTROSE AND 0.45% SALINE: HCPCS | Performed by: SURGERY

## 2025-07-11 PROCEDURE — 71000015 HC POSTOP RECOV 1ST HR: Performed by: SURGERY

## 2025-07-11 PROCEDURE — 25000003 PHARM REV CODE 250: Performed by: SURGERY

## 2025-07-11 PROCEDURE — 37000009 HC ANESTHESIA EA ADD 15 MINS: Performed by: SURGERY

## 2025-07-11 PROCEDURE — 37000008 HC ANESTHESIA 1ST 15 MINUTES: Performed by: SURGERY

## 2025-07-11 PROCEDURE — 36000706: Performed by: SURGERY

## 2025-07-11 PROCEDURE — 63600175 PHARM REV CODE 636 W HCPCS: Performed by: SURGERY

## 2025-07-11 PROCEDURE — 71000016 HC POSTOP RECOV ADDL HR: Performed by: SURGERY

## 2025-07-11 PROCEDURE — 36000707: Performed by: SURGERY

## 2025-07-11 PROCEDURE — 63600175 PHARM REV CODE 636 W HCPCS: Mod: JZ,TB | Performed by: SURGERY

## 2025-07-11 PROCEDURE — 63600175 PHARM REV CODE 636 W HCPCS: Performed by: NURSE ANESTHETIST, CERTIFIED REGISTERED

## 2025-07-11 RX ORDER — KETOROLAC TROMETHAMINE 30 MG/ML
INJECTION, SOLUTION INTRAMUSCULAR; INTRAVENOUS
Status: DISCONTINUED | OUTPATIENT
Start: 2025-07-11 | End: 2025-07-11

## 2025-07-11 RX ORDER — GLYCOPYRROLATE 0.2 MG/ML
0.2 INJECTION INTRAMUSCULAR; INTRAVENOUS
Status: COMPLETED | OUTPATIENT
Start: 2025-07-11 | End: 2025-07-11

## 2025-07-11 RX ORDER — FAMOTIDINE 20 MG/1
20 TABLET, FILM COATED ORAL
Status: COMPLETED | OUTPATIENT
Start: 2025-07-11 | End: 2025-07-11

## 2025-07-11 RX ORDER — ONDANSETRON HYDROCHLORIDE 2 MG/ML
INJECTION, SOLUTION INTRAVENOUS
Status: DISCONTINUED | OUTPATIENT
Start: 2025-07-11 | End: 2025-07-11

## 2025-07-11 RX ORDER — BUPIVACAINE HYDROCHLORIDE 5 MG/ML
INJECTION, SOLUTION EPIDURAL; INTRACAUDAL; PERINEURAL
Status: DISCONTINUED | OUTPATIENT
Start: 2025-07-11 | End: 2025-07-11 | Stop reason: HOSPADM

## 2025-07-11 RX ORDER — MIDAZOLAM HYDROCHLORIDE 1 MG/ML
2 INJECTION INTRAMUSCULAR; INTRAVENOUS
Status: COMPLETED | OUTPATIENT
Start: 2025-07-11 | End: 2025-07-11

## 2025-07-11 RX ORDER — PROPOFOL 10 MG/ML
VIAL (ML) INTRAVENOUS
Status: DISCONTINUED | OUTPATIENT
Start: 2025-07-11 | End: 2025-07-11

## 2025-07-11 RX ORDER — SODIUM CHLORIDE, SODIUM LACTATE, POTASSIUM CHLORIDE, CALCIUM CHLORIDE 600; 310; 30; 20 MG/100ML; MG/100ML; MG/100ML; MG/100ML
INJECTION, SOLUTION INTRAVENOUS CONTINUOUS
Status: DISCONTINUED | OUTPATIENT
Start: 2025-07-11 | End: 2025-07-11 | Stop reason: HOSPADM

## 2025-07-11 RX ORDER — CLINDAMYCIN PHOSPHATE 600 MG/50ML
600 INJECTION, SOLUTION INTRAVENOUS
Status: DISCONTINUED | OUTPATIENT
Start: 2025-07-11 | End: 2025-07-11 | Stop reason: HOSPADM

## 2025-07-11 RX ORDER — DEXTROSE MONOHYDRATE AND SODIUM CHLORIDE 5; .45 G/100ML; G/100ML
INJECTION, SOLUTION INTRAVENOUS CONTINUOUS
Status: DISCONTINUED | OUTPATIENT
Start: 2025-07-11 | End: 2025-07-11 | Stop reason: HOSPADM

## 2025-07-11 RX ORDER — FENTANYL CITRATE 50 UG/ML
INJECTION, SOLUTION INTRAMUSCULAR; INTRAVENOUS
Status: DISCONTINUED | OUTPATIENT
Start: 2025-07-11 | End: 2025-07-11

## 2025-07-11 RX ORDER — LIDOCAINE HYDROCHLORIDE 20 MG/ML
INJECTION INTRAVENOUS
Status: DISCONTINUED | OUTPATIENT
Start: 2025-07-11 | End: 2025-07-11

## 2025-07-11 RX ORDER — LIDOCAINE HYDROCHLORIDE 10 MG/ML
INJECTION, SOLUTION INFILTRATION; PERINEURAL
Status: DISCONTINUED | OUTPATIENT
Start: 2025-07-11 | End: 2025-07-11 | Stop reason: HOSPADM

## 2025-07-11 RX ADMIN — FENTANYL CITRATE 25 MCG: 50 INJECTION, SOLUTION INTRAMUSCULAR; INTRAVENOUS at 09:07

## 2025-07-11 RX ADMIN — MIDAZOLAM HYDROCHLORIDE 2 MG: 1 INJECTION, SOLUTION INTRAMUSCULAR; INTRAVENOUS at 09:07

## 2025-07-11 RX ADMIN — GLYCOPYRROLATE 0.2 MG: 0.2 INJECTION INTRAMUSCULAR; INTRAVENOUS at 07:07

## 2025-07-11 RX ADMIN — DEXTROSE AND SODIUM CHLORIDE: 5; 450 INJECTION, SOLUTION INTRAVENOUS at 08:07

## 2025-07-11 RX ADMIN — PROPOFOL 20 MG: 10 INJECTION, EMULSION INTRAVENOUS at 09:07

## 2025-07-11 RX ADMIN — PROPOFOL 20 MG: 10 INJECTION, EMULSION INTRAVENOUS at 10:07

## 2025-07-11 RX ADMIN — KETOROLAC TROMETHAMINE 30 MG: 30 INJECTION, SOLUTION INTRAMUSCULAR; INTRAVENOUS at 10:07

## 2025-07-11 RX ADMIN — LIDOCAINE HYDROCHLORIDE 20 MG: 20 INJECTION, SOLUTION INTRAVENOUS at 09:07

## 2025-07-11 RX ADMIN — CLINDAMYCIN IN 5 PERCENT DEXTROSE 600 MG: 12 INJECTION, SOLUTION INTRAVENOUS at 09:07

## 2025-07-11 RX ADMIN — ONDANSETRON 4 MG: 2 INJECTION INTRAMUSCULAR; INTRAVENOUS at 09:07

## 2025-07-11 RX ADMIN — FAMOTIDINE 20 MG: 20 TABLET, FILM COATED ORAL at 07:07

## 2025-07-11 NOTE — PLAN OF CARE
PT IS BACK TO ROOM, AROUSED PER VOICE, IN STABLE CONDITION, NO DIFFICULTY BREATHING, NO INCISIONAL BLEEDING NOTED,, PT IS TOLERATING LIQUIDS, SR X2, CB IN REACH. FREQUENT VITALS IN PROGRESS. RUE ELEVATED ON PILLOW.

## 2025-07-11 NOTE — H&P
The patient has been examined and the H&P has been reviewed:    I concur with the findings and no changes have occurred since H&P was written.    Staff present during examination : Franca Oh RN    Patient cleared for Anesthesia: General vs local/mac    Anesthesia/Surgery risks, benefits and alternative options discussed and understood by patient/family.    I have reviewed and agree with Anesthesia Plan of Care.    There are no hospital problems to display for this patient.

## 2025-07-11 NOTE — OP NOTE
[facility]  Operative Note    SUMMARY     Date of Procedure: 7/11/2025     Procedure: Procedure(s) (LRB):  RELEASE, CARPAL TUNNEL (Left)     Surgeons and Role:     * Damián Davis MD - Primary    Assisting Surgeon: None    Pre-Operative Diagnosis: Carpal tunnel syndrome on left [G56.02]    Post-Operative Diagnosis: Post-Op Diagnosis Codes:     * Carpal tunnel syndrome on left [G56.02]  Procedure(s) (LRB):  RELEASE, CARPAL TUNNEL (Left)  Anesthesia: General/MAC    Description of the Procedure:  Patient is a 64-year-old with carpal tunnel syndrome requiring carpal tunnel release.  Patient was brought to the OR underwent local and IV sedation.  Patient had tourniquet applied.  S marking was done of the arm.  Patient underwent incision along the palmar crease and dissection through skin subcutaneous tissue down to the carpal fascia.  The carpal fascia was opened and the nerve was released.  Median nerve was free of any scar tissue.  Patient then underwent closure of the skin and subcutaneous tissue with interrupted 4-0 nylon and 4-0 plain gut suture.  Sterile dressings were applied no problems were encountered patient tolerated procedure well.        Complications:  No    Estimated Blood Loss (EBL): * No values recorded between 7/11/2025  9:28 AM and 7/11/2025 10:11 AM *           Implants: * No implants in log *    Specimens:   Specimen (24h ago, onward)      None                    Condition: Good    Disposition: PACU - hemodynamically stable.

## 2025-07-11 NOTE — DISCHARGE INSTRUCTIONS
POST-OP INSTRUCTIONS FOR CARPAL TUNNEL RELEASE         POST-OP INSTRUCTIONS FOR CARPAL TUNNEL RELEASE          1.  Keep splint dry and clean.     2.  Patient may remove splint two (2) days after surgery.  Remove           splint sooner in case of severe pain or swelling of the hand.     3.  Keep incision dry and clean.  Clean incision with antibacterial           soap and water.     4.  Do not soak incision in water.     5.  No heavy lifting with hand.     6. Apply ice as needed. 20 minutes on, 20 minutes off    7.  Patient may do very light activity with hand., keeping splint in place at all times. And covering incision with dry dressing daily.     8.  Call MD or staff in case of severe pain, redness, or           excessive drainage from the incision.

## 2025-07-11 NOTE — ANESTHESIA POSTPROCEDURE EVALUATION
Anesthesia Post Evaluation    Patient: Sophy Iyer    Procedure(s) Performed: Procedure(s) (LRB):  RELEASE, CARPAL TUNNEL (Left)    Final Anesthesia Type: MAC      Patient location during evaluation: OPS  Patient participation: Yes- Able to Participate  Level of consciousness: awake and alert, awake and oriented  Post-procedure vital signs: reviewed and stable  Pain management: adequate  Airway patency: patent  LINDA mitigation strategies: Preoperative initiation of continuous positive airway pressure (CPAP) or non-invasive positive pressure ventilation (NIPPV)  PONV status at discharge: No PONV  Anesthetic complications: no      Cardiovascular status: blood pressure returned to baseline  Respiratory status: unassisted, room air and spontaneous ventilation  Hydration status: euvolemic  Follow-up not needed.              Vitals Value Taken Time   /65 07/11/25 07:33   Temp 36.7 °C (98 °F) 07/11/25 07:33   Pulse 60 07/11/25 07:33   Resp 19 07/11/25 07:33   SpO2 98 % 07/11/25 07:33         No case tracking events are documented in the log.      Pain/Lucia Score: No data recorded

## 2025-07-11 NOTE — PLAN OF CARE
Discharge instructions provided to patient and family, allowed time for questions, verbalized understanding.    Discharged home in stable condition via wheel chair, accompanied by family, no s/s of distress noted   RD has collaborated with team in regards to diet/EN/PN recs, ONS/nourishment recs, pt's overall nutritional status.

## 2025-07-11 NOTE — DISCHARGE SUMMARY
[facility]  Discharge Note  Short Stay    Procedure(s) (LRB):  RELEASE, CARPAL TUNNEL (Left)      OUTCOME: Patient tolerated treatment/procedure well without complication and is now ready for discharge.    DISPOSITION: Home or Self Care    FINAL DIAGNOSIS:       * Carpal tunnel syndrome on left [G56.02]  Procedure(s) (LRB):  RELEASE, CARPAL TUNNEL (Left)  FOLLOWUP: In clinic 1    DISCHARGE INSTRUCTIONS:  No discharge procedures on file.     TIME SPENT ON DISCHARGE:  5 minutes

## 2025-07-13 NOTE — PATIENT INSTRUCTIONS
Medicare Annual Wellness Visit      Patient Name: Sophy Iyer  Today's Date: 7/13/2025    Below you will find your 5-10 year Screening Plan Recommendations:  Health Maintenance       Date Due Completion Date    Hepatitis C Screening Never done ---    HIV Screening Never done ---    TETANUS VACCINE Never done ---    Pneumococcal Vaccines (Age 50+) (1 of 2 - PCV) Never done ---    DEXA Scan Never done ---    Shingles Vaccine (2 of 2) 08/14/2018 6/19/2018    RSV Vaccine (Age 60+ and Pregnant patients) (1 - Risk 60-74 years 1-dose series) Never done ---    COVID-19 Vaccine (4 - 2024-25 season) 09/01/2024 1/10/2022    Influenza Vaccine (1) 09/01/2025 2/5/2025    Mammogram 06/09/2026 6/9/2025    Hemoglobin A1c (Diabetic Prevention Screening) 03/13/2028 3/13/2025    Lipid Panel 03/13/2030 3/13/2025    Colorectal Cancer Screening 05/07/2035 5/7/2025          Below is your summarized Personalized Prevention Plan that addresses any concerns we discussed today at your visit. Please see attached detailed information specific to your Health Concerns.  Orders Placed This Encounter   Procedures    Ambulatory referral/consult to Psychology    Ambulatory referral/consult to General Surgery         The following information is provided to all patients.  This information is to help you find additional resources for any problems that may be affecting your health: Living healthy guide: www.[facility].[place].gov      Understanding Diabetes: www.diabetes.org      Eating healthy: www.cdc.gov/healthyweight      CDC home safety checklist: www.cdc.gov/steadi/patient.html      Agency on Aging: www.goea.[place].gov      Alcoholics anonymous (AA): www.aa.org      Physical Activity: www.be.nih.gov/ob0degp      Tobacco use: www.quitwithusla.org                                 Patient Education       Weight Loss Tips   About this topic   More and more people are concerned about their weight. You can choose from many different programs. The goal  of a weight loss program may be to cut down on calories or to lose extra weight through exercise.  Losing weight may mean changing your ideas about food. Going on a diet and losing weight does not mean starving yourself. It means cutting down on the amount of food you eat, making healthy food choices, and being active.  General   Ideally, you need to lose 1 to 2 pounds (0.5 to 1 kg) a week for a healthy weight loss. Losing too much weight too fast is not good. When you take in fewer calories, you will lose weight. Your ideal calorie and weight goal depends on your current age, weight, height, and personal goals. Ask your doctor or dietitian what your ideal weight is.  You need to burn 3500 calories to lose 1 pound (0.5 kg). That means cutting out 500 calories every day for 7 days. You can cut out 500 calories per day by eating or drinking fewer calories, burning them through exercise, or doing both. To lose that extra weight and stay healthy:  Take time to exercise.  Exercise regularly. Burn calories with activity and exercise. Exercise can help you lose weight and it also strengthens your muscles. Set a schedule where you will have time to do exercises. With just 30 to 60 minutes of exercise each day, you could burn 500 extra calories. Your metabolism stays elevated for a period of time after exercise.  If you don't have time for a 30 minute workout, try three 10 minute exercises each day.  If you work near your home, walk to work. Walking is a very good form of exercise.  Take a 20 minute walk each day. Walk during your lunch break. Park far away, so you have to walk more.  Take the steps instead of elevators. You will burn more calories this way.  If you have an illness, like diabetes or high blood pressure, ask your doctor how much exercise is right for you.  Choose healthy snacks.  Low calorie healthy snacks are a good thing. They help your blood sugar stay even and prevent you from overeating at meals. Choose  "a balanced snack, such as a small apple with 2 tablespoons (30 grams) of peanut butter.  Keep in mind, even "low calorie" foods can add up. Just because you choose low calorie foods does not mean you do not have to count the calories you eat.  Pack a few fresh fruits or a small salad to take to work or school. Avoid buying a snack at the nearest vending machine.  When you feel thirsty, drink water. Water has no calories and is a very good thirst quencher.  Plan healthy meals.  Plan ahead. Keep a diary of foods that are low in calories. You can also make a list of meal plans for your breakfast, lunch, and dinner. Planning ahead will prevent you from eating out at a fast food place or restaurant.  Make a grocery list before shopping so you only buy food you need. Don't go to the store hungry.  Visit a dietitian. This person will help you make meal plans that will help you lose weight.  Add fiber to your meals. Adding fiber helps you to feel full for a longer amount of time.  Take care when eating out.  Choose lower fat and lower calorie meals. Try a seafood, lean meat, or vegetarian entrée.  Share a meal with a friend.  Try a salad and appetizer instead of an entree.  Ask for a to-go box when dinner is served and put half of your meal in it for a later meal.  Have fruit for dessert.  Drink water instead of other high calorie drinks.  Learn not to overeat.  Watch your portions. For example, the recommended serving size of meat is 3 ounces (90 grams). This is the size of a deck of playing cards. Two tablespoons (30 grams) of peanut butter is the size of a ping-pong ball. One medium fruit is the size of a baseball.  Use a smaller plate or glass during dinner for less calorie intake.  Try drinking a glass or two of water before eating. This may make you feel more full and help you to eat less.  Eat slowly. Take at least 30 minutes to eat. This gives time for your brain to tell your stomach you are full. This will help you " avoid overeating.  Some people eat smaller meals more often to help not to overeat. If you can eat six small meals, make them healthy and low calorie. If three meals are best for you, know your calorie level for the day and spread it out into three healthy low calorie meals.     What will the results be?   Losing weight may make you healthier. You also may have more energy for your daily activities. You may lower disease risk. You may also add years to your life.  What changes to diet are needed?   Learn how to read nutrition labels. Know the serving size. Knowing the calories in an item will help you make healthier choices and lose weight.  Keep a diary of the food you eat. This will help you count the calories you are taking in.  Make a menu in advance. This will help you make good choices to include in your diet.  Avoid eating 2 hours before bedtime to allow for digestion. If you eat right before you go to bed, you may also have worse heartburn.  Be sure to count the calories in the things you drink. You may want to stop drinking soda pop, beer, wine, and mixed drinks (alcohol). Some coffee drinks also have a lot of calories in them.  Who should use this diet?   A weight loss diet may be needed for people with a calculated body mass index of 25 and over. This means you are overweight or obese.  Who should not use this diet?   People with BMI of 18.5 or lower should not use this diet. Do not use this diet if your doctor does not recommend weight loss.  What foods are good to eat?   Choose foods that are nutritious. Remember, portion control is key. Even a low calorie food can become high in calories if you have too big of a serving. Here is a list of foods that are good to eat:  Vegetables:   Broccoli  Asparagus  Spinach  Green leafy vegetables  Tomatoes  Onions  Mushrooms  Cucumbers  Zucchini  Lean proteins:   Egg whites  Beans including kidney, navy, black, and chickpeas  Grilled, broiled, or baked skinless  chicken breast  Grilled, broiled, or baked skinless turkey breast  Lean beef  [place] meat  Grilled, broiled, or baked fish  Beans  Nuts, such as almonds, cashews, and pistachios  Seeds  Whole grains and carbs:   Oatmeal  Brown rice  Sweet potatoes  Cereal  Whole grain bread or pasta  Fruits:   Apples  Grapefruit  Blueberries  Oranges  Bananas  Grapes  Peaches  Pineapple  Strawberries  Dairy:   Fat-free or low-fat milk and cheese  Low fat yogurt  Soy, rice, or almond milk  What foods should be limited or avoided?   Limit or avoid foods that are high in calories like:  Junk foods  Fried foods  Fatty foods  Processed meats  Food with saturated and trans fat  Whole fat dairy products  Butter  Cheese  Ice cream  Food and drinks with a lot of sugar. Some examples are beer, wine, mixed drinks (alcohol), carbonated sodas, cakes, and cookies.  When do I need to call the doctor?   Weakness  Fast heartbeat  Dizziness  Helpful tips   Join a support group and an exercise group. It is much easier to lose weight if you have support and encouragement.  Do not skip meals. If you skip a meal, you most likely will overeat at that next meal.  Eat at the dining room table instead in front of the TV to help monitor intake.  Where can I learn more?   Academy of Nutrition and Dietetics  https://www.eatright.org/health/weight-loss/your-health-and-your-weight/back-to-basics-for-healthy-weight-loss   Centers for Disease Control and Prevention  https://www.cdc.gov/healthyweight/   Weight-Control Information Network  https://www.niddk.nih.gov/health-information/diet-nutrition/changing-habits-better-health   Last Reviewed Date   2021-08-09  Consumer Information Use and Disclaimer   This information is not specific medical advice and does not replace information you receive from your health care provider. This is only a brief summary of general information. It does NOT include all information about conditions, illnesses, injuries, tests,  procedures, treatments, therapies, discharge instructions or life-style choices that may apply to you. You must talk with your health care provider for complete information about your health and treatment options. This information should not be used to decide whether or not to accept your health care providers advice, instructions or recommendations. Only your health care provider has the knowledge and training to provide advice that is right for you.  Copyright   Copyright © 2021 UpToDate, Inc. and its affiliates and/or licensors. All rights reserved.    Patient Education       Health Risks of a High BMI   About this topic   Your weight and health depend on a few things. Doctors use a method called BMI or body mass index as a tool to learn more about your risk of having health problems. This tool uses your weight and your height to find your BMI.  BMI does not include things like your habits, where you live, family history, or amount of body fat. Some people with a normal BMI are still not healthy. Other people with a high BMI may be healthy. Most of the time, a person with a higher BMI is less healthy and will need more care. Ask your doctor for their view of your total health during a well visit or physical.  Being overweight or having a high BMI can hurt many parts of your body. Learn about your BMI and how your weight changes your health risks. Then you can make changes to keep yourself as healthy as you can.  General   Weighing too much can be very harmful to your body. It can cause many illnesses and can make it hard to move about.  Blood Sugar   You have a greater chance of having high blood sugar or diabetes if you weigh too much. Your body normally makes a hormone called insulin. The insulin allows your body to use the sugar in your blood.  The cells in your body may not be able to use insulin. Then your cells cannot get the sugar from your bloodstream that they need for energy. Your pancreas has to work  extra hard to try and make enough insulin to keep your blood sugar healthy.  If you lose weight and exercise, your body is able to control your blood sugar levels better. You may not need as much insulin to keep your blood sugar levels healthy.  Your Heart   Being overweight makes your heart work harder.  The blood vessels that bring blood to your heart muscle may become narrow or blocked and cause a heart attack or your heart may not pump as well as it should. Your heart rate may not be normal.  Losing weight can lower your chances of having problems with your heart.  High Blood Pressure   Your heart has to work harder to pump blood through a larger body and to make sure all of your cells have the oxygen they need.  As your heart has to work harder, your blood pressure goes up.  Being overweight can also harm your kidneys and this may also raise your blood pressure.  You may be able to lower your blood pressure through weight loss and routine exercise.  Stroke   Strokes are more likely to happen when someone has high blood pressure, heart problems, high blood sugar, or high cholesterol.  Being overweight puts you at a higher risk for all of these health problems. These problems put you at a higher risk for having a stroke.  Losing weight may help lower your blood pressure, which is the biggest risk factor for a stroke.  Cholesterol   Your cholesterol level is likely to be higher if you are overweight.  This can lead to narrowing of the blood vessels in your heart, neck, or other parts of your body. Then you may have chest pain or signs of low blood flow to a certain area. It can also lead to a heart attack or stroke.  Lowering your weight and changing what you eat may change your cholesterol levels.  Your Liver and Kidneys   You are more likely to have certain problems with your liver or kidneys if you have a high BMI.  Fatty liver disease is caused by a buildup of fat in your liver. Then your liver may not work as  well as it should.  You are at a higher risk for diabetes if you are overweight. This illness can cause kidney problems.  There is no exact way to treat fatty liver disease. By losing weight, you may keep your liver from getting any worse and help your liver work better.  By losing weight, you lower your chance of having kidney problems. If you already have kidney problems, weight loss may help keep your disease from getting worse.  Bone and Joint Problems   Weighing too much can put a lot of stress on your joints.  The extra weight may make the cartilage wear away more quickly from your bones. Your cartilage lines the surfaces of your bones to help your joints glide more easily.  When your cartilage is worn, your joints become stiff and sore.  Losing weight and exercising is one of the best ways to treat joint pain and stiffness. It can also ease the stress on your hips, knees, and back.  Cancer Risk   Gaining weight and poor health habits raise your risk for some kinds of cancer.  Healthy eating and exercise may lower your risk for some kinds of cancer.  Sleep   With a high BMI, you may have extra fat around your neck. This can make your airway smaller and put you at risk for a problem called sleep apnea. With this problem, your breathing starts and stops when you are sleeping.  Signs of sleep apnea include snoring, feeling sleepy during the day, and problems with focusing.  Sleep apnea can lead to heart problems such as increased risk for heart disease and arrhythmias like atrial fibrillation.  Losing weight can lower the amount of fat around your neck and ease sleep apnea problems.  Pregnancy   Your weight can affect how often you have a period. It may also make it harder for you to get pregnant. A high BMI can cause problems for both mom and baby.  If you are overweight and pregnant you are at a higher risk for high blood sugar or high blood pressure while you are pregnant.  You are also more likely to have  your baby early or to need a C section.  Losing weight before you become pregnant may lower your chance for these problems. If you are pregnant, talk to your doctor before you try to lose weight.  Depression   You are more likely to suffer from depression or low mood when you have a high BMI.  You may have a low mood because of low self-esteem, lack of activity, lack of sleep, and health problems caused by being overweight.  Losing weight and finding out the reasons you overeat are some of the steps to improve your quality of life and deal with depression.  Where can I learn more?   [place] of Diabetes and Digestive and Kidney Diseases  https://www.niddk.nih.gov/health-information/weight-management/adult-overweight-obesity/health-risks   Last Reviewed Date   2021-09-15  Consumer Information Use and Disclaimer   This information is not specific medical advice and does not replace information you receive from your health care provider. This is only a brief summary of general information. It does NOT include all information about conditions, illnesses, injuries, tests, procedures, treatments, therapies, discharge instructions or life-style choices that may apply to you. You must talk with your health care provider for complete information about your health and treatment options. This information should not be used to decide whether or not to accept your health care providers advice, instructions or recommendations. Only your health care provider has the knowledge and training to provide advice that is right for you.  Copyright   Copyright © 2021 UpToDate, Inc. and its affiliates and/or licensors. All rights reserved.    Patient Education       Stress   About this topic   Everyone feels stress. What is stressful for you may not be stressful to some other person. Any kind of mental, emotional, financial, or physical pressure can cause stress. Stress is a part of life. It is not an illness. Stress can be short  term or long term.  Short term stress lasts for a short time and then quickly goes away. This stress can give you raised energy and help you in times when your body feels in danger.  Long term stress may go on for weeks or months. You may not even notice you are stressed because it becomes a part of your life.  Your body releases hormones to help it cope with the stress. These cause your heart to beat faster and you to breathe faster. Your muscles tense and your body is ready to face danger. These changes are good in times of short term stress. If you don't take care of long term stress, you may have:  Mental health problems like low mood, worry, anger, or low self esteem  Physical health problems like high blood pressure, heart attacks, strokes, headaches, trouble with digestion or blood sugar  Other problems like poor sleep or pain  Long term stress can also lower your body's ability to fight off other health problems and lead to infections.  What are the causes?   Common causes of short term stress:  You have too much to do  Work deadlines  You feel like you do not have control  Arguments  Small problems, especially if many of them happen at one time  Activities that are exciting or daring, like riding a roller coaster  Fear  Common causes of long term stress:  Death of a loved one  Problems with money  Illness  Emotional problems or abuse  Problems at work, [facility], or job loss  Traumatic events  Relationships, pregnancy, children, family problems, marriage, or divorce  Moving  What can make this more likely to happen?   The stress in our lives can come from our responsibilities and the activities we do every day. Your signs from stress may be worse when you have more stress or problems.  What are the main signs?   People respond to stress in many ways. Stress may cause your heart rate and breathing to speed up. You may feel worried or overwhelmed. You may have problems with sleep. Stress may cause you to  have a low mood or feel angry. You may have an upset stomach, eat too much, or not eat at all. Some people are forgetful and have trouble with focus and are not able to get things done. Others have back pain or other pain.  How does the doctor diagnose this health problem?   The doctor will ask you questions about your history and do an exam. Your doctor may ask you about specific causes of stress in your life. The doctor will look for signs of long term stress like:  Problems with your mood and focusing like trouble thinking, low mood, or forgetfulness  Headaches  Trouble sleeping or feeling tired all the time  Skin rashes  Times of anger, rage  Heart problems, high blood pressure, or chest pain  Weight gain or loss  Stomach problems, more loose stools, or very hard stools  Back, neck, or other kinds of pain  More colds or infections  Reproductive problems  Suicidal thoughts  How does the doctor treat this health problem?   Your doctor may treat the signs of long term stress. The doctor may also suggest ways to help you lower your stress.  What lifestyle changes are needed?   There are many ways to lower stress. One of them is through relaxation techniques. These may help you become calmer. They can also help you deal with strong emotions or tough situations. Here are some ways to help relax your mind and body.  Take deep breaths - Breathe in slowly through your nose. Hold your breath for about 3 seconds. Then, breathe slowly out your mouth. It may help to close your eyes and listen to your breathing. Think about your belly going up and down with each breath.  Guided visualization - Close your eyes and picture a safe, peaceful scene. Choose a place special to you like the ocean, mountain, woods, or somewhere in nature. Think about the details and what your senses notice in your scene. What are the smells, colors, temperature, sensations, and sounds found there?  Progressive relaxation - Sit or lie quietly. Start by  making a group of muscles tense or tight and then relax them. Then, move to another muscle group. You may want to start at your feet and work towards your head.  Laughter - Laughing helps lower stress. Try watching a comedy on television. Tell funny stories with friends. Share jokes or funny books. Go to a comedy club or seek out funny people to be with.  Music - Listen to soft music to help calm your mind. You could also sing or play an instrument.  Massage - Rub or knead muscles to lower tension. This can lead to a more relaxed body and mind.  Meditation - You can meditate while doing other activities. The goal is to clear your mind. Many people feel calm when they walk or run. Others feel calm when they knit or do needlework. Some people just sit quietly.  Take a 10-second break - You may feel very stressed but cannot leave where you are. If so, close your eyes and breathe deeply for 10 seconds.  Yoga or jacqueline chi - These activities use a group of slow body movements together with deep breathing. They lower stress and anxiety. They may also help your flexibility, balance, and strength.  Mindfulness - Keeping your thoughts focused on the present and not future worries.  Distraction - To get your mind off of stress, try to read a book, go to a movie, work on a hobby, or spend time with a friend.  Exercise - Go for a walk, ride a bike, go for a swim, go to the gym. Do a physical activity that you enjoy.  Conflict resolution - Work to resolve conflicts with others.  What can be done to prevent this health problem?   It is OK to say no when asked to do something. Do not try to do everything.  Learn what causes stress for you. This will help you be ready when it happens. Learn how much caffeine you drink each day. Too much caffeine can cause you to feel nervous. It may also keep you awake at night.  Get 6 to 8 hours of sleep a night. Sleep is important for good mental health.  Exercise often and drink 6 to 8 glasses of  water each day.  When you feel overwhelmed or stressed, talk to family or friends. This may help others find out what you need and give you support.  Think about working with a mental health professional if anxiety or stress continues to cause problems in your daily life.  Where can I learn more?   Heart.org  https://www.heart.org/en/healthy-living/healthy-lifestyle/stress-management/stress-and-heart-health   NHS  https://www.nhs.uk/conditions/stress-anxiety-depression/understanding-stress/   Office on Women's Health  http://womenshealth.gov/publications/our-publications/fact-sheet/stress-your-health.html#a   Last Reviewed Date   2019-10-16  Consumer Information Use and Disclaimer   This information is not specific medical advice and does not replace information you receive from your health care provider. This is only a brief summary of general information. It does NOT include all information about conditions, illnesses, injuries, tests, procedures, treatments, therapies, discharge instructions or life-style choices that may apply to you. You must talk with your health care provider for complete information about your health and treatment options. This information should not be used to decide whether or not to accept your health care providers advice, instructions or recommendations. Only your health care provider has the knowledge and training to provide advice that is right for you.  Copyright   Copyright © 2021 [employer] and its affiliates and/or licensors. All rights reserved.    Patient Education       Exercise and Aging   General   Exercise can help older adults prevent falls and stay independent longer. Exercise may also help:  You have stronger muscles and bones and better balance  You lose weight and have more energy  Make your heart and lungs stronger  Lower your chance of health problems like heart disease, high blood sugar, or breast or colon cancer  Control conditions like high blood pressure and  diabetes  You manage stress and get better sleep  Your mood, self-esteem, and self-image  How you think, plan, and pay attention  There are four types of exercise: endurance, strength, balance, and flexibility.  Endurance exercises get your heart rate up and keep it up for a while. Most people should get at least 30 minutes of exercise that gets your heart rate up on 5 or more days each week. Walking, swimming, biking, or going up and down stairs are kinds of exercises to get your heart rate up. You do not have to do all 30 minutes at one time. Try doing 10 minutes 3 times a day.  Strength exercises build muscle. Lifting weights or doing knee bends are kinds of strength exercises.  Balance exercises help to prevent falls. Raise up on your toes or stand on one leg as a kind of balance exercise.  Flexibility exercises move your joints through a full range of motion and stretch your muscles. Bend forward in a chair to stretch your back, do stretches, or bend and extend your arms or legs as a kind of flexibility exercise. Try doing 10 minutes twice a week.  Plan to include all these exercise types in your exercise program. Always check with your doctor before you start a new exercise program.  Some people do not like formal exercise classes, but there are many ways to work your muscles each day. Here are some things you can do.  Use steps instead of elevators.  Park far away in parking lots to walk farther.  Use the time while you wait. Do knee bends as you hold onto the kitchen counter while you wait for your coffee to brew.  When you unload groceries, lift the bags or a container of milk a few times to exercise your arms and legs.  Walk the long way when you go somewhere.  After you walk to the bathroom, walk a few laps around the house before sitting down.  Try doing different standing exercises after you wash your hands each time in the bathroom.  Do balance exercises while you brush your teeth.  Do an exercise or  get up and walk during TV commercials.  Don't forget to exercise small muscle groups like your hands, fingers, ankles, toes, and neck. Wiggle, bend, flex, and rotate these joints from left to right and back to front to help to keep these joints flexible.  When do I need to call the doctor?   Stop exercising and talk to your doctor if you have any of these problems:  Dizziness  Shortness of breath  Pain or pressure in the chest, arms, throat, jaw, or back  Nausea or vomiting  Blood clots  Infection  Joint swelling  Open wounds  Recent hip, back, or eye surgery  New problems that start during exercise  Helpful tips   If you have a health problem like heart disease or diabetes, talk with your doctor about the best exercises for you.  Always warm up before you stretch. Heated muscles stretch much easier than cool muscles. Try to walk or bike at an easy pace for a few minutes to warm up your muscles.  Slow your pace again after you exercise to cool down and bring your heart rate down slowly.  Be sure you do not hold your breath when you exercise because it can raise your blood pressure. If you tend to hold your breath, try to count out loud when you exercise.  Never bounce when doing stretches. Use slow and steady motions and hold your stretch for 20 to 30 seconds.  Have a routine. Doing exercises before a meal may be a good way to get into a routine.  Set small goals for yourself when you start to exercise. Use a chart to see how much you are doing. Ask someone to exercise with you.  Exercise may be slightly uncomfortable, but you should not have sharp pains. If you do get sharp pains, stop what you are doing. If the sharp pains continue, call your doctor.  Drink plenty of fluids without caffeine when you exercise and afterwards. Avoid outdoor exercise if it is too cold or too hot.  Wear the right clothes and shoes. Try layers of clothes, so that you can take them off if you get too hot. Shoes should fit well and  support your feet.  Where can I learn more?   [employer] [place] [employer] Aging  https://www.be.nih.gov/health/publication/exercise-physical-activity/introduction   Last Reviewed Date   2021-03-18  Consumer Information Use and Disclaimer   This information is not specific medical advice and does not replace information you receive from your health care provider. This is only a brief summary of general information. It does NOT include all information about conditions, illnesses, injuries, tests, procedures, treatments, therapies, discharge instructions or life-style choices that may apply to you. You must talk with your health care provider for complete information about your health and treatment options. This information should not be used to decide whether or not to accept your health care providers advice, instructions or recommendations. Only your health care provider has the knowledge and training to provide advice that is right for you.  Copyright   Copyright © 2021 [employer] and its affiliates and/or licensors. All rights reserved.    Patient Education       Preventing Falls in the Older Adult   About this topic   A fall is the sudden loss of balance that causes a person to drop to the ground or floor. Falls are a serious health risk and they happen more often as we get older. Many things may increase your risk of falling, like:  Problems that come with getting older  Muscle weakness  Balance problems  More trouble seeing  Personal health factors  Health conditions such as arthritis, Parkinson's disease, low blood pressure, or stroke  Medicines you take  Loss of feeling in your feet  Being less active  Taking drugs that makes you dizzy or drowsy  Habits like alcohol use  Things around your house  Slippery floors  Unsecured rugs  Stairs  Wearing improper fitting shoes  Areas where it is dark and difficult to see  Incorrect size or type of assistive devices  Clutter and items on the floor that block your  walkway     What will the results be?   Prevent future falls  Avoid injuries and disabilities  Improve overall health  Will there be any other care needed?   Ask your doctor if you need to take vitamin D to help keep your bones strong.  Make your home safer. Get rid of things that might make you trip or slip. These are things like loose rugs, electrical cords, or clutter. Add grab bars, a shower seat, and handrails.  Wear sturdy shoes that fit well. Shoes should fit well, have a low heel, and the soles of the shoe should not be slippery. Walking in socks or with bare feet can raise your chance for falling.  Stay active. Walk, garden, swim, or do something active on a regular basis. These activities may prevent you from getting hurt if you do fall. They also help with your strength and balance.  Use a cane, walker, or other safety device. Be sure it is the right size for you and that you know how to use it safely. Be sure to wear your eyeglasses if they have been ordered for you.  Get up slowly after you sit or lie down. Try to change positions slowly.  What to do if you fall:  Stay calm and do not panic.  Look for signs to decide if you have been hurt or have an injury.  If you think you can get up safely, try to get up.  If you are hurt or cannot get up on your own, try to get help.  If no one is available to help, try to get comfortable and wait for someone to arrive who can help you.  Stay warm and move regularly as you are able. Avoid putting too much pressure on any one area.  After a fall, tell family and friends that you have fallen. It is also important to talk to your doctor about your fall right away.  What problems could happen?   A fall can lead to broken bones and other serious injuries in older adults. Problems that happen because of a fall may even lead to death in older adults. Many people are not able to return to their former level of activity after a fall.  What can be done to prevent this health  problem?   Lower Your Risk of Falling  Wear your eyeglasses. Have regular eye checkups. Do not use reading glasses when you walk around.  Quit smoking and limit alcohol intake. Smoking and too much alcohol can decrease bone mass and increase the chance of broken bones.  Know the side effects of the drugs you are taking. Some drugs may affect your balance and cause confusion or sleepiness.  Get up slowly after you sit or lie down. Do not change positions quickly. Do not rush when you need to go to the bathroom or to answer the phone.  Stay Physically Active  Be physically active. This will help to improve your strength and balance.  Fear of falling may lead you to avoid activities. Talk to your doctor. You may be sent to a physical therapist. This person can help you improve balance and build your confidence. Getting rid of your fear of falling can help you stay active and prevent future falls.  Join an exercise program. Ask your doctor what exercise is safe for you. Be sure to ask before you do any exercises, especially if you have illnesses like arthritis. Exercise can help you keep muscles strong and help with your balance. It is also a good way to learn proper ways to do each activity or exercise.  Safety Tips at Home  Keep your floors and walking areas clear from clutter. Remove furniture that blocks your way. Secure cords and wires near the wall to avoid tripping over them. Get rid of throw rugs.  Be sure the lights in your house are working well and provide good lighting throughout your home. Make sure you can reach switches and lamps easily. Place a lamp close to your bed that is easy to reach.  Fix all steps and sidewalks to make them smooth and even. Put handrails and lights on stairs.  Keep all the things you use often on low shelves or in cabinets that are at about waist level. Ask for help to move items off of high shelves. Do not use a chair as a step stool.  Keep your bathroom area safe. Use nonslip  rubber mats on the floor and in the tub or shower.  Keep a phone near you in case of emergency. Keep a list of your emergency contact numbers in large print near your phone. Carry a phone with you when you go for a walk. Consider using a personal alarm device that could call for help in case you fall and cannot get up.  Think about protecting your hip. Hip protectors may be needed if you have a higher chance for falling. Ask your doctor about this.  Where can I learn more?   [employer]  https://familydoctor.org/libna-wyj-gj-lower-your-risk/   [facility]  https://www.nhs.uk/conditions/falls/prevention/   Last Reviewed Date   2021-06-07  Consumer Information Use and Disclaimer   This information is not specific medical advice and does not replace information you receive from your health care provider. This is only a brief summary of general information. It does NOT include all information about conditions, illnesses, injuries, tests, procedures, treatments, therapies, discharge instructions or life-style choices that may apply to you. You must talk with your health care provider for complete information about your health and treatment options. This information should not be used to decide whether or not to accept your health care providers advice, instructions or recommendations. Only your health care provider has the knowledge and training to provide advice that is right for you.  Copyright   Copyright © 2021 [employer] and its affiliates and/or licensors. All rights reserved.

## 2025-07-14 VITALS
TEMPERATURE: 98 F | HEART RATE: 50 BPM | DIASTOLIC BLOOD PRESSURE: 68 MMHG | WEIGHT: 179.88 LBS | HEIGHT: 59 IN | SYSTOLIC BLOOD PRESSURE: 118 MMHG | RESPIRATION RATE: 20 BRPM | OXYGEN SATURATION: 93 % | BODY MASS INDEX: 36.26 KG/M2

## 2025-07-29 ENCOUNTER — PATIENT MESSAGE (OUTPATIENT)
Dept: BEHAVIORAL HEALTH | Facility: CLINIC | Age: 65
End: 2025-07-29
Payer: MEDICARE

## 2025-08-04 ENCOUNTER — TELEPHONE (OUTPATIENT)
Dept: FAMILY MEDICINE | Facility: CLINIC | Age: 65
End: 2025-08-04
Payer: MEDICARE

## 2025-08-04 DIAGNOSIS — G43.909 MIGRAINE WITHOUT STATUS MIGRAINOSUS, NOT INTRACTABLE, UNSPECIFIED MIGRAINE TYPE: Primary | ICD-10-CM

## 2025-08-04 RX ORDER — RIMEGEPANT SULFATE 75 MG/75MG
75 TABLET, ORALLY DISINTEGRATING ORAL ONCE AS NEEDED
Qty: 14 TABLET | Refills: 2 | Status: SHIPPED | OUTPATIENT
Start: 2025-08-04 | End: 2025-08-04

## 2025-08-05 ENCOUNTER — TELEPHONE (OUTPATIENT)
Dept: FAMILY MEDICINE | Facility: CLINIC | Age: 65
End: 2025-08-05
Payer: MEDICARE

## 2025-08-05 DIAGNOSIS — R03.0 ELEVATED BLOOD PRESSURE READING: ICD-10-CM

## 2025-08-05 DIAGNOSIS — G43.909 MIGRAINE WITHOUT STATUS MIGRAINOSUS, NOT INTRACTABLE, UNSPECIFIED MIGRAINE TYPE: Primary | ICD-10-CM

## 2025-08-06 ENCOUNTER — TELEPHONE (OUTPATIENT)
Dept: FAMILY MEDICINE | Facility: CLINIC | Age: 65
End: 2025-08-06
Payer: MEDICARE

## 2025-08-06 NOTE — TELEPHONE ENCOUNTER
Spoke with pharmacy the [facility]arte went through.  Spoke with patient and explained how to take this one since the directions are different than [facility].  Patient voiced understanding.

## 2025-08-11 ENCOUNTER — PATIENT MESSAGE (OUTPATIENT)
Dept: BEHAVIORAL HEALTH | Facility: CLINIC | Age: 65
End: 2025-08-11
Payer: MEDICARE

## 2025-08-18 ENCOUNTER — OFFICE VISIT (OUTPATIENT)
Dept: BEHAVIORAL HEALTH | Facility: CLINIC | Age: 65
End: 2025-08-18
Payer: MEDICARE

## 2025-08-18 VITALS
WEIGHT: 190.25 LBS | DIASTOLIC BLOOD PRESSURE: 76 MMHG | OXYGEN SATURATION: 98 % | SYSTOLIC BLOOD PRESSURE: 138 MMHG | BODY MASS INDEX: 38.36 KG/M2 | HEIGHT: 59 IN | HEART RATE: 77 BPM | TEMPERATURE: 99 F

## 2025-08-18 DIAGNOSIS — G47.00 INSOMNIA, UNSPECIFIED TYPE: ICD-10-CM

## 2025-08-18 DIAGNOSIS — F31.9 BIPOLAR AFFECTIVE DISORDER, REMISSION STATUS UNSPECIFIED: Primary | ICD-10-CM

## 2025-08-18 DIAGNOSIS — F41.1 GENERALIZED ANXIETY DISORDER: ICD-10-CM

## 2025-08-18 PROCEDURE — 99214 OFFICE O/P EST MOD 30 MIN: CPT | Mod: ,,, | Performed by: NURSE PRACTITIONER

## 2025-08-18 PROCEDURE — 3078F DIAST BP <80 MM HG: CPT | Mod: ,,, | Performed by: NURSE PRACTITIONER

## 2025-08-18 PROCEDURE — 1159F MED LIST DOCD IN RCRD: CPT | Mod: ,,, | Performed by: NURSE PRACTITIONER

## 2025-08-18 PROCEDURE — 3008F BODY MASS INDEX DOCD: CPT | Mod: ,,, | Performed by: NURSE PRACTITIONER

## 2025-08-18 PROCEDURE — 3075F SYST BP GE 130 - 139MM HG: CPT | Mod: ,,, | Performed by: NURSE PRACTITIONER

## 2025-08-18 PROCEDURE — 1160F RVW MEDS BY RX/DR IN RCRD: CPT | Mod: ,,, | Performed by: NURSE PRACTITIONER

## 2025-08-18 PROCEDURE — 1157F ADVNC CARE PLAN IN RCRD: CPT | Mod: ,,, | Performed by: NURSE PRACTITIONER

## 2025-08-18 PROCEDURE — 3044F HG A1C LEVEL LT 7.0%: CPT | Mod: ,,, | Performed by: NURSE PRACTITIONER

## 2025-08-18 PROCEDURE — 1125F AMNT PAIN NOTED PAIN PRSNT: CPT | Mod: ,,, | Performed by: NURSE PRACTITIONER

## 2025-08-18 RX ORDER — TRAZODONE HYDROCHLORIDE 150 MG/1
TABLET ORAL
Qty: 90 TABLET | Refills: 2 | Status: SHIPPED | OUTPATIENT
Start: 2025-08-18

## 2025-08-18 RX ORDER — CARBAMAZEPINE 200 MG/1
200 TABLET ORAL 2 TIMES DAILY
Qty: 60 TABLET | Refills: 2 | Status: SHIPPED | OUTPATIENT
Start: 2025-08-18

## 2025-08-18 RX ORDER — PAROXETINE 30 MG/1
60 TABLET, FILM COATED ORAL DAILY
Qty: 60 TABLET | Refills: 2 | Status: SHIPPED | OUTPATIENT
Start: 2025-08-18

## 2025-08-22 PROBLEM — J06.9 VIRAL UPPER RESPIRATORY TRACT INFECTION: Status: ACTIVE | Noted: 2025-08-22

## 2025-08-25 ENCOUNTER — PATIENT MESSAGE (OUTPATIENT)
Dept: FAMILY MEDICINE | Facility: CLINIC | Age: 65
End: 2025-08-25
Payer: MEDICARE

## 2025-08-26 RX ORDER — CEFDINIR 300 MG/1
300 CAPSULE ORAL 2 TIMES DAILY
Qty: 20 CAPSULE | Refills: 0 | Status: SHIPPED | OUTPATIENT
Start: 2025-08-26 | End: 2025-09-05

## (undated) DEVICE — SOCKINETTE IMPERVIOUS 12X48IN

## (undated) DEVICE — IMMOBILIZER HAND ALUMINUM LRG

## (undated) DEVICE — GLOVE PROTEXIS LTX 6.5

## (undated) DEVICE — CUFF CYL W/ SLEEVE SP SB 18IN

## (undated) DEVICE — CHLORAPREP W TINT 26ML APPL

## (undated) DEVICE — BANDAGE COMPR DBL HOOK 3INX5YD

## (undated) DEVICE — Device

## (undated) DEVICE — BANDAGE ESMARK ELASTIC ST 4X9

## (undated) DEVICE — BANDAGE COMPR DBL HOOK 4INX5YD

## (undated) DEVICE — BANDAGE ROLL COTTN 4.5INX4.1YD

## (undated) DEVICE — DRAPE EXTREMITY 90X124IN STRL

## (undated) DEVICE — TOURNIQUET SB QC DP 18X4IN

## (undated) DEVICE — ELECTRODE REM POLYHESIVE II

## (undated) DEVICE — DRESSING XEROFORM GAUZE 5X9

## (undated) DEVICE — SUT 4.0 ETHILON